# Patient Record
Sex: FEMALE | Race: WHITE | NOT HISPANIC OR LATINO | Employment: OTHER | URBAN - METROPOLITAN AREA
[De-identification: names, ages, dates, MRNs, and addresses within clinical notes are randomized per-mention and may not be internally consistent; named-entity substitution may affect disease eponyms.]

---

## 2017-04-24 ENCOUNTER — APPOINTMENT (EMERGENCY)
Dept: RADIOLOGY | Facility: HOSPITAL | Age: 82
End: 2017-04-24
Payer: MEDICARE

## 2017-04-24 ENCOUNTER — HOSPITAL ENCOUNTER (EMERGENCY)
Facility: HOSPITAL | Age: 82
Discharge: HOME | End: 2017-04-24
Attending: EMERGENCY MEDICINE | Admitting: EMERGENCY MEDICINE
Payer: MEDICARE

## 2017-04-24 VITALS
OXYGEN SATURATION: 92 % | RESPIRATION RATE: 18 BRPM | DIASTOLIC BLOOD PRESSURE: 70 MMHG | TEMPERATURE: 98.5 F | WEIGHT: 160 LBS | BODY MASS INDEX: 28.34 KG/M2 | HEART RATE: 72 BPM | SYSTOLIC BLOOD PRESSURE: 163 MMHG

## 2017-04-24 DIAGNOSIS — S00.93XA HEAD CONTUSION: ICD-10-CM
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
## 2017-04-24 DIAGNOSIS — S41.111A SKIN TEAR OF RIGHT UPPER ARM WITHOUT COMPLICATION, INITIAL ENCOUNTER: ICD-10-CM

## 2017-04-24 DIAGNOSIS — M25.551 ACUTE RIGHT HIP PAIN: ICD-10-CM

## 2017-04-24 PROCEDURE — 73564 X-RAY EXAM KNEE 4 OR MORE: CPT

## 2017-04-24 PROCEDURE — 73502 X-RAY EXAM HIP UNI 2-3 VIEWS: CPT

## 2017-04-24 PROCEDURE — 70450 CT HEAD/BRAIN W/O DYE: CPT

## 2017-04-24 PROCEDURE — 99284 EMERGENCY DEPT VISIT MOD MDM: CPT

## 2017-04-24 RX ORDER — ACETAMINOPHEN 325 MG/1
650 TABLET ORAL EVERY 6 HOURS PRN
COMMUNITY
End: 2018-06-11

## 2017-04-24 RX ORDER — MELATONIN
1000 2 TIMES DAILY
COMMUNITY

## 2017-04-26 ENCOUNTER — ALLSCRIPTS OFFICE VISIT (OUTPATIENT)
Dept: OTHER | Facility: OTHER | Age: 82
End: 2017-04-26

## 2017-05-10 ENCOUNTER — ALLSCRIPTS OFFICE VISIT (OUTPATIENT)
Dept: OTHER | Facility: OTHER | Age: 82
End: 2017-05-10

## 2017-05-21 ENCOUNTER — HOSPITAL ENCOUNTER (EMERGENCY)
Facility: HOSPITAL | Age: 82
Discharge: HOME | End: 2017-05-21
Attending: EMERGENCY MEDICINE
Payer: MEDICARE

## 2017-05-21 ENCOUNTER — APPOINTMENT (EMERGENCY)
Dept: RADIOLOGY | Facility: HOSPITAL | Age: 82
End: 2017-05-21
Payer: MEDICARE

## 2017-05-21 VITALS
HEIGHT: 64 IN | SYSTOLIC BLOOD PRESSURE: 180 MMHG | OXYGEN SATURATION: 95 % | DIASTOLIC BLOOD PRESSURE: 94 MMHG | TEMPERATURE: 98.1 F | RESPIRATION RATE: 18 BRPM | HEART RATE: 77 BPM | BODY MASS INDEX: 26.57 KG/M2 | WEIGHT: 155.65 LBS

## 2017-05-21 DIAGNOSIS — S61.411A SKIN TEAR OF RIGHT HAND WITHOUT COMPLICATION, INITIAL ENCOUNTER: Primary | ICD-10-CM

## 2017-05-21 DIAGNOSIS — S60.411A ABRASION OF LEFT INDEX FINGER, INITIAL ENCOUNTER: ICD-10-CM
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
## 2017-05-21 DIAGNOSIS — S60.221A CONTUSION OF HAND, RIGHT: ICD-10-CM

## 2017-05-21 DIAGNOSIS — S60.414A ABRASION OF RIGHT RING FINGER, INITIAL ENCOUNTER: ICD-10-CM

## 2017-05-21 PROCEDURE — 73130 X-RAY EXAM OF HAND: CPT

## 2017-05-21 PROCEDURE — 99284 EMERGENCY DEPT VISIT MOD MDM: CPT

## 2017-05-21 PROCEDURE — 73140 X-RAY EXAM OF FINGER(S): CPT

## 2017-05-21 RX ORDER — CEPHALEXIN 500 MG/1
500 CAPSULE ORAL 2 TIMES DAILY
Qty: 10 CAPSULE | Refills: 0 | Status: SHIPPED | OUTPATIENT
Start: 2017-05-21 | End: 2017-05-26

## 2017-05-21 RX ORDER — GINSENG 100 MG
1 CAPSULE ORAL ONCE
Status: COMPLETED | OUTPATIENT
Start: 2017-05-21 | End: 2017-05-21

## 2017-05-21 RX ORDER — CEPHALEXIN 250 MG/1
500 CAPSULE ORAL ONCE
Status: COMPLETED | OUTPATIENT
Start: 2017-05-21 | End: 2017-05-21

## 2017-05-21 RX ORDER — ACETAMINOPHEN 325 MG/1
650 TABLET ORAL ONCE
Status: COMPLETED | OUTPATIENT
Start: 2017-05-21 | End: 2017-05-21

## 2017-05-21 RX ADMIN — CEPHALEXIN 500 MG: 250 CAPSULE ORAL at 04:43

## 2017-05-21 RX ADMIN — BACITRACIN ZINC 1 SMALL APPLICATION: 500 OINTMENT TOPICAL at 04:44

## 2017-05-21 RX ADMIN — ACETAMINOPHEN 650 MG: 325 TABLET, FILM COATED ORAL at 04:43

## 2017-05-22 ENCOUNTER — ALLSCRIPTS OFFICE VISIT (OUTPATIENT)
Dept: OTHER | Facility: OTHER | Age: 82
End: 2017-05-22

## 2017-05-23 ENCOUNTER — ALLSCRIPTS OFFICE VISIT (OUTPATIENT)
Dept: WOUND CARE | Facility: HOSPITAL | Age: 82
End: 2017-05-23
Attending: PREVENTIVE MEDICINE
Payer: MEDICARE

## 2017-05-23 PROCEDURE — 97598 DBRDMT OPN WND ADDL 20CM/<: CPT | Performed by: PREVENTIVE MEDICINE

## 2017-05-23 PROCEDURE — 99213 OFFICE O/P EST LOW 20 MIN: CPT | Performed by: PREVENTIVE MEDICINE

## 2017-05-23 PROCEDURE — 97597 DBRDMT OPN WND 1ST 20 CM/<: CPT | Performed by: PREVENTIVE MEDICINE

## 2017-05-30 ENCOUNTER — ALLSCRIPTS OFFICE VISIT (OUTPATIENT)
Dept: WOUND CARE | Facility: HOSPITAL | Age: 82
End: 2017-05-30
Attending: PREVENTIVE MEDICINE
Payer: MEDICARE

## 2017-05-30 PROCEDURE — 97598 DBRDMT OPN WND ADDL 20CM/<: CPT | Performed by: PREVENTIVE MEDICINE

## 2017-05-30 PROCEDURE — 97597 DBRDMT OPN WND 1ST 20 CM/<: CPT | Performed by: PREVENTIVE MEDICINE

## 2017-06-01 ENCOUNTER — GENERIC CONVERSION - ENCOUNTER (OUTPATIENT)
Dept: OTHER | Facility: OTHER | Age: 82
End: 2017-06-01

## 2017-06-13 ENCOUNTER — APPOINTMENT (OUTPATIENT)
Dept: WOUND CARE | Facility: HOSPITAL | Age: 82
End: 2017-06-13
Attending: PODIATRIST
Payer: MEDICARE

## 2017-06-13 PROCEDURE — 97597 DBRDMT OPN WND 1ST 20 CM/<: CPT | Performed by: PREVENTIVE MEDICINE

## 2017-06-27 ENCOUNTER — APPOINTMENT (OUTPATIENT)
Dept: WOUND CARE | Facility: HOSPITAL | Age: 82
End: 2017-06-27
Payer: MEDICARE

## 2017-06-27 PROCEDURE — 17250 CHEM CAUT OF GRANLTJ TISSUE: CPT | Performed by: PREVENTIVE MEDICINE

## 2017-07-13 ENCOUNTER — APPOINTMENT (OUTPATIENT)
Dept: WOUND CARE | Facility: HOSPITAL | Age: 82
End: 2017-07-13
Payer: MEDICARE

## 2017-07-13 PROCEDURE — 99213 OFFICE O/P EST LOW 20 MIN: CPT | Performed by: PREVENTIVE MEDICINE

## 2017-09-18 ENCOUNTER — GENERIC CONVERSION - ENCOUNTER (OUTPATIENT)
Dept: INTERNAL MEDICINE CLINIC | Facility: CLINIC | Age: 82
End: 2017-09-18

## 2017-09-18 ENCOUNTER — GENERIC CONVERSION - ENCOUNTER (OUTPATIENT)
Dept: OTHER | Facility: OTHER | Age: 82
End: 2017-09-18

## 2017-12-13 ENCOUNTER — HOSPITAL ENCOUNTER (EMERGENCY)
Facility: HOSPITAL | Age: 82
Discharge: HOME | End: 2017-12-13
Attending: EMERGENCY MEDICINE
Payer: MEDICARE

## 2017-12-13 ENCOUNTER — APPOINTMENT (EMERGENCY)
Dept: RADIOLOGY | Facility: HOSPITAL | Age: 82
End: 2017-12-13
Payer: MEDICARE

## 2017-12-13 VITALS
OXYGEN SATURATION: 94 % | DIASTOLIC BLOOD PRESSURE: 77 MMHG | RESPIRATION RATE: 18 BRPM | SYSTOLIC BLOOD PRESSURE: 148 MMHG | HEART RATE: 80 BPM | TEMPERATURE: 97.7 F

## 2017-12-13 DIAGNOSIS — S52.613A FRACTURE OF ULNAR STYLOID: ICD-10-CM

## 2017-12-13 DIAGNOSIS — S52.509A DISTAL RADIUS FRACTURE: Primary | ICD-10-CM

## 2017-12-13 PROCEDURE — 73110 X-RAY EXAM OF WRIST: CPT

## 2017-12-13 PROCEDURE — 99283 EMERGENCY DEPT VISIT LOW MDM: CPT

## 2017-12-13 NOTE — ED PROVIDER NOTES
History  Chief Complaint   Patient presents with    Fall     Pt reports falling at 200 this morning, reporting left wrist pain and swelling  (-) THINNERS, (-) head strike     81 y/o female presents today after falling around 0030 this am   Lives with daughter but didn't tell her she fell  Was complaining of left wrist pain this morning  No other injuries  No thinners  Didn't hit head  NO LOC  History provided by:  Patient and relative  Fall   Mechanism of injury: fall    Injury location:  Shoulder/arm  Shoulder/arm injury location:  L wrist  Incident location:  Home  Time since incident:  12 hours  Fall:     Impact surface:  Hard floor  Associated symptoms: no abdominal pain, no back pain, no blindness, no chest pain, no difficulty breathing, no headaches, no hearing loss, no loss of consciousness, no nausea, no neck pain, no seizures and no vomiting    Risk factors: beta blocker therapy    Risk factors: no AICD, no anticoagulation therapy, no asthma, no CABG and no diabetes        Prior to Admission Medications   Prescriptions Last Dose Informant Patient Reported? Taking?   acetaminophen (TYLENOL) 325 mg tablet   Yes No   Sig: Take 650 mg by mouth every 6 (six) hours as needed for mild pain   aspirin 81 MG tablet   Yes No   Sig: Take 81 mg by mouth 2 (two) times a week  cholecalciferol (VITAMIN D3) 1,000 units tablet   Yes No   Sig: Take 1,000 Units by mouth 2 (two) times a day     ergocalciferol (VITAMIN D2) 50,000 units   Yes No   Sig: Take 50,000 Units by mouth every 28 days  lisinopril (ZESTRIL) 20 mg tablet   Yes No   Sig: Take 20 mg by mouth daily  metoprolol tartrate (LOPRESSOR) 50 mg tablet   Yes No   Sig: Take 50 mg by mouth 2 (two) times a day        Facility-Administered Medications: None       Past Medical History:   Diagnosis Date    History of shingles 2013    Hypertension     Osteoarthritis        Past Surgical History:   Procedure Laterality Date    APPENDECTOMY      CHOLECYSTECTOMY      HYSTERECTOMY      TONSILECTOMY AND ADNOIDECTOMY      VARICOSE VEIN SURGERY         History reviewed  No pertinent family history  I have reviewed and agree with the history as documented  Social History   Substance Use Topics    Smoking status: Never Smoker    Smokeless tobacco: Not on file    Alcohol use No        Review of Systems   HENT: Negative for hearing loss  Eyes: Negative for blindness and visual disturbance  Cardiovascular: Negative for chest pain  Gastrointestinal: Negative for abdominal pain, nausea and vomiting  Musculoskeletal: Negative for back pain and neck pain  Neurological: Negative for seizures, loss of consciousness, syncope and headaches  Psychiatric/Behavioral: Negative for confusion  Physical Exam  ED Triage Vitals [12/13/17 1159]   Temperature Pulse Respirations Blood Pressure SpO2   97 7 °F (36 5 °C) 60 18 148/77 96 %      Temp Source Heart Rate Source Patient Position - Orthostatic VS BP Location FiO2 (%)   Oral Monitor Sitting Left arm --      Pain Score       3           Orthostatic Vital Signs  Vitals:    12/13/17 1159 12/13/17 1443   BP: 148/77    Pulse: 60 80   Patient Position - Orthostatic VS: Sitting Sitting       Physical Exam   Constitutional: She is oriented to person, place, and time  She appears well-developed and well-nourished  HENT:   Head: Normocephalic and atraumatic  Eyes: EOM are normal  Pupils are equal, round, and reactive to light  Neck: Normal range of motion  Cardiovascular: Normal rate and regular rhythm  Pulmonary/Chest: Effort normal and breath sounds normal    Musculoskeletal:        Left wrist: She exhibits tenderness, bony tenderness and swelling  She exhibits normal range of motion and no effusion  Neurological: She is oriented to person, place, and time  Skin: Skin is warm and dry         ED Medications  Medications - No data to display    Diagnostic Studies  Results Reviewed     None XR wrist 3+ views LEFT   Final Result by Randolph Sesay MD (12/13 1672)      Impacted distal radial fracture with extension to the DRUJ as well as a nondisplaced ulnar styloid process fracture  Workstation performed: [id number]                    Procedures  Static Splint Application  Date/Time: 12/13/2017 3:52 PM  Performed by: Robinson Cooper  Authorized by: Robinson Cooper     Patient location:  ED  Procedure performed by emergency physician: Yes    Other Assisting Provider: No    Consent:     Consent obtained:  Verbal    Consent given by:  Patient  Universal protocol:     Procedure explained and questions answered to patient or proxy's satisfaction: yes      Patient identity confirmed:  Verbally with patient  Indication:     Indications: fracture    Pre-procedure details:     Sensation:  Normal  Procedure details:     Laterality:  Left    Location:  Wrist    Wrist:  L wrist    Splint type:  Volar short arm    Supplies:  Ortho-Glass  Post-procedure details:     Pain:  Improved    Sensation:  Normal    Neurovascular Exam: skin pink      Patient tolerance of procedure: Tolerated well, no immediate complications           Phone Contacts  ED Phone Contact    ED Course  ED Course                                MDM  Number of Diagnoses or Management Options  Distal radius fracture: new and requires workup  Fracture of ulnar styloid: new and requires workup  Diagnosis management comments: 3:50 PM  Late entry - xray shows fractured distal radius and ulnar styloid fracture  Splinted by me  Will d/c to f/u with ortho as outpatient          Amount and/or Complexity of Data Reviewed  Tests in the radiology section of CPT®: ordered and reviewed  Independent visualization of images, tracings, or specimens: yes    Risk of Complications, Morbidity, and/or Mortality  Presenting problems: moderate  Diagnostic procedures: moderate  Management options: moderate    Patient Progress  Patient progress: stable    CritCare Time    Disposition  Final diagnoses:   Distal radius fracture   Fracture of ulnar styloid     Time reflects when diagnosis was documented in both MDM as applicable and the Disposition within this note     Time User Action Codes Description Comment    12/13/2017  2:21 PM Mundo Skinner Add [J81 [id number]] Distal radius fracture     12/13/2017  2:22 PM Hugo Kwok Add [[id number]] Fracture of ulnar styloid       ED Disposition     ED Disposition Condition Comment    Discharge  Nasim Lea discharge to home/self care  Condition at discharge: Stable        Follow-up Information     Follow up With Specialties Details Why [address] [facility] Schedule an appointment as soon as possible for a visit  [address] [facility] [address] [phone]        Discharge Medication List as of 12/13/2017  2:36 PM      CONTINUE these medications which have NOT CHANGED    Details   acetaminophen (TYLENOL) 325 mg tablet Take 650 mg by mouth every 6 (six) hours as needed for mild pain, Until Discontinued, Historical Med      aspirin 81 MG tablet Take 81 mg by mouth 2 (two) times a week , Until Discontinued, Historical Med      cholecalciferol (VITAMIN D3) 1,000 units tablet Take 1,000 Units by mouth 2 (two) times a day  , Until Discontinued, Historical Med      ergocalciferol (VITAMIN D2) 50,000 units Take 50,000 Units by mouth every 28 days  , Until Discontinued, Historical Med      lisinopril (ZESTRIL) 20 mg tablet Take 20 mg by mouth daily  , Until Discontinued, Historical Med      metoprolol tartrate (LOPRESSOR) 50 mg tablet Take 50 mg by mouth 2 (two) times a day , Until Discontinued, Historical Med           No discharge procedures on file      ED Provider  Electronically Signed by           Thais Brady DO  12/13/17 1130

## 2017-12-13 NOTE — ED NOTES
Pt was [facility] independently of nursing staff by Provider   Splint also applied by Provider     Ramin William RN  12/13/17 9223

## 2017-12-13 NOTE — DISCHARGE INSTRUCTIONS
Wrist Fracture in Adults   WHAT YOU NEED TO KNOW:   A wrist fracture is a break in one or more of the bones in your wrist    DISCHARGE INSTRUCTIONS:   Return to the emergency department if:   · Your pain gets worse or does not get better after you take pain medicine  · Your cast or splint breaks, gets wet, or is damaged  · Your hand or fingers feel numb or cold  · Your hand or fingers turn white or blue  · Your splint or cast feels too tight  · You have more pain or swelling after the cast or splint is put on  Contact your healthcare provider if:   · You have a fever  · There is a foul smell or blood coming from under the cast     · You have questions or concerns about your condition or care  Medicines:   · Prescription pain medicine  may be given  Ask your healthcare provider how to take this medicine safely  Some prescription pain medicines contain acetaminophen  Do not take other medicines that contain acetaminophen without talking to your healthcare provider  Too much acetaminophen may cause liver damage  Prescription pain medicine may cause constipation  Ask your healthcare provider how to prevent or treat constipation  · NSAIDs , such as ibuprofen, help decrease swelling, pain, and fever  NSAIDs can cause stomach bleeding or kidney problems in certain people  If you take blood thinner medicine, always ask your healthcare provider if NSAIDs are safe for you  Always read the medicine label and follow directions  · Acetaminophen  decreases pain and fever  It is available without a doctor's order  Ask how much to take and how often to take it  Follow directions  Read the labels of all other medicines you are using to see if they also contain acetaminophen, or ask your doctor or pharmacist  Acetaminophen can cause liver damage if not taken correctly  Do not use more than 4 grams (4,000 milligrams) total of acetaminophen in one day  · Take your medicine as directed    Contact your healthcare provider if you think your medicine is not helping or if you have side effects  Tell him or her if you are allergic to any medicine  Keep a list of the medicines, vitamins, and herbs you take  Include the amounts, and when and why you take them  Bring the list or the pill bottles to follow-up visits  Carry your medicine list with you in case of an emergency  Self-care:   · Rest  as much as possible  Do not play contact sports until the healthcare provider says it is okay  · Apply ice  on your wrist for 15 to 20 minutes every hour or as directed  Use an ice pack, or put crushed ice in a plastic bag  Cover it with a towel before you place it on your skin  Ice helps prevent tissue damage and decreases swelling and pain  · Elevate  your wrist above the level of your heart as often as possible  This will help decrease swelling and pain  Prop your wrist on pillows or blankets to keep it elevated comfortably  Cast or splint care:   · You may take a bath or shower as directed  Do not let your cast or splint get wet  Before bathing, cover the cast or splint with 2 plastic trash bags  Tape the bags to your skin above the cast or splint to seal out the water  Keep your arm out of the water in case the bag breaks  If a plaster cast gets wet and soft, call your healthcare provider  · Check the skin around the cast or splint every day  You may put lotion on any red or sore areas  · Do not push down or lean on the cast or brace because it may break  · Do not  scratch the skin under the cast by putting a sharp or pointed object inside the cast   Go to physical therapy as directed: You may need physical therapy after your wrist heals and the cast is removed  A physical therapist can teach you exercises to help improve movement and strength and to decrease pain  Follow up with your healthcare provider or bone specialist as directed: You may need to return to have your cast removed   You may also need an x-ray to check how well the bone has healed  Write down your questions so you remember to ask them during your visits  © 2017 [employer] Information is for End User's use only and may not be sold, redistributed or otherwise used for commercial purposes  All illustrations and images included in CareNotes® are the copyrighted property of [employer]  or Mateo Sesay  The above information is an  only  It is not intended as medical advice for individual conditions or treatments  Talk to your doctor, nurse or pharmacist before following any medical regimen to see if it is safe and effective for you

## 2017-12-15 ENCOUNTER — GENERIC CONVERSION - ENCOUNTER (OUTPATIENT)
Dept: INTERNAL MEDICINE CLINIC | Facility: CLINIC | Age: 82
End: 2017-12-15

## 2017-12-28 ENCOUNTER — HOSPITAL ENCOUNTER (INPATIENT)
Facility: HOSPITAL | Age: 82
LOS: 7 days | Discharge: NURSING, REHAB OR LONG-TERM CARE | DRG: 392 | End: 2018-01-05
Attending: EMERGENCY MEDICINE | Admitting: INTERNAL MEDICINE
Payer: MEDICARE

## 2017-12-28 ENCOUNTER — APPOINTMENT (EMERGENCY)
Dept: RADIOLOGY | Facility: HOSPITAL | Age: 82
DRG: 392 | End: 2017-12-28
Payer: MEDICARE
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
## 2017-12-28 DIAGNOSIS — K52.9 GASTROENTERITIS: Primary | ICD-10-CM

## 2017-12-28 DIAGNOSIS — R53.1 GENERALIZED WEAKNESS: ICD-10-CM

## 2017-12-28 PROBLEM — R19.7 DIARRHEA: Status: ACTIVE | Noted: 2017-12-28

## 2017-12-28 PROBLEM — R11.10 VOMITING: Status: ACTIVE | Noted: 2017-12-28

## 2017-12-28 PROBLEM — F03.90 DEMENTIA (HCC): Status: ACTIVE | Noted: 2017-12-28

## 2017-12-28 PROBLEM — I10 ESSENTIAL HYPERTENSION: Status: ACTIVE | Noted: 2017-12-28

## 2017-12-28 PROBLEM — E55.9 VITAMIN D DEFICIENCY: Status: ACTIVE | Noted: 2017-12-28

## 2017-12-28 LAB
ALBUMIN SERPL BCP-MCNC: 3.9 G/DL (ref 3.5–5)
ALP SERPL-CCNC: 76 U/L (ref 46–116)
ALT SERPL W P-5'-P-CCNC: 13 U/L (ref 12–78)
ANION GAP SERPL CALCULATED.3IONS-SCNC: 8 MMOL/L (ref 4–13)
AST SERPL W P-5'-P-CCNC: 18 U/L (ref 5–45)
BACTERIA UR QL AUTO: NORMAL /HPF
BASOPHILS # BLD MANUAL: 0 THOUSAND/UL (ref 0–0.1)
BASOPHILS NFR MAR MANUAL: 0 % (ref 0–1)
BILIRUB SERPL-MCNC: 0.94 MG/DL (ref 0.2–1)
BILIRUB UR QL STRIP: NEGATIVE
BUN SERPL-MCNC: 13 MG/DL (ref 5–25)
CALCIUM SERPL-MCNC: 8.9 MG/DL (ref 8.3–10.1)
CHLORIDE SERPL-SCNC: 107 MMOL/L (ref 100–108)
CLARITY UR: CLEAR
CO2 SERPL-SCNC: 27 MMOL/L (ref 21–32)
COLOR UR: YELLOW
COLOR, POC: NORMAL
CREAT SERPL-MCNC: 0.68 MG/DL (ref 0.6–1.3)
EOSINOPHIL # BLD MANUAL: 0 THOUSAND/UL (ref 0–0.4)
EOSINOPHIL NFR BLD MANUAL: 0 % (ref 0–6)
ERYTHROCYTE [DISTWIDTH] IN BLOOD BY AUTOMATED COUNT: 13.3 % (ref 11.6–15.1)
GFR SERPL CREATININE-BSD FRML MDRD: 74 ML/MIN/1.73SQ M
GLUCOSE SERPL-MCNC: 131 MG/DL (ref 65–140)
GLUCOSE UR STRIP-MCNC: NEGATIVE MG/DL
HCT VFR BLD AUTO: 41.2 % (ref 34.8–46.1)
HGB BLD-MCNC: 13.7 G/DL (ref 11.5–15.4)
HGB UR QL STRIP.AUTO: ABNORMAL
HYALINE CASTS #/AREA URNS LPF: NORMAL /LPF
KETONES UR STRIP-MCNC: ABNORMAL MG/DL
LEUKOCYTE ESTERASE UR QL STRIP: NEGATIVE
LIPASE SERPL-CCNC: 65 U/L (ref 73–393)
LYMPHOCYTES # BLD AUTO: 0.38 THOUSAND/UL (ref 0.6–4.47)
LYMPHOCYTES # BLD AUTO: 4 % (ref 14–44)
MACROCYTES BLD QL AUTO: PRESENT
MCH RBC QN AUTO: 33.3 PG (ref 26.8–34.3)
MCHC RBC AUTO-ENTMCNC: 33.3 G/DL (ref 31.4–37.4)
MCV RBC AUTO: 100 FL (ref 82–98)
MONOCYTES # BLD AUTO: 0.38 THOUSAND/UL (ref 0–1.22)
MONOCYTES NFR BLD: 4 % (ref 4–12)
NEUTROPHILS # BLD MANUAL: 8.74 THOUSAND/UL (ref 1.85–7.62)
NEUTS HYPERSEG BLD QL SMEAR: PRESENT
NEUTS SEG NFR BLD AUTO: 91 % (ref 43–75)
NITRITE UR QL STRIP: NEGATIVE
NON-SQ EPI CELLS URNS QL MICRO: NORMAL /HPF
NRBC BLD AUTO-RTO: 0 /100 WBCS
PH UR STRIP.AUTO: 5 [PH] (ref 4.5–8)
PLATELET # BLD AUTO: 240 THOUSANDS/UL (ref 149–390)
PLATELET BLD QL SMEAR: ADEQUATE
PMV BLD AUTO: 9.7 FL (ref 8.9–12.7)
POIKILOCYTOSIS BLD QL SMEAR: PRESENT
POTASSIUM SERPL-SCNC: 3.4 MMOL/L (ref 3.5–5.3)
PROT SERPL-MCNC: 7.5 G/DL (ref 6.4–8.2)
PROT UR STRIP-MCNC: ABNORMAL MG/DL
RBC # BLD AUTO: 4.12 MILLION/UL (ref 3.81–5.12)
RBC #/AREA URNS AUTO: NORMAL /HPF
RBC MORPH BLD: PRESENT
SODIUM SERPL-SCNC: 142 MMOL/L (ref 136–145)
SP GR UR STRIP.AUTO: 1.01 (ref 1–1.03)
UROBILINOGEN UR QL STRIP.AUTO: 0.2 E.U./DL
VARIANT LYMPHS # BLD AUTO: 1 %
WBC # BLD AUTO: 9.6 THOUSAND/UL (ref 4.31–10.16)
WBC #/AREA URNS AUTO: NORMAL /HPF

## 2017-12-28 PROCEDURE — 81002 URINALYSIS NONAUTO W/O SCOPE: CPT | Performed by: EMERGENCY MEDICINE

## 2017-12-28 PROCEDURE — 83690 ASSAY OF LIPASE: CPT | Performed by: EMERGENCY MEDICINE

## 2017-12-28 PROCEDURE — 96361 HYDRATE IV INFUSION ADD-ON: CPT

## 2017-12-28 PROCEDURE — 74177 CT ABD & PELVIS W/CONTRAST: CPT

## 2017-12-28 PROCEDURE — 36415 COLL VENOUS BLD VENIPUNCTURE: CPT | Performed by: EMERGENCY MEDICINE

## 2017-12-28 PROCEDURE — 85007 BL SMEAR W/DIFF WBC COUNT: CPT | Performed by: EMERGENCY MEDICINE

## 2017-12-28 PROCEDURE — 81001 URINALYSIS AUTO W/SCOPE: CPT

## 2017-12-28 PROCEDURE — 99285 EMERGENCY DEPT VISIT HI MDM: CPT

## 2017-12-28 PROCEDURE — 80053 COMPREHEN METABOLIC PANEL: CPT | Performed by: EMERGENCY MEDICINE

## 2017-12-28 PROCEDURE — 96374 THER/PROPH/DIAG INJ IV PUSH: CPT

## 2017-12-28 PROCEDURE — 85027 COMPLETE CBC AUTOMATED: CPT | Performed by: EMERGENCY MEDICINE

## 2017-12-28 RX ORDER — POTASSIUM CHLORIDE 20 MEQ/1
40 TABLET, EXTENDED RELEASE ORAL ONCE
Status: COMPLETED | OUTPATIENT
Start: 2017-12-28 | End: 2017-12-28

## 2017-12-28 RX ORDER — ASPIRIN 81 MG/1
81 TABLET, CHEWABLE ORAL 2 TIMES WEEKLY
Status: DISCONTINUED | OUTPATIENT
Start: 2018-01-01 | End: 2018-01-05 | Stop reason: HOSPADM

## 2017-12-28 RX ORDER — ACETAMINOPHEN 325 MG/1
650 TABLET ORAL EVERY 6 HOURS PRN
Status: DISCONTINUED | OUTPATIENT
Start: 2017-12-28 | End: 2018-01-05 | Stop reason: HOSPADM

## 2017-12-28 RX ORDER — METOPROLOL TARTRATE 50 MG/1
50 TABLET, FILM COATED ORAL 2 TIMES DAILY
Status: DISCONTINUED | OUTPATIENT
Start: 2017-12-28 | End: 2018-01-05 | Stop reason: HOSPADM

## 2017-12-28 RX ORDER — ONDANSETRON 2 MG/ML
4 INJECTION INTRAMUSCULAR; INTRAVENOUS ONCE
Status: COMPLETED | OUTPATIENT
Start: 2017-12-28 | End: 2017-12-28

## 2017-12-28 RX ORDER — LISINOPRIL 20 MG/1
20 TABLET ORAL DAILY
Status: DISCONTINUED | OUTPATIENT
Start: 2017-12-29 | End: 2017-12-29

## 2017-12-28 RX ORDER — SODIUM CHLORIDE 9 MG/ML
75 INJECTION, SOLUTION INTRAVENOUS ONCE
Status: COMPLETED | OUTPATIENT
Start: 2017-12-28 | End: 2017-12-28

## 2017-12-28 RX ORDER — MELATONIN
1000 2 TIMES DAILY
Status: DISCONTINUED | OUTPATIENT
Start: 2017-12-28 | End: 2018-01-05 | Stop reason: HOSPADM

## 2017-12-28 RX ORDER — ONDANSETRON 2 MG/ML
4 INJECTION INTRAMUSCULAR; INTRAVENOUS EVERY 6 HOURS PRN
Status: DISCONTINUED | OUTPATIENT
Start: 2017-12-28 | End: 2018-01-05 | Stop reason: HOSPADM

## 2017-12-28 RX ADMIN — VITAMIN D, TAB 1000IU (100/BT) 1000 UNITS: 25 TAB at 20:38

## 2017-12-28 RX ADMIN — POTASSIUM CHLORIDE 40 MEQ: 1500 TABLET, EXTENDED RELEASE ORAL at 14:38

## 2017-12-28 RX ADMIN — SODIUM CHLORIDE 1000 ML: 0.9 INJECTION, SOLUTION INTRAVENOUS at 13:04

## 2017-12-28 RX ADMIN — ONDANSETRON 4 MG: 2 INJECTION INTRAMUSCULAR; INTRAVENOUS at 13:03

## 2017-12-28 RX ADMIN — SODIUM CHLORIDE 75 ML/HR: 0.9 INJECTION, SOLUTION INTRAVENOUS at 20:15

## 2017-12-28 RX ADMIN — METOPROLOL TARTRATE 50 MG: 50 TABLET ORAL at 20:39

## 2017-12-28 RX ADMIN — IOHEXOL 85 ML: 350 INJECTION, SOLUTION INTRAVENOUS at 14:13

## 2017-12-28 NOTE — ED ATTENDING ATTESTATION
Ranjeet Adamson MD, saw and evaluated the patient  I have discussed the patient with the resident/non-physician practitioner and agree with the resident's/non-physician practitioner's findings, Plan of Care, and MDM as documented in the resident's/non-physician practitioner's note, except where noted  All available labs and Radiology studies were reviewed  At this point I agree with the current assessment done in the Emergency Department  I have conducted an independent evaluation of this patient a history and physical is as follows:      Critical Care Time  CritCare Time   This is an evaluation of a 77-year-old female who presents to the emergency department complaining of 1 day nausea vomiting and diarrhea  Patient herself is a poor historian but lives at home with her daughter who is at bedside  Her daughter reports that the patient has had decreased oral intake as well as multiple episodes of nonbloody nonbilious vomiting and loose nonbloody stools  Cramping abdominal discomfort but denies abdominal pain otherwise  No known fevers  No new urinary symptoms reported  No chest pain or shortness of breath  On physical exam patient is an overly appearing female complaining of fatigue at this time  Vital signs are stable  HEENT is normocephalic and atraumatic with dry mucous membranes and mildly pale conjunctiva  Neck is supple with full range of motion  Heart is regular rate  Lungs are clear auscultation bilaterally  Abdomen is soft mildly tender to palpation epigastric region otherwise no rebound no guarding and no masses  No lower extremity edema or calf tenderness to palpations  Intact distal pulses and capillary refill less than 2 seconds  Oriented to self  Assessment and plan nausea vomiting diarrhea possible GI illness but with concern for dehydration will check blood work and give IV fluid hydration  As patient was tender on exam will also obtain CT abdomen pelvis    Will treat and disposition accordingly  Portions of the record may have been created with voice recognition software  Occasional wrong word or sound-a-like" substitutions may have occurred due to the inherent limitations of voice recognition software  Review chart carefully and recognize, using context, where substitutions have occurred    Procedures

## 2017-12-28 NOTE — H&P
History and Physical - [facility] Internal Medicine    Patient Information: Ping Blankenship 80 y o  female MRN: [id number]  Unit/Bed#: ED 25 Encounter: [id number]  Admitting Physician: DHAVAL Hillman  PCP: Nba Witt MD  Date of Admission:  12/28/17    Assessment/Plan:    Hospital Problem List:     Active Problems:    Vomiting    Diarrhea    Essential hypertension    Dementia, early onset    Vitamin D deficiency      Plan for the Primary Problem(s):  Vomiting/diarrhea,  Patient started acutely at 1:00 a m  with nonbloody nonbilious vomiting and nonbloody diarrhea  Per daughter no sick contacts but does report multiple family members at the house for the holiday  In the ED she received Zofran 4 mg IV and NSS 1000cc bolus  CT abd/pelvis, Fluid-filled small and large bowel loops which may be indicative of diarrheal illness  No evidence of bowel obstruction or focal inflammatory changes  Lipase, 65  Although daughter reports patient has not ate anything that they haven not ate, if continues with diarrhea or develops a fever please consider stool cultures, O&P  Ordered Zofran, prn  Ordered clear liquid diet    Plan for Additional Problems:   Hypertension,  She did not take any of her home medications today  Continue home medications of lisinopril and metoprolol    Early onset dementia,  Spoke with daughter who states patient is seeing her PCP and has decided not to initiate any medications at this point  Ordered fall risk precaution    Vitamin-D deficiency,  Continue vitamin-D3    Left radial/ulnar fracture s/p fall,  Supportive care with cast     Hypokalemia,  Potassium 3 4  Given KCl 40 mEq p  o  x1 dose in the ED  VTE Prophylaxis: Enoxaparin (Lovenox)  / sequential compression device   Code Status: DNI/DNR  POLST: POLST form is not discussed and not completed at this time      Anticipated Length of Stay:  Patient will be admitted on an Observation basis with an anticipated length of stay of  <2 midnights  Justification for Hospital Stay:  Vomiting/diarrhea    Total Time for Visit, including Counseling / Coordination of Care: 30 minutes  Greater than 50% of this total time spent on direct patient counseling and coordination of care  Chief Complaint:   Vomiting/diarrhea since 1:00 a m  History of Present Illness:    Dao Lujan is a 80 y o  female with a PMH early onset dementia, vitamin-D deficiency, osteoporosis, hypertension, and left radial/ulna fracture status post fall who presents with vomiting/diarrhea since 1 AM   Patient is a poor historian and information is gathered from her daughter whom she lives with  Per daughter patient symptoms started acutely at 1:00 a m  in which she had numerous episodes of nonbloody nonbilious emesis and numerous episodes of nonbloody loose stool  Per daughter no exposure to sick contacts however does report that multiple family members were at the house for the holidays  She also states patient has not ate any foods in which they have not ate  Denies fevers, chills, or cough  Review of Systems:    Review of Systems   Unable to perform ROS: Dementia       Past Medical and Surgical History:     Past Medical History:   Diagnosis Date    History of shingles 2013    Hypertension     Osteoarthritis        Past Surgical History:   Procedure Laterality Date    APPENDECTOMY      CHOLECYSTECTOMY      HYSTERECTOMY      TONSILECTOMY AND ADNOIDECTOMY      VARICOSE VEIN SURGERY         Meds/Allergies:    Prior to Admission medications    Medication Sig Start Date End Date Taking? Authorizing Provider   acetaminophen (TYLENOL) 325 mg tablet Take 650 mg by mouth every 6 (six) hours as needed for mild pain   Yes Historical Provider, MD   aspirin 81 MG tablet Take 81 mg by mouth 2 (two) times a week     Yes Historical Provider, MD   cholecalciferol (VITAMIN D3) 1,000 units tablet Take 1,000 Units by mouth 2 (two) times a day     Yes Historical Provider, MD ergocalciferol (VITAMIN D2) 50,000 units Take 50,000 Units by mouth every 28 days  Yes Historical Provider, MD   lisinopril (ZESTRIL) 20 mg tablet Take 20 mg by mouth daily  Yes Historical Provider, MD   metoprolol tartrate (LOPRESSOR) 50 mg tablet Take 50 mg by mouth 2 (two) times a day  Yes Historical Provider, MD     I have reviewed home medications with patient family member  Allergies: No Known Allergies    Social History:     Marital Status:    Occupation: retired  Patient Pre-hospital Living Situation: lives with daughter  Patient Pre-hospital Level of Mobility: walks with walker  Patient Pre-hospital Diet Restrictions: none  Substance Use History:   History   Alcohol Use No     History   Smoking Status    Never Smoker   Smokeless Tobacco    Never Used     History   Drug Use No       Family History:    History reviewed  No pertinent family history  Physical Exam:     Vitals:   Blood Pressure: (!) 211/89 (12/28/17 1654)  Pulse: 80 (12/28/17 1654)  Temperature: 98 6 °F (37 °C) (12/28/17 1234)  Temp Source: Oral (12/28/17 1234)  Respirations: 18 (12/28/17 1654)  Weight - Scale: 68 kg (150 lb) (12/28/17 1234)  SpO2: 97 % (12/28/17 1654)    Physical Exam   Constitutional: She appears well-developed and well-nourished  No distress  HENT:   Head: Normocephalic and atraumatic  Neck: Normal range of motion  Neck supple  Cardiovascular: Normal rate, regular rhythm and normal heart sounds  Exam reveals no gallop and no friction rub  No murmur heard  Pulmonary/Chest: Effort normal and breath sounds normal  No respiratory distress  She has no wheezes  She has no rales  She exhibits no tenderness  Abdominal: Soft  Bowel sounds are normal  She exhibits no distension and no mass  There is no tenderness  There is no rebound and no guarding  Ventral hernia noted   Musculoskeletal: Normal range of motion  She exhibits edema  She exhibits no tenderness or deformity     Neurological: She is alert    Oriented to person and place only   Skin: Skin is warm and dry  No rash noted  She is not diaphoretic  No erythema  No pallor  Psychiatric:   Anxious        Additional Data:     Lab Results: I have personally reviewed pertinent reports  Results from last 7 days  Lab Units 12/28/17  1300   WBC Thousand/uL 9 60   HEMOGLOBIN g/dL 13 7   HEMATOCRIT % 41 2   PLATELETS Thousands/uL 240   LYMPHO PCT % 4*   MONO PCT MAN % 4   EOSINO PCT MANUAL % 0       Results from last 7 days  Lab Units 12/28/17  1300   SODIUM mmol/L 142   POTASSIUM mmol/L 3 4*   CHLORIDE mmol/L 107   CO2 mmol/L 27   BUN mg/dL 13   CREATININE mg/dL 0 68   CALCIUM mg/dL 8 9   TOTAL PROTEIN g/dL 7 5   BILIRUBIN TOTAL mg/dL 0 94   ALK PHOS U/L 76   ALT U/L 13   AST U/L 18   GLUCOSE RANDOM mg/dL 131           Imaging: I have personally reviewed pertinent reports  Xr Wrist 3+ Views Left    Result Date: 12/13/2017  Narrative: LEFT WRIST INDICATION:  Fall COMPARISON: None VIEWS:  PA, lateral, and oblique IMAGES:  4 FINDINGS: Impacted distal radial fracture  Difficult to discern whether there is extension to the radiocarpal joint  There is extension to the DRUJ  Minimally displaced ulnar styloid process fracture  No substantial angulation  No dislocation  Bony degenerative changes at the radiocarpal joint  No evidence for scaphoid fracture  Soft tissue swelling     Impression: Impacted distal radial fracture with extension to the DRUJ as well as a nondisplaced ulnar styloid process fracture  Workstation performed: [id number]     Ct Abdomen Pelvis With Contrast    Result Date: 12/28/2017  Narrative: CT ABDOMEN AND PELVIS WITH IV CONTRAST INDICATION:  Vomiting/diarrhea  COMPARISON: CT abdomen and pelvis 7/7/2012 TECHNIQUE:  CT examination of the abdomen and pelvis was performed  Reformatted images were created in axial, sagittal, and coronal planes  Radiation dose length product (DLP) for this visit:  461 5 mGy-cm     This examination, like all CT scans performed in the [facility], was performed utilizing techniques to minimize radiation dose exposure, including the use of iterative reconstruction and automated exposure control  IV Contrast:  85 mL of iohexol (OMNIPAQUE)         Enteric Contrast:  Enteric contrast was not administered  FINDINGS: ABDOMEN LOWER CHEST:  No significant abnormalities identified in the lower chest  LIVER/BILIARY TREE:  Mild central biliary duct ectasia, similar and may reflect reservoir effect from prior cholecystectomy  No suspicious hepatic lesions  GALLBLADDER:  Gallbladder is surgically absent  SPLEEN:  Unremarkable  PANCREAS:  Moderately severe fatty involution without acute findings  ADRENAL GLANDS:  Unremarkable  KIDNEYS/URETERS:  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  STOMACH AND BOWEL:  Fluid-filled small and large bowel loops are seen without evidence for obstruction  Findings may reflect diarrheal illness  No focal inflammatory changes or fluid collections are identified  APPENDIX:  No findings to suggest appendicitis  ABDOMINOPELVIC CAVITY:  No free intraperitoneal air  Calcification in the right pelvic mesentery on image 37 is unchanged, presumably calcified lymph node  Miniscule free fluid in the pelvis  VESSELS:  Atherosclerotic changes are present  No evidence of aneurysm  PELVIS REPRODUCTIVE ORGANS:  Patient is status post hysterectomy  URINARY BLADDER:  Unremarkable  ABDOMINAL WALL/INGUINAL REGIONS:  Small fat-containing umbilical hernia  There is atrophy of the rectus abdominis musculature with laxity and protuberance of the ventral abdominal wall  OSSEOUS STRUCTURES:  No acute fracture or destructive osseous lesion  Chronic appearing superior endplate compression deformity of L3  Distal left radial fracture is incidentally noted corresponding to findings on recent plain film earlier this month       Impression: Fluid-filled small and large bowel loops which may be indicative of diarrheal illness  No evidence of bowel obstruction or focal inflammatory changes  Horseshoe kidney  Workstation performed: [id number]       EKG, Pathology, and Other Studies Reviewed on Admission:   · EKG:NA    [employer] Records Reviewed: Yes     ** Please Note: Dragon 360 Dictation voice to text software may have been used in the creation of this document   **

## 2017-12-28 NOTE — ED PROVIDER NOTES
History  Chief Complaint   Patient presents with    Vomiting     pt with 2 days vomiting and diarrhea  pt denies black stools   Diarrhea     80year-old female presenting from home for evaluation of vomiting and diarrhea  Symptoms started acutely at 1:00 a m  Patient reports numerous episodes of nonbloody nonbilious emesis and numerous episodes of nonbloody loose stool  No known sick contacts, however daughter reports that multiple family members were at the house for the holidays  Daughter also reports that patient has had urinary frequency with urinating 5 times from 11:00 p m  to 1:00 a m  Pelon Jin Denies any fevers, chills, headache, URI symptoms, cough, CP, SOB, abdominal pain  History of HTN  Abdominal surgeries:  Cholecystectomy, appendectomy  Lives at home with daughter who is at bedside  A/P:  49-year-old female with vomiting/diarrhea, will treat symptoms, CBC to assess for leukocytosis/anemia, CMP to assess liver/renal function/electrolytes, lipase to rule out pancreatitis, CTA/P to assess for intra-abdominal pathology, UA to rule out UTI            Prior to Admission Medications   Prescriptions Last Dose Informant Patient Reported? Taking?   acetaminophen (TYLENOL) 325 mg tablet   Yes Yes   Sig: Take 650 mg by mouth every 6 (six) hours as needed for mild pain   aspirin 81 MG tablet   Yes Yes   Sig: Take 81 mg by mouth 2 (two) times a week  cholecalciferol (VITAMIN D3) 1,000 units tablet   Yes Yes   Sig: Take 1,000 Units by mouth 2 (two) times a day     ergocalciferol (VITAMIN D2) 50,000 units   Yes Yes   Sig: Take 50,000 Units by mouth every 28 days  lisinopril (ZESTRIL) 20 mg tablet   Yes Yes   Sig: Take 20 mg by mouth daily  metoprolol tartrate (LOPRESSOR) 50 mg tablet   Yes Yes   Sig: Take 50 mg by mouth 2 (two) times a day        Facility-Administered Medications: None       Past Medical History:   Diagnosis Date    History of shingles 2013    Hypertension     Osteoarthritis        Past Surgical History:   Procedure Laterality Date    APPENDECTOMY      CHOLECYSTECTOMY      HYSTERECTOMY      TONSILECTOMY AND ADNOIDECTOMY      VARICOSE VEIN SURGERY         History reviewed  No pertinent family history  I have reviewed and agree with the history as documented  Social History   Substance Use Topics    Smoking status: Never Smoker    Smokeless tobacco: Never Used    Alcohol use No        Review of Systems   Constitutional: Negative for chills, fever and unexpected weight change  HENT: Negative for ear pain, rhinorrhea and sore throat  Eyes: Negative for pain and visual disturbance  Respiratory: Negative for cough and shortness of breath  Cardiovascular: Negative for chest pain and leg swelling  Gastrointestinal: Positive for diarrhea and vomiting  Negative for abdominal pain, constipation and nausea  Endocrine: Negative for polydipsia, polyphagia and polyuria  Genitourinary: Negative for dysuria, frequency, hematuria and urgency  Musculoskeletal: Negative for back pain, myalgias and neck pain  Skin: Negative for color change and rash  Allergic/Immunologic: Negative for environmental allergies and immunocompromised state  Neurological: Negative for dizziness, weakness, light-headedness, numbness and headaches  Hematological: Negative for adenopathy  Does not bruise/bleed easily  Psychiatric/Behavioral: Negative for agitation and confusion  All other systems reviewed and are negative        Physical Exam  ED Triage Vitals [12/28/17 1234]   Temperature Pulse Respirations Blood Pressure SpO2   98 6 °F (37 °C) 90 18 147/69 96 %      Temp Source Heart Rate Source Patient Position - Orthostatic VS BP Location FiO2 (%)   Oral Monitor Lying Right arm --      Pain Score       No Pain           Orthostatic Vital Signs  Vitals:    12/29/17 1005 12/29/17 1611 12/29/17 1700 12/29/17 1749   BP: 160/70 (!) 182/80 165/70    Pulse: 70 66  68   Patient Position - Orthostatic VS: Physical Exam   Constitutional: She appears well-developed and well-nourished  HENT:   Head: Normocephalic and atraumatic  Nose: Nose normal    Mouth/Throat: Oropharynx is clear and moist    Eyes: Conjunctivae and EOM are normal    Neck: Normal range of motion  Neck supple  Cardiovascular: Normal rate, regular rhythm and normal heart sounds  Pulmonary/Chest: Effort normal and breath sounds normal  No respiratory distress  She exhibits no tenderness  Abdominal: Soft  She exhibits no distension  There is no rebound and no guarding  No abdominal ttp  Negative Sanford's sign  No back/CVa ttp   Musculoskeletal: She exhibits no edema or deformity  Neurological: She is alert  No cranial nerve deficit  She exhibits normal muscle tone  Coordination normal    Skin: Skin is warm and dry  Psychiatric: She has a normal mood and affect  Thought content normal    Nursing note and vitals reviewed        ED Medications  Medications    EMS REPLENISHMENT MED (not administered)   acetaminophen (TYLENOL) tablet 650 mg (not administered)   aspirin chewable tablet 81 mg (not administered)   cholecalciferol (VITAMIN D3) tablet 1,000 Units (1,000 Units Oral Given 12/29/17 1749)   metoprolol tartrate (LOPRESSOR) tablet 50 mg (50 mg Oral Given 12/29/17 1749)   ondansetron (ZOFRAN) injection 4 mg (not administered)   enoxaparin (LOVENOX) subcutaneous injection 40 mg (40 mg Subcutaneous Given 12/29/17 0851)   sodium chloride 0 9 % infusion (50 mL/hr Intravenous New Bag 12/29/17 1552)   lisinopril (ZESTRIL) tablet 40 mg (not administered)   sodium chloride 0 9 % bolus 1,000 mL (1,000 mL Intravenous New Bag 12/28/17 1304)   ondansetron (ZOFRAN) injection 4 mg (4 mg Intravenous Given 12/28/17 1303)   potassium chloride (K-DUR,KLOR-CON) CR tablet 40 mEq (40 mEq Oral Given 12/28/17 1438)   iohexol (OMNIPAQUE) 350 MG/ML injection (MULTI-DOSE) 100 mL (85 mL Intravenous Given 12/28/17 1413)   sodium chloride 0 9 % infusion (75 mL/hr Intravenous New Bag 12/28/17 2015)       Diagnostic Studies  Results Reviewed     Procedure Component Value Units Date/Time    Urine Microscopic [[id number]]  (Normal) Collected:  12/28/17 1843    Lab Status:  Final result Specimen:  Urine from Urine, Clean Catch Updated:  12/28/17 1856     RBC, UA None Seen /hpf      WBC, UA None Seen /hpf      Epithelial Cells None Seen /hpf      Bacteria, UA Occasional /hpf      Hyaline Casts, UA None Seen /lpf     POCT urinalysis dipstick [[id number]]  (Normal) Resulted:  12/28/17 1843    Lab Status:  Final result Updated:  12/28/17 1843     Color, UA *    ED Urine Macroscopic [[id number]]  (Abnormal) Collected:  12/28/17 1843    Lab Status:  Final result Specimen:  Urine Updated:  12/28/17 1841     Color, UA Yellow     Clarity, UA Clear     pH, UA 5 0     Leukocytes, UA Negative     Nitrite, UA Negative     Protein, UA Trace (A) mg/dl      Glucose, UA Negative mg/dl      Ketones, UA Trace (A) mg/dl      Urobilinogen, UA 0 2 E U /dl      Bilirubin, UA Negative     Blood, UA Moderate (A)     Specific [place], UA 1 010    Narrative:       CLINITEK RESULT    CBC and differential [[id number]]  (Abnormal) Collected:  12/28/17 1300    Lab Status:  Final result Specimen:  Blood from Arm, Right Updated:  12/28/17 1419     WBC 9 60 Thousand/uL      RBC 4 12 Million/uL      Hemoglobin 13 7 g/dL      Hematocrit 41 2 %       (H) fL      MCH 33 3 pg      MCHC 33 3 g/dL      RDW 13 3 %      MPV 9 7 fL      Platelets 758 Thousands/uL      nRBC 0 /100 WBCs     Narrative: This is an appended report  These results have been appended to a previously verified report      Comprehensive metabolic panel [[id number]]  (Abnormal) Collected:  12/28/17 1300    Lab Status:  Final result Specimen:  Blood from Arm, Right Updated:  12/28/17 1341     Sodium 142 mmol/L      Potassium 3 4 (L) mmol/L      Chloride 107 mmol/L      CO2 27 mmol/L      Anion Gap 8 mmol/L      BUN 13 mg/dL      Creatinine 0 68 mg/dL      Glucose 131 mg/dL      Calcium 8 9 mg/dL      AST 18 U/L      ALT 13 U/L      Alkaline Phosphatase 76 U/L      Total Protein 7 5 g/dL      Albumin 3 9 g/dL      Total Bilirubin 0 94 mg/dL      eGFR 74 ml/min/1 73sq m     Narrative:         National Kidney Disease Education Program recommendations are as follows:  GFR calculation is accurate only with a steady state creatinine  Chronic Kidney disease less than 60 ml/min/1 73 sq  meters  Kidney failure less than 15 ml/min/1 73 sq  meters  Lipase [[id number]]  (Abnormal) Collected:  12/28/17 1300    Lab Status:  Final result Specimen:  Blood from Arm, Right Updated:  12/28/17 1341     Lipase 65 (L) u/L                  CT abdomen pelvis with contrast   ED Interpretation by Guerline Fernandez DO (12/28 3738)    Order Details    Study Result       CT ABDOMEN AND PELVIS WITH IV CONTRAST       INDICATION:  Vomiting/diarrhea        COMPARISON: CT abdomen and pelvis 7/7/2012       TECHNIQUE:  CT examination of the abdomen and pelvis was performed  Reformatted images were created in axial, sagittal, and coronal planes          Radiation dose length product (DLP) for this visit:  461 5 mGy-cm   This examination, like all CT scans performed in the [facility], was performed utilizing techniques to minimize radiation dose exposure, including the use of iterative    reconstruction and automated exposure control        IV Contrast:  85 mL of iohexol (OMNIPAQUE)            Enteric Contrast:  Enteric contrast was not administered        FINDINGS:       ABDOMEN       LOWER CHEST:  No significant abnormalities identified in the lower chest        LIVER/BILIARY TREE:  Mild central biliary duct ectasia, similar and may reflect reservoir effect from prior cholecystectomy    No suspicious hepatic lesions        GALLBLADDER:  Gallbladder is surgically absent        SPLEEN:  Unremarkable        PANCREAS:  Moderately severe fatty involution without acute findings        ADRENAL GLANDS:  Unremarkable        KIDNEYS/URETERS:  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        STOMACH AND BOWEL:  Fluid-filled small and large bowel loops are seen without evidence for obstruction  Findings may reflect diarrheal illness  No focal inflammatory changes or fluid collections are identified        APPENDIX:  No findings to suggest appendicitis        ABDOMINOPELVIC CAVITY:  No free intraperitoneal air  Calcification in the right pelvic mesentery on image 37 is unchanged, presumably calcified lymph node  Miniscule free fluid in the pelvis        VESSELS:  Atherosclerotic changes are present  No evidence of aneurysm        PELVIS       REPRODUCTIVE ORGANS:  Patient is status post hysterectomy        URINARY BLADDER:  Unremarkable        ABDOMINAL WALL/INGUINAL REGIONS:  Small fat-containing umbilical hernia  There is atrophy of the rectus abdominis musculature with laxity and protuberance of the ventral abdominal wall        OSSEOUS STRUCTURES:  No acute fracture or destructive osseous lesion  Chronic appearing superior endplate compression deformity of L3  Distal left radial fracture is incidentally noted corresponding to findings on recent plain film earlier this month        IMPRESSION:       Fluid-filled small and large bowel loops which may be indicative of diarrheal illness  No evidence of bowel obstruction or focal inflammatory changes        Horseshoe kidney  Final Result by Mary Carpenter DO (12/28 1446)      Fluid-filled small and large bowel loops which may be indicative of diarrheal illness  No evidence of bowel obstruction or focal inflammatory changes  Horseshoe kidney  Workstation performed: [id number]               Procedures  Procedures      Phone Consults  ED Phone Contact    ED Course  ED Course as of Dec 29 1814   Thu Dec 28, 2017   1344 Will replete Potassium: (!) 3 4   1502 Updated regarding labs/CT results   No episodes of vomiting/diarrhea in ED  No current complaints  Pt attempted to give urine sample, but missed hat, will try again for sample in a little bit    1534 Pt tolerating po (water)    1631 Pt taking water again  Immediately falls back asleep  Required assistance to walk to bathroom early  Daughter expresses concern about taking care of her at home and would like pt admitted  SLIM paged            Identification of Seniors at [address] [place] [address] Most Recent Value   (ISAR) Identification of Seniors at Risk   Before the illness or injury that brought you to the Emergency, did you need someone to help you on a regular basis? 0 Filed at: 12/28/2017 1237   In the last 24 hours, have you needed more help than usual?  0 Filed at: 12/28/2017 1237   Have you been hospitalized for one or more nights during the past 6 months? 1 Filed at: 12/28/2017 1237   In general, do you see well?  0 Filed at: 12/28/2017 1237   In general, do you have serious problems with your memory? 1 Filed at: 12/28/2017 1237   Do you take more than three different medications every day? 1 Filed at: 12/28/2017 1237   ISAR Score  3 Filed at: 12/28/2017 1237                          MDM  Number of Diagnoses or Management Options  Gastroenteritis:   Generalized weakness:   Diagnosis management comments: 81 yo F with gastroenteritis and generalized weakness   Live at home with daughter who does not feel comfortable taking her home with her generalized weakness, admitted for obs       Amount and/or Complexity of Data Reviewed  Clinical lab tests: ordered and reviewed  Tests in the radiology section of CPT®: ordered and reviewed  Tests in the medicine section of CPT®: ordered and reviewed      CritCare Time    Disposition  Final diagnoses:   Gastroenteritis   Generalized weakness     Time reflects when diagnosis was documented in both MDM as applicable and the Disposition within this note     Time User Action Codes Description Comment    12/28/2017 4:47 PM Dominique Cook Add [K52 9] Gastroenteritis     12/28/2017  4:47 PM Pattie Hernandez Add [R53 1] Generalized weakness       ED Disposition     ED Disposition Condition Comment    Admit  Case was discussed with KOKI and the patient's admission status was agreed to be Admission Status: observation status to the service of Dr Calos Bunch          Follow-up Information    None       Current Discharge Medication List      CONTINUE these medications which have NOT CHANGED    Details   acetaminophen (TYLENOL) 325 mg tablet Take 650 mg by mouth every 6 (six) hours as needed for mild pain      aspirin 81 MG tablet Take 81 mg by mouth 2 (two) times a week  cholecalciferol (VITAMIN D3) 1,000 units tablet Take 1,000 Units by mouth 2 (two) times a day        ergocalciferol (VITAMIN D2) 50,000 units Take 50,000 Units by mouth every 28 days  lisinopril (ZESTRIL) 20 mg tablet Take 20 mg by mouth daily  metoprolol tartrate (LOPRESSOR) 50 mg tablet Take 50 mg by mouth 2 (two) times a day  No discharge procedures on file  ED Provider  Attending physically available and evaluated Xavimary Edson AGUILERA managed the patient along with the ED Attending      Electronically Signed by         Juan Manuel Petersen DO  Resident  12/29/17 4676

## 2017-12-29 LAB
ANION GAP SERPL CALCULATED.3IONS-SCNC: 7 MMOL/L (ref 4–13)
BACTERIA UR QL AUTO: ABNORMAL /HPF
BILIRUB UR QL STRIP: NEGATIVE
BUN SERPL-MCNC: 10 MG/DL (ref 5–25)
CALCIUM SERPL-MCNC: 8 MG/DL (ref 8.3–10.1)
CHLORIDE SERPL-SCNC: 112 MMOL/L (ref 100–108)
CLARITY UR: ABNORMAL
CO2 SERPL-SCNC: 24 MMOL/L (ref 21–32)
COLOR UR: YELLOW
CREAT SERPL-MCNC: 0.51 MG/DL (ref 0.6–1.3)
ERYTHROCYTE [DISTWIDTH] IN BLOOD BY AUTOMATED COUNT: 13.4 % (ref 11.6–15.1)
GFR SERPL CREATININE-BSD FRML MDRD: 81 ML/MIN/1.73SQ M
GLUCOSE SERPL-MCNC: 92 MG/DL (ref 65–140)
GLUCOSE UR STRIP-MCNC: NEGATIVE MG/DL
HCT VFR BLD AUTO: 33.2 % (ref 34.8–46.1)
HCT VFR BLD AUTO: 36.2 % (ref 34.8–46.1)
HGB BLD-MCNC: 10.9 G/DL (ref 11.5–15.4)
HGB BLD-MCNC: 11.8 G/DL (ref 11.5–15.4)
HGB UR QL STRIP.AUTO: ABNORMAL
HYALINE CASTS #/AREA URNS LPF: ABNORMAL /LPF
KETONES UR STRIP-MCNC: NEGATIVE MG/DL
LEUKOCYTE ESTERASE UR QL STRIP: NEGATIVE
MAGNESIUM SERPL-MCNC: 1.9 MG/DL (ref 1.6–2.6)
MCH RBC QN AUTO: 32.8 PG (ref 26.8–34.3)
MCHC RBC AUTO-ENTMCNC: 32.8 G/DL (ref 31.4–37.4)
MCV RBC AUTO: 100 FL (ref 82–98)
NITRITE UR QL STRIP: NEGATIVE
NON-SQ EPI CELLS URNS QL MICRO: ABNORMAL /HPF
PH UR STRIP.AUTO: 5 [PH] (ref 4.5–8)
PLATELET # BLD AUTO: 203 THOUSANDS/UL (ref 149–390)
PLATELET # BLD AUTO: 204 THOUSANDS/UL (ref 149–390)
PMV BLD AUTO: 9.6 FL (ref 8.9–12.7)
PMV BLD AUTO: 9.7 FL (ref 8.9–12.7)
POTASSIUM SERPL-SCNC: 3.5 MMOL/L (ref 3.5–5.3)
PROT UR STRIP-MCNC: ABNORMAL MG/DL
RBC # BLD AUTO: 3.32 MILLION/UL (ref 3.81–5.12)
RBC #/AREA URNS AUTO: ABNORMAL /HPF
SODIUM SERPL-SCNC: 143 MMOL/L (ref 136–145)
SP GR UR STRIP.AUTO: 1.03 (ref 1–1.03)
UROBILINOGEN UR QL STRIP.AUTO: 0.2 E.U./DL
WBC # BLD AUTO: 4.53 THOUSAND/UL (ref 4.31–10.16)
WBC #/AREA URNS AUTO: ABNORMAL /HPF

## 2017-12-29 PROCEDURE — 85027 COMPLETE CBC AUTOMATED: CPT | Performed by: NURSE PRACTITIONER

## 2017-12-29 PROCEDURE — 83735 ASSAY OF MAGNESIUM: CPT | Performed by: NURSE PRACTITIONER

## 2017-12-29 PROCEDURE — G8988 SELF CARE GOAL STATUS: HCPCS

## 2017-12-29 PROCEDURE — 85018 HEMOGLOBIN: CPT | Performed by: PHYSICIAN ASSISTANT

## 2017-12-29 PROCEDURE — 80048 BASIC METABOLIC PNL TOTAL CA: CPT | Performed by: NURSE PRACTITIONER

## 2017-12-29 PROCEDURE — 85049 AUTOMATED PLATELET COUNT: CPT | Performed by: NURSE PRACTITIONER

## 2017-12-29 PROCEDURE — G8978 MOBILITY CURRENT STATUS: HCPCS

## 2017-12-29 PROCEDURE — 81001 URINALYSIS AUTO W/SCOPE: CPT | Performed by: NURSE PRACTITIONER

## 2017-12-29 PROCEDURE — G8979 MOBILITY GOAL STATUS: HCPCS

## 2017-12-29 PROCEDURE — 97166 OT EVAL MOD COMPLEX 45 MIN: CPT

## 2017-12-29 PROCEDURE — 97162 PT EVAL MOD COMPLEX 30 MIN: CPT

## 2017-12-29 PROCEDURE — G8987 SELF CARE CURRENT STATUS: HCPCS

## 2017-12-29 PROCEDURE — 85014 HEMATOCRIT: CPT | Performed by: PHYSICIAN ASSISTANT

## 2017-12-29 RX ORDER — LISINOPRIL 20 MG/1
40 TABLET ORAL DAILY
Status: DISCONTINUED | OUTPATIENT
Start: 2017-12-30 | End: 2018-01-05 | Stop reason: HOSPADM

## 2017-12-29 RX ORDER — SODIUM CHLORIDE 9 MG/ML
50 INJECTION, SOLUTION INTRAVENOUS CONTINUOUS
Status: DISCONTINUED | OUTPATIENT
Start: 2017-12-29 | End: 2017-12-31

## 2017-12-29 RX ADMIN — VITAMIN D, TAB 1000IU (100/BT) 1000 UNITS: 25 TAB at 17:49

## 2017-12-29 RX ADMIN — METOPROLOL TARTRATE 50 MG: 50 TABLET ORAL at 08:51

## 2017-12-29 RX ADMIN — SODIUM CHLORIDE 50 ML/HR: 0.9 INJECTION, SOLUTION INTRAVENOUS at 15:52

## 2017-12-29 RX ADMIN — METOPROLOL TARTRATE 50 MG: 50 TABLET ORAL at 17:49

## 2017-12-29 RX ADMIN — VITAMIN D, TAB 1000IU (100/BT) 1000 UNITS: 25 TAB at 08:52

## 2017-12-29 RX ADMIN — LISINOPRIL 20 MG: 20 TABLET ORAL at 08:51

## 2017-12-29 RX ADMIN — ENOXAPARIN SODIUM 40 MG: 40 INJECTION SUBCUTANEOUS at 08:51

## 2017-12-29 NOTE — OCCUPATIONAL THERAPY NOTE
[address]  Evaluation     Patient Name: Vikki ANDREWS Date: 12/29/2017  Problem List  Patient Active Problem List   Diagnosis    Vomiting    Diarrhea    Essential hypertension    Dementia, early onset    Vitamin D deficiency     Past Medical History  Past Medical History:   Diagnosis Date    History of shingles 2013    Hypertension     Osteoarthritis      Past Surgical History  Past Surgical History:   Procedure Laterality Date    APPENDECTOMY      CHOLECYSTECTOMY      HYSTERECTOMY      TONSILECTOMY AND ADNOIDECTOMY      VARICOSE VEIN SURGERY          12/29/17 1000   Restrictions/Precautions   Weight Bearing Precautions Per Order Yes   LUE Weight Bearing Per Order NWB  (Per Ortho resident )   Braces or Orthoses Splint  (L UE Hard cast )   Pain Assessment   Pain Assessment No/denies pain   Pain Score No Pain   Home Living   Type of Home House   Home Layout Two level   Prior Function   Level of [place] Needs assistance with ADLs and functional mobility   Lives With Daughter   ADL Assistance Needs assistance   IADLs Needs assistance   Falls in the last 6 months 1 to 4   Vocational Retired   ADL   Where Assessed Edge of bed   Eating Assistance 4  Minimal Assistance   Grooming Assistance 4  Minimal Assistance   [address] [address] Deficit Don/doff R sock; Don/doff L sock   Toileting Assistance  4  Minimal Assistance   Toileting Deficit Verbal cueing   Functional Assistance 4  Minimal Assistance   Additional Comments pt requires cues for initiation, cues for weight bearing on L UE  pt is limited by Decreaed standing balance, decreased activity tolerance, SOB/BECKMAN, decreased memory, poor recall, decreased safety      Bed Mobility   Rolling R 4  Minimal assistance   Rolling L 4  Minimal assistance   Transfers Sit to Stand 3  Moderate assistance   Stand to Sit 3  Moderate assistance   Stand pivot 3  Moderate assistance   Additional Comments Poor safety w/ controlled decent to seat  Balance   Static Sitting Fair   Dynamic Sitting Fair   Static Standing Fair   Dynamic Standing Poor   Ambulatory Poor   Activity Tolerance   Activity Tolerance Patient limited by fatigue   RUE Assessment   RUE Assessment WFL   LUE Assessment   LUE Assessment X  (Limited wrist ROM 2* hard Cast  fall w/ distal rad/ulnar fx)   Cognition   Overall Cognitive Status Impaired   Arousal/Participation Alert; Cooperative   Attention Attends with cues to redirect   Orientation Level Oriented to person   Memory Decreased short term memory;Decreased recall of recent events;Decreased recall of biographical information;Decreased long term memory   Following Commands Follows one step commands with increased time or repetition   Assessment   Limitation Decreased ADL status; Decreased UE strength;Decreased UE ROM; Decreased Safe judgement during ADL;Decreased cognition;Decreased endurance;Decreased high-level ADLs; Decreased self-care trans   Prognosis Fair   Assessment Pt is a 80 y o  female seen for OT evaluation s/p admit to [facility] on 12/28/2017 w/ Vomiting  OT evaluation and assessment of strength, ADL participation, and functional transfers completed  Pt unable to report PLOF at this time  Pt requires MIN A/MOD A  due to the following deficits impacting occupational performance,pt is limited by Decreaed standing balance, decreased activity tolerance, SOB/BECKMAN, decreased memory, poor recall, decreased safety  Educated Pt on safety precautions, importance of and how to use using call bell; currently using chair alarm  Educated Pt on role of Occupational Therapy in acute setting   Based on Pt extensive Occupational profile review, Pt benefits from from skilled OT services for I/ADL retraining to support the ability to safely participate in daily occupations safely and independently in the most least restrictive way  From OT standpoint  Recommend short term inpatient rehab when medically stable  Will continue to follow 3-5 x weekly to address the following below stated goals  Goals   Patient Goals unable to report goals at this time   Plan   Treatment Interventions ADL retraining;Functional transfer training; Endurance training; Compensatory technique education   Goal Expiration Date 01/08/18   Treatment Day (eval)   OT Frequency 3-5x/wk   Recommendation   OT Discharge Recommendation 24 hour supervision/assist  (Short Term rehab)   Barthel Index   Feeding 5   Bathing 0   Grooming Score 0   Dressing Score 0   Bladder Score 10   Bowels Score 10   Toilet Use Score 5   Transfers (Bed/Chair) Score 5   Mobility (Level Surface) Score 0   Stairs Score 0   Barthel Index Score 35      1) Pt will increase bed mobility to SBA and transfer EOB to participate in functional activity with G tolerance and balance  2) Pt will improve functional transfers to SBA on/off all surfaces using DME PRN w/ G balance/safety including toileting  3) Pt will complete Shower/bathe routine w/ Supervision sitting/standing as tolerated w/ appropriate use of AE and VC's for safety as needed  4) Pt will complete dressing routine w/ Supervision sitting/standing as tolerated w/ appropriate use of AE and VC's for safety as needed  5) Pt will complete toileting w/ SBA and VC's for safety as needed w/ G hygiene/thoroughness using DME PRN  6) Pt will improve activity tolerance to G for min 30 min txment sessions  7) Pt will participate in light grooming task with SBA using setup standing at sink ~3-5mins with G safety and balance

## 2017-12-29 NOTE — PLAN OF CARE
Problem: PHYSICAL THERAPY ADULT  Goal: Performs mobility at highest level of function for planned discharge setting  See evaluation for individualized goals  Treatment/Interventions: Spoke to nursing, Spoke to MD, Functional transfer training, Gait training, Bed mobility, OT  Equipment Recommended: Wheelchair       See flowsheet documentation for full assessment, interventions and recommendations  Outcome: Progressing  Prognosis: Fair  Problem List: Decreased mobility, Decreased endurance, Decreased range of motion, Decreased strength, Decreased cognition, Impaired judgement, Decreased safety awareness, Orthopedic restrictions, Impaired hearing  Assessment: pt is a 80year old female with recent hospital admission due to vomiting and diarrhea  pt has Hx of L radial/ulnar fx s/p fall at home and L wrist/forearm currently on cast  per ortho outpatient ortho appt on 12/22 was cancelled but to have pt NWB on L wrist but ok to WB through elbow at this time  Pt is a poor historian and demonstrates dec safety awareness affecting compliance with NWB precaution on L wrist requiring max cueing to complete functional mobilities safely  Pt tolerated PT session on 2L with SpO2 at rest ranging 97-99% to 97% after walking 20' Mod-max HHA on R UE  PT will trial Platform RW tomorrow  Pt will benefit from skilled PT services to improve functional mobility indep and safety using the LRAD  Barriers to Discharge: Inaccessible home environment, Decreased caregiver support  Barriers to Discharge Comments: will verify with family regarding home set up and availability to provide assistance to pt at d/c  Recommendation: Short-term skilled PT, 24 hour supervision/assist          See flowsheet documentation for full assessment

## 2017-12-29 NOTE — ASSESSMENT & PLAN NOTE
Patient is still having diarrhea as per the nurse  Although the frequency has decreased  She had 2-3 episodes since this morning  Continue to monitor  Will hold off on further stool studies for now as it is already getting better patient did not have any fever or night

## 2017-12-29 NOTE — PROGRESS NOTES
Progress Note - Yessi Moody 3/23/1920, 80 y o  female MRN: [id number]    Unit/Bed#: -Jorge Encounter: [id number]    Primary Care Provider: Vianey Lowe MD   Date and time admitted to hospital: 12/28/2017 12:24 PM        Diarrhea   Assessment & Plan    Patient is still having diarrhea as per the nurse  Although the frequency has decreased  She had 2-3 episodes since this morning  Continue to monitor  Will hold off on further stool studies for now as it is already getting better patient did not have any fever or night  Vomiting   Assessment & Plan    Most likely due to viral gastroenteritis  Resolved for now  Patient is still not eating enough  She said she did not feel like eating  Continue to monitor  Continue gentle IV fluids        Essential hypertension   Assessment & Plan    Still not well controlled  Will increase the dose of lisinopril to 40 mg  Continue metoprolol 50 b i d  for now  Dementia, early onset   Assessment & Plan    Patient currently at baseline  I called and spoke to the daughter who said that patient sometime dose is alert and sometimes is not  As per her this might be her baseline  She lives at home with her daughter  PT recommends rehab  VTE Pharmacologic Prophylaxis:   Pharmacologic: Enoxaparin (Lovenox)  Mechanical VTE Prophylaxis in Place: Yes    Patient Centered Rounds: I have performed bedside rounds with nursing staff today  Discussions with Specialists or Other Care Team Provider:     Education and Discussions with Family / Patient:  Called and informed patient's daughter  Time Spent for Care: 30 minutes  More than 50% of total time spent on counseling and coordination of care as described above      Current Length of Stay: 0 day(s)    Current Patient Status: Inpatient   Certification Statement: The patient will continue to require additional inpatient hospital stay due to above    Discharge Plan: to STR on next 2 days possibly    Code Status: Level 3 - DNAR and DNI      Subjective:   Pt seen and examined by me this morning  Pt complained of  feeling weak and sleepy  She does not remember why she is in the hospital   She denies any other complaints currently  She said she does not feel like eating  As per the nurse she did not have any vomiting since this morning  But she did have 2-3 episodes of diarrhea  Objective:     Vitals:   Temp (24hrs), Av 1 °F (37 3 °C), Min:98 4 °F (36 9 °C), Max:100 2 °F (37 9 °C)    HR:  [66-83] 70  Resp:  [17-18] 18  BP: (137-211)/(70-89) 160/70  SpO2:  [95 %-100 %] 97 %  Body mass index is 26 09 kg/m²  Input and Output Summary (last 24 hours): Intake/Output Summary (Last 24 hours) at 17 1512  Last data filed at 17 1241   Gross per 24 hour   Intake              340 ml   Output                0 ml   Net              340 ml       Physical Exam:     Physical Exam    Constitutional: Pt appears well-developed and well-nourished  Not in any acute distress  HENT:   Head: Normocephalic and atraumatic  Eyes: EOM are normal    Neck: Neck supple  Cardiovascular: Normal rate, regular rhythm, normal heart sounds and intact distal pulses  Exam reveals no gallop and no friction rub  No murmur heard  Pulmonary/Chest: Effort normal and breath sounds normal  No respiratory distress  Pt has no wheezes or rales  Abdominal: Soft  Nondistended, nontender  Bowel sounds are normal    Musculoskeletal: Normal range of motion  Neurological: alert and oriented to self only    Normal strength and sensations  Psychiatric: normal mood and affect  severe dementia      Additional Data:     Labs:      Results from last 7 days  Lab Units 17  0535 17  1300   WBC Thousand/uL 4 53 9 60   HEMOGLOBIN g/dL 10 9* 13 7   HEMATOCRIT % 33 2* 41 2   PLATELETS Thousands/uL 203  204 240   LYMPHO PCT %  --  4*   MONO PCT MAN %  --  4   EOSINO PCT MANUAL %  --  0       Results from last 7 days  Lab Units 12/29/17  0535 12/28/17  1300   SODIUM mmol/L 143 142   POTASSIUM mmol/L 3 5 3 4*   CHLORIDE mmol/L 112* 107   CO2 mmol/L 24 27   BUN mg/dL 10 13   CREATININE mg/dL 0 51* 0 68   CALCIUM mg/dL 8 0* 8 9   TOTAL PROTEIN g/dL  --  7 5   BILIRUBIN TOTAL mg/dL  --  0 94   ALK PHOS U/L  --  76   ALT U/L  --  13   AST U/L  --  18   GLUCOSE RANDOM mg/dL 92 131           * I Have Reviewed All Lab Data Listed Above  * Additional Pertinent Lab Tests Reviewed: Yuly 66 Admission Reviewed    Imaging:    Imaging Reports Reviewed Today Include:   Imaging Personally Reviewed by Myself Includes:      Recent Cultures (last 7 days):           Last 24 Hours Medication List:     EMS replenish medication  Does not apply Once   [START ON 1/1/2018] aspirin 81 mg Oral Once per day on Mon Thu   cholecalciferol 1,000 Units Oral BID   enoxaparin 40 mg Subcutaneous Daily   [START ON 12/30/2017] lisinopril 40 mg Oral Daily   metoprolol tartrate 50 mg Oral BID        Today, Patient Was Seen By: Maggie Cook MD    ** Please Note: Dictation voice to text software may have been used in the creation of this document   **

## 2017-12-29 NOTE — PROGRESS NOTES
Called Slim to informed that Pt has been diarrhea from 7am-7pm ( 5 time) , stool was watery, color from yellow to orange and then red, malodorous smell, pt has hemorrhoid  Hgb and  r/o C dif ordered  Will monitor pt

## 2017-12-29 NOTE — ASSESSMENT & PLAN NOTE
Patient currently at baseline  I called and spoke to the daughter who said that patient sometime dose is alert and sometimes is not  As per her this might be her baseline  She lives at home with her daughter  PT recommends rehab

## 2017-12-29 NOTE — CASE MANAGEMENT
Initial Clinical Review    Admission: Date/Time/Statement: Observation     Inpatient Admission Once     Transfer Service: General Medicine       Question Answer Comment   Admitting Physician Flavia LOJA    Level of Care Med Surg    Estimated length of stay More than 2 Midnights    Certification I certify that inpatient services are medically necessary for this patient for a duration of greater than two midnights  See H&P and MD Progress Notes for additional information about the patient's course of treatment  ED: Date/Time/Mode of Arrival:   ED Arrival Information     Expected Arrival Acuity Means of Arrival Escorted By Service Admission Type    - 12/28/2017 12:24 Urgent Ambulance [facility]ulance General Medicine Urgent    Arrival Complaint    vomiting          Chief Complaint:   Chief Complaint   Patient presents with    Vomiting     pt with 2 days vomiting and diarrhea  pt denies black stools   Diarrhea       History of Illness: 80 y o  female with a PMH early onset dementia, vitamin-D deficiency, osteoporosis, hypertension, and left radial/ulna fracture status post fall who presents with vomiting/diarrhea since 1 AM   Patient is a poor historian and information is gathered from her daughter whom she lives with  Per daughter patient symptoms started acutely at 1:00 a m  in which she had numerous episodes of nonbloody nonbilious emesis and numerous episodes of nonbloody loose stool  Per daughter no exposure to sick contacts however does report that multiple family members were at the house for the holidays  She also states patient has not ate any foods in which they have not ate  Denies fevers, chills, or cough      ED Vital Signs:   ED Triage Vitals [12/28/17 1234]   Temperature Pulse Respirations Blood Pressure SpO2   98 6 °F (37 °C) 90 18 147/69 96 %      Temp Source Heart Rate Source Patient Position - Orthostatic VS BP Location FiO2 (%)   Oral Monitor Lying Right arm --      Pain Score       No Pain        Wt Readings from Last 1 Encounters:   12/28/17 68 9 kg (152 lb)       Vital Signs (abnormal):   Date/Time  Temp  Pulse  Resp  BP  SpO2  O2 Device  Patient Position - Orthostatic VS   12/28/17 2338  98 7 °F (37 1 °C)  69  18   172/73  95 %  --  Lying   12/28/17 1917  100 2 °F (37 9 °C)  82  17   178/77  100 %  Nasal cannula  Lying   12/28/17 1654  --  80  18   211/89  97 %  None (Room air)  Lying   12/28/17 1534  --  83  18   189/89           Abnormal Labs:   12/28/17 1300     WBC 4 31 - 10 16 Thousand/uL 9 60    RBC 3 81 - 5 12 Million/uL 4 12    Hemoglobin 11 5 - 15 4 g/dL 13 7    Hematocrit 34 8 - 46 1 % 41 2      12/29/17 0535     WBC 4 31 - 10 16 Thousand/uL 4 53    RBC 3 81 - 5 12 Million/uL 3 32     Hemoglobin 11 5 - 15 4 g/dL 10 9     Hematocrit 34 8 - 46 1 % 33 2         Diagnostic Test Results: CT Abd/ Pelvis - Fluid-filled small and large bowel loops which may be indicative of diarrheal illness  No evidence of bowel obstruction or focal inflammatory changes      Horseshoe kidney  ED Treatment:   Medication Administration from 12/28/2017 1224 to 12/28/2017 1906       Date/Time Order Dose Route Action     12/28/2017 1304 sodium chloride 0 9 % bolus 1,000 mL 1,000 mL Intravenous New Bag     12/28/2017 1303 ondansetron (ZOFRAN) injection 4 mg 4 mg Intravenous Given     12/28/2017 1438 potassium chloride (K-DUR,KLOR-CON) CR tablet 40 mEq 40 mEq Oral Given     12/28/2017 1413 iohexol (OMNIPAQUE) 350 MG/ML injection (MULTI-DOSE) 100 mL 85 mL Intravenous Given          Past Medical/Surgical History:    Active Ambulatory Problems     Diagnosis Date Noted    No Active Ambulatory Problems     Resolved Ambulatory Problems     Diagnosis Date Noted    No Resolved Ambulatory Problems     Past Medical History:   Diagnosis Date    History of shingles 2013    Hypertension     Osteoarthritis        Admitting Diagnosis: Gastroenteritis [K52 9]  Vomiting [R11 10]  Generalized weakness [R53 1]    Age/Sex: 80 y o  female    Assessment/Plan:   Hospital Problem List:      Active Problems:    Vomiting    Diarrhea    Essential hypertension    Dementia, early onset    Vitamin D deficiency     Plan for the Primary Problem(s):  Vomiting/diarrhea,  Patient started acutely at 1:00 a m  with nonbloody nonbilious vomiting and nonbloody diarrhea  Per daughter no sick contacts but does report multiple family members at the house for the holiday  In the ED she received Zofran 4 mg IV and NSS 1000cc bolus  CT abd/pelvis, Fluid-filled small and large bowel loops which may be indicative of diarrheal illness   No evidence of bowel obstruction or focal inflammatory changes  Lipase, 65  Although daughter reports patient has not ate anything that they haven not ate, if continues with diarrhea or develops a fever please consider stool cultures, O&P  Ordered Zofran, prn  Ordered clear liquid diet     Plan for Additional Problems:   Hypertension,  She did not take any of her home medications today  Continue home medications of lisinopril and metoprolol     Early onset dementia,  Spoke with daughter who states patient is seeing her PCP and has decided not to initiate any medications at this point    Ordered fall risk precaution     Vitamin-D deficiency,  Continue vitamin-D3     Left radial/ulnar fracture s/p fall,  Supportive care with cast      Hypokalemia,  Potassium 3 4  Given KCl 40 mEq p  o  x1 dose in the ED      VTE Prophylaxis: Enoxaparin (Lovenox)  / sequential compression device   Code Status: DNI/DNR  POLST: POLST form is not discussed and not completed at this time        Admission Orders:  Clear liquid  Stool Enteric Bacterial Panel  PT/OT eval and treat    Scheduled Meds:   EMS replenish medication  Does not apply Once   [START ON 1/1/2018] aspirin 81 mg Oral Once per day on Mon Thu   cholecalciferol 1,000 Units Oral BID   enoxaparin 40 mg Subcutaneous Daily   lisinopril 20 mg Oral Daily metoprolol tartrate 50 mg Oral BID     Continuous Infusions:    PRN Meds: acetaminophen    ondansetron

## 2017-12-29 NOTE — SOCIAL WORK
CM spoke with Pt's daughter Faby Aleman via telephone to explain the CM role and discuss any potential D/C needs  Pt lives with daughter Faby Aleman in a 2 story home with 3STE  Pt requires 24hr assistance with ADL's that daughter provides, pt uses R/W for ambulation  Pt has current services with [facility]  Pt's home pharmacy is [facility]  Faby Aleman is reported POA  Per PT/OT Pt will require STR,  Faby Aleman agreeable and 1st choice is [facility] 2nd choice is [facility] and 3rd choice is [facility]  Referrals sent

## 2017-12-29 NOTE — ASSESSMENT & PLAN NOTE
Most likely due to viral gastroenteritis  Resolved for now  Patient is still not eating enough  She said she did not feel like eating    Continue to monitor  Continue gentle IV fluids

## 2017-12-29 NOTE — PHYSICAL THERAPY NOTE
Physical Therapy Evaluation    Patient Name: Ernesto Sanchez    TMYYA'P Date: 12/29/2017     Problem List  Patient Active Problem List   Diagnosis    Vomiting    Diarrhea    Essential hypertension    Dementia, early onset    Vitamin D deficiency        Past Medical History  Past Medical History:   Diagnosis Date    History of shingles 2013    Hypertension     Osteoarthritis         Past Surgical History  Past Surgical History:   Procedure Laterality Date    APPENDECTOMY      CHOLECYSTECTOMY      HYSTERECTOMY      TONSILECTOMY AND ADNOIDECTOMY      VARICOSE VEIN SURGERY           12/29/17 1005   Note Type   Note type Eval only   Pain Assessment   Pain Assessment 0-10   Pain Score 1   Pain Location Hand   Pain Orientation Right   Pain Descriptors Aching   Home Living   Type of Home House   Home Layout Two level   Home Equipment Walker   Prior Function   Level of [place] Needs assistance with IADLs; Needs assistance with ADLs and functional mobility   Lives With Daughter  (only able to recall living with dtr)   Receives Help From Family   ADL Assistance Needs assistance   IADLs Needs assistance   Falls in the last 6 months 1 to 4   Vocational Retired   Comments pt is a poor historian   Restrictions/Precautions   Wells Natali Bearing Precautions Per Order Yes   LUE Weight Bearing Per Order NWB  (on wrist but ok to WB through elbow per ortho 12/29/17)   Braces or Orthoses Splint  (L wrist/forearm - cast)   Other Precautions Fall Risk;WBS;Hard of hearing;Cognitive; Chair Alarm; Bed Alarm;O2  (2L )   General   Additional Pertinent History L radial/ulnar fx s/p fall 12/13/17, Hx of dementia, HTN   Family/Caregiver Present No   Cognition   Overall Cognitive Status Impaired   Arousal/Participation Cooperative   Orientation Level Oriented to person   Memory Decreased long term memory;Decreased recall of biographical information;Decreased short term memory;Decreased recall of recent events;Decreased recall of precautions   Following Commands Follows one step commands with increased time or repetition   RLE Assessment   RLE Assessment WFL   Strength RLE   RLE Overall Strength 4-/5   LLE Assessment   LLE Assessment WFL   Strength LLE   LLE Overall Strength 4-/5   Bed Mobility   Rolling R 4  Minimal assistance   Additional items Verbal cues  (to maintain NWB precaution)   Rolling L 4  Minimal assistance   Supine to Sit 4  Minimal assistance   Transfers   Sit to Stand 3  Moderate assistance   Additional items Verbal cues  (HHA on R UE with cues to maintain NWB on L wrist)   Stand to Sit 3  Moderate assistance   Stand pivot 3  Moderate assistance   Ambulation/Elevation   Gait pattern Improper Weight shift;Decreased foot clearance; Inconsistent vivian; Forward Flexion   Gait Assistance 2  Maximal assist   Additional items Other (Comment)  (mod-max of 1 HHA on R UE with cues not to use L UE )   Assistive Device (HHA)   Distance 20   Balance   Static Sitting Fair   Dynamic Sitting Fair -   Static Standing Poor +   Dynamic Standing Poor   Ambulatory Poor   Activity Tolerance   Activity Tolerance Patient limited by fatigue   Assessment   Prognosis Fair   Problem List Decreased mobility; Decreased endurance;Decreased range of motion;Decreased strength;Decreased cognition; Impaired judgement;Decreased safety awareness;Orthopedic restrictions; Impaired hearing   Assessment pt is a 80year old female with recent hospital admission due to vomiting and diarrhea  pt has Hx of L radial/ulnar fx s/p fall at home and L wrist/forearm currently on cast  per ortho outpatient ortho appt on 12/22 was cancelled but to have pt NWB on L wrist but ok to WB through elbow at this time  Pt is a poor historian and demonstrates dec safety awareness affecting compliance with NWB precaution on L wrist requiring max cueing to complete functional mobilities safely   Pt tolerated PT session on 2L with SpO2 at rest ranging 97-99% to 97% after walking 20' Mod-max HHA on R UE  PT will trial Platform RW tomorrow  Pt will benefit from skilled PT services to improve functional mobility indep and safety using the LRAD  Barriers to Discharge Inaccessible home environment;Decreased caregiver support   Barriers to Discharge Comments will verify with family regarding home set up and availability to provide assistance to pt at d/c   Goals   Patient Goals to walk better   STG Expiration Date 01/05/18   Short Term Goal #1 pt to complete bed/chair transfers at min A level  Short Term Goal #2 Pt to amb x 50' using LRAD requiring min A      Plan   Treatment/Interventions Spoke to nursing;Spoke to MD;Functional transfer training;Gait training;Bed mobility;OT   PT Frequency 5x/wk   Recommendation   Recommendation Short-term skilled PT;24 hour supervision/assist   Equipment Recommended Wheelchair   Barthel Index   Feeding 5   Bathing 0   Grooming Score 0   Dressing Score 5   Bladder Score 10   Bowels Score 10   Toilet Use Score 5   Transfers (Bed/Chair) Score 5   Mobility (Level Surface) Score 0   Stairs Score 0   Barthel Index Score 40

## 2017-12-29 NOTE — ASSESSMENT & PLAN NOTE
Still not well controlled  Will increase the dose of lisinopril to 40 mg  Continue metoprolol 50 b i d  for now

## 2017-12-30 LAB
ANION GAP SERPL CALCULATED.3IONS-SCNC: 10 MMOL/L (ref 4–13)
BUN SERPL-MCNC: 11 MG/DL (ref 5–25)
C DIFF TOX GENS STL QL NAA+PROBE: NORMAL
CALCIUM SERPL-MCNC: 8.2 MG/DL (ref 8.3–10.1)
CAMPYLOBACTER DNA SPEC NAA+PROBE: NORMAL
CHLORIDE SERPL-SCNC: 111 MMOL/L (ref 100–108)
CO2 SERPL-SCNC: 21 MMOL/L (ref 21–32)
CREAT SERPL-MCNC: 0.49 MG/DL (ref 0.6–1.3)
ERYTHROCYTE [DISTWIDTH] IN BLOOD BY AUTOMATED COUNT: 13.4 % (ref 11.6–15.1)
GFR SERPL CREATININE-BSD FRML MDRD: 82 ML/MIN/1.73SQ M
GLUCOSE SERPL-MCNC: 82 MG/DL (ref 65–140)
HCT VFR BLD AUTO: 32.3 % (ref 34.8–46.1)
HGB BLD-MCNC: 10.7 G/DL (ref 11.5–15.4)
MCH RBC QN AUTO: 33.1 PG (ref 26.8–34.3)
MCHC RBC AUTO-ENTMCNC: 33.1 G/DL (ref 31.4–37.4)
MCV RBC AUTO: 100 FL (ref 82–98)
PLATELET # BLD AUTO: 189 THOUSANDS/UL (ref 149–390)
PMV BLD AUTO: 9.5 FL (ref 8.9–12.7)
POTASSIUM SERPL-SCNC: 3.1 MMOL/L (ref 3.5–5.3)
RBC # BLD AUTO: 3.23 MILLION/UL (ref 3.81–5.12)
SALMONELLA DNA SPEC QL NAA+PROBE: NORMAL
SHIGA TOXIN STX GENE SPEC NAA+PROBE: NORMAL
SHIGELLA DNA SPEC QL NAA+PROBE: NORMAL
SODIUM SERPL-SCNC: 142 MMOL/L (ref 136–145)
WBC # BLD AUTO: 4.11 THOUSAND/UL (ref 4.31–10.16)

## 2017-12-30 PROCEDURE — 97530 THERAPEUTIC ACTIVITIES: CPT | Performed by: PHYSICAL THERAPIST

## 2017-12-30 PROCEDURE — 85027 COMPLETE CBC AUTOMATED: CPT | Performed by: INTERNAL MEDICINE

## 2017-12-30 PROCEDURE — 80048 BASIC METABOLIC PNL TOTAL CA: CPT | Performed by: INTERNAL MEDICINE

## 2017-12-30 PROCEDURE — 87505 NFCT AGENT DETECTION GI: CPT | Performed by: PHYSICIAN ASSISTANT

## 2017-12-30 PROCEDURE — 87505 NFCT AGENT DETECTION GI: CPT | Performed by: INTERNAL MEDICINE

## 2017-12-30 PROCEDURE — 87493 C DIFF AMPLIFIED PROBE: CPT | Performed by: PHYSICIAN ASSISTANT

## 2017-12-30 RX ORDER — AMLODIPINE BESYLATE 10 MG/1
10 TABLET ORAL DAILY
Status: DISCONTINUED | OUTPATIENT
Start: 2017-12-30 | End: 2018-01-05 | Stop reason: HOSPADM

## 2017-12-30 RX ORDER — POTASSIUM CHLORIDE 20 MEQ/1
40 TABLET, EXTENDED RELEASE ORAL 2 TIMES DAILY
Status: COMPLETED | OUTPATIENT
Start: 2017-12-30 | End: 2017-12-30

## 2017-12-30 RX ADMIN — AMLODIPINE BESYLATE 10 MG: 10 TABLET ORAL at 18:28

## 2017-12-30 RX ADMIN — ENOXAPARIN SODIUM 40 MG: 40 INJECTION SUBCUTANEOUS at 09:02

## 2017-12-30 RX ADMIN — METOPROLOL TARTRATE 50 MG: 50 TABLET ORAL at 09:02

## 2017-12-30 RX ADMIN — LISINOPRIL 40 MG: 20 TABLET ORAL at 09:02

## 2017-12-30 RX ADMIN — POTASSIUM CHLORIDE 40 MEQ: 1500 TABLET, EXTENDED RELEASE ORAL at 09:54

## 2017-12-30 RX ADMIN — VITAMIN D, TAB 1000IU (100/BT) 1000 UNITS: 25 TAB at 09:02

## 2017-12-30 RX ADMIN — METOPROLOL TARTRATE 50 MG: 50 TABLET ORAL at 17:24

## 2017-12-30 RX ADMIN — POTASSIUM CHLORIDE 40 MEQ: 1500 TABLET, EXTENDED RELEASE ORAL at 17:24

## 2017-12-30 RX ADMIN — VITAMIN D, TAB 1000IU (100/BT) 1000 UNITS: 25 TAB at 17:24

## 2017-12-30 RX ADMIN — SODIUM CHLORIDE 50 ML/HR: 0.9 INJECTION, SOLUTION INTRAVENOUS at 12:53

## 2017-12-30 NOTE — PHYSICIAN ADVISOR
Current patient class: Inpatient  The patient is currently on Hospital Day: 2      The patient was admitted to the hospital at 1502 on 12/29/17 for the following diagnosis:  Gastroenteritis [K52 9]  Vomiting [R11 10]  Generalized weakness [R53 1]       There is documentation in the medical record of an expected length of stay of at least 2 midnights  The patient is therefore expected to satisfy the 2 midnight benchmark and given the 2 midnight presumption is appropriate for INPATIENT ADMISSION  Given this expectation of a satisfying stay, CMS instructs us that the patient is most often appropriate for inpatient admission under part A provided medical necessity is documented in the chart  After review of the relevant documentation, labs, vital signs and test results, the patient is appropriate for INPATIENT ADMISSION  Admission to the hospital as an inpatient is a complex decision making process which requires the practitioner to consider the patients presenting complaint, history and physical examination and all relevant testing  With this in mind, in this case, the patient was deemed appropriate for INPATIENT ADMISSION  After review of the documentation and testing available at the time of the admission I concur with this clinical determination of medical necessity  Rationale is as follows: The patient is a 80 yrs old Female who presented to the ED at 12/28/2017 12:24 PM with a chief complaint of Vomiting (pt with 2 days vomiting and diarrhea  pt denies black stools  ) and Diarrhea     Patient continues to remain hospitalized with continued diarrhea  The patient will otherwise for at least a 2nd midnight for continued management  Patient is 80years old, does have comorbid conditions, and will satisfy the 2 midnight benchmark tonight  Given this, he is appropriate for inpatient admission      The patients vitals on arrival were ED Triage Vitals [12/28/17 1234]   Temperature Pulse Respirations Blood Pressure SpO2   98 6 °F (37 °C) 90 18 147/69 96 %      Temp Source Heart Rate Source Patient Position - Orthostatic VS BP Location FiO2 (%)   Oral Monitor Lying Right arm --      Pain Score       No Pain           Past Medical History:   Diagnosis Date    History of shingles 2013    Hypertension     Osteoarthritis      Past Surgical History:   Procedure Laterality Date    APPENDECTOMY      CHOLECYSTECTOMY      HYSTERECTOMY      TONSILECTOMY AND ADNOIDECTOMY      VARICOSE VEIN SURGERY             Consults have been placed to:   IP CONSULT TO CASE MANAGEMENT    Vitals:    12/29/17 1005 12/29/17 1611 12/29/17 1700 12/29/17 1749   BP: 160/70 (!) 182/80 165/70    Pulse: 70 66  68   Resp:  18     Temp:  98 8 °F (37 1 °C)     TempSrc:  Oral     SpO2:  98%     Weight:           Most recent labs:    Recent Labs      12/28/17   1300  12/29/17   0535  12/29/17   2213   WBC  9 60  4 53   --    HGB  13 7  10 9*  11 8   HCT  41 2  33 2*  36 2   PLT  240  203  204   --    K  3 4*  3 5   --    NA  142  143   --    CALCIUM  8 9  8 0*   --    BUN  13  10   --    CREATININE  0 68  0 51*   --    LIPASE  65*   --    --    AST  18   --    --    ALT  13   --    --    ALKPHOS  76   --    --    BILITOT  0 94   --    --        Scheduled Meds:  EMS replenish medication  Does not apply Once   [START ON 1/1/2018] aspirin 81 mg Oral Once per day on Mon Thu   cholecalciferol 1,000 Units Oral BID   enoxaparin 40 mg Subcutaneous Daily   [START ON 12/30/2017] lisinopril 40 mg Oral Daily   metoprolol tartrate 50 mg Oral BID     Continuous Infusions:  sodium chloride 50 mL/hr Last Rate: 50 mL/hr (12/29/17 1552)     PRN Meds: acetaminophen    ondansetron    Surgical procedures (if appropriate):

## 2017-12-30 NOTE — ASSESSMENT & PLAN NOTE
Patient is still having diarrhea as per the nurse  She had at least 5-6 episodes yesterday and 2-3 episodes this morning  Stool studies including C diff have been negative  Most likely viral   Continue to monitor  It does not resolve by tomorrow can try loperamide

## 2017-12-30 NOTE — PROGRESS NOTES
Progress Note - Valarie Wilkins 3/23/1920, 80 y o  female MRN: [id number]    Unit/Bed#: -01 Encounter: [id number]    Primary Care Provider: Yelitza Payne MD   Date and time admitted to hospital: 12/28/2017 12:24 PM        Vomiting   Assessment & Plan    Most likely due to viral gastroenteritis  Resolved for now  Patient is still not eating enough  She said she did not feel like eating  Continue to monitor  Continue gentle IV fluids        * Diarrhea   Assessment & Plan    Patient is still having diarrhea as per the nurse  She had at least 5-6 episodes yesterday and 2-3 episodes this morning  Stool studies including C diff have been negative  Most likely viral   Continue to monitor  It does not resolve by tomorrow can try loperamide  Essential hypertension   Assessment & Plan    Still not well controlled after increasing the dose of lisinopril to 40 mg  Continue metoprolol 50 b i d  for now  Start amlodipine 10 mg daily  Dementia, early onset   Assessment & Plan    Patient currently at baseline  I called and spoke to the daughter who said that patient sometime dose is alert and sometimes is not  As per her this might be her baseline  She lives at home with her daughter  PT recommends rehab  VTE Pharmacologic Prophylaxis:   Pharmacologic: Enoxaparin (Lovenox)  Mechanical VTE Prophylaxis in Place: Yes    Patient Centered Rounds: I have performed bedside rounds with nursing staff today  Discussions with Specialists or Other Care Team Provider:     Education and Discussions with Family / Patient:  Called and spoke to the daughter over the phone  Time Spent for Care: 30 minutes  More than 50% of total time spent on counseling and coordination of care as described above      Current Length of Stay: 1 day(s)    Current Patient Status: Inpatient   Certification Statement: The patient will continue to require additional inpatient hospital stay due to above    Discharge Plan: to STR once diarrhea resolves    Code Status: Level 3 - DNAR and DNI      Subjective:   Pt seen and examined by me this morning  Pt denies any complaints  She does not know that she has been having diarrhea but she told me that over night she was not able to sleep well because the nurses had to get some sample  Objective:     Vitals:   Temp (24hrs), Av 3 °F (36 8 °C), Min:98 2 °F (36 8 °C), Max:98 4 °F (36 9 °C)    HR:  [61-70] 61  Resp:  [18] 18  BP: (176-197)/(71-81) 178/72  SpO2:  [97 %-98 %] 97 %  Body mass index is 26 09 kg/m²  Input and Output Summary (last 24 hours): Intake/Output Summary (Last 24 hours) at 17 1741  Last data filed at 17 1323   Gross per 24 hour   Intake              690 ml   Output                0 ml   Net              690 ml       Physical Exam:     Physical Exam    Constitutional: Pt appears well-developed and well-nourished  Not in any acute distress  HENT:   Head: Normocephalic and atraumatic  Eyes: EOM are normal    Neck: Neck supple  Cardiovascular: Normal rate, regular rhythm, normal heart sounds and intact distal pulses  Exam reveals no gallop and no friction rub  No murmur heard  Pulmonary/Chest: Effort normal and breath sounds normal  No respiratory distress  Pt has no wheezes or rales  Abdominal: Soft  Nondistended, nontender  Bowel sounds are normal    Musculoskeletal: Normal range of motion  Neurological: alert and oriented to self only  Normal strength and sensations  Psychiatric: normal mood and affect  severe dementia  Additional Data:     Labs:      Results from last 7 days  Lab Units 17  0558  17  1300   WBC Thousand/uL 4 11*  < > 9 60   HEMOGLOBIN g/dL 10 7*  < > 13 7   HEMATOCRIT % 32 3*  < > 41 2   PLATELETS Thousands/uL 189  < > 240   LYMPHO PCT %  --   --  4*   MONO PCT MAN %  --   --  4   EOSINO PCT MANUAL %  --   --  0   < > = values in this interval not displayed      Results from last 7 days  Lab Units 12/30/17  0558  12/28/17  1300   SODIUM mmol/L 142  < > 142   POTASSIUM mmol/L 3 1*  < > 3 4*   CHLORIDE mmol/L 111*  < > 107   CO2 mmol/L 21  < > 27   BUN mg/dL 11  < > 13   CREATININE mg/dL 0 49*  < > 0 68   CALCIUM mg/dL 8 2*  < > 8 9   TOTAL PROTEIN g/dL  --   --  7 5   BILIRUBIN TOTAL mg/dL  --   --  0 94   ALK PHOS U/L  --   --  76   ALT U/L  --   --  13   AST U/L  --   --  18   GLUCOSE RANDOM mg/dL 82  < > 131   < > = values in this interval not displayed  * I Have Reviewed All Lab Data Listed Above  * Additional Pertinent Lab Tests Reviewed: Y[facility] 66 Admission Reviewed    Imaging:    Imaging Reports Reviewed Today Include:   Imaging Personally Reviewed by Myself Includes:      Recent Cultures (last 7 days):       Results from last 7 days  Lab Units 12/30/17  1539   C DIFF TOXIN B  NEGATIVE for C difficle toxin by PCR  Last 24 Hours Medication List:     EMS replenish medication  Does not apply Once   amLODIPine 10 mg Oral Daily   [START ON 1/1/2018] aspirin 81 mg Oral Once per day on Mon Thu   cholecalciferol 1,000 Units Oral BID   enoxaparin 40 mg Subcutaneous Daily   lisinopril 40 mg Oral Daily   metoprolol tartrate 50 mg Oral BID        Today, Patient Was Seen By: Jeffrey Bennett MD    ** Please Note: Dictation voice to text software may have been used in the creation of this document   **

## 2017-12-30 NOTE — ASSESSMENT & PLAN NOTE
Still not well controlled after increasing the dose of lisinopril to 40 mg  Continue metoprolol 50 b i d  for now  Start amlodipine 10 mg daily

## 2017-12-30 NOTE — PHYSICAL THERAPY NOTE
PHYSICAL THERAPY NOTE          Patient Name: Deb Puri  TTZMS'A Date: 12/30/2017 12/30/17 1612   Pain Assessment   Pain Assessment No/denies pain   Pain Score No Pain   Restrictions/Precautions   Weight Bearing Precautions Per Order Yes   LUE Weight Bearing Per Order NWB   Braces or Orthoses Splint   Other Precautions Chair Alarm; Bed Alarm; Fall Risk;Multiple lines;O2;Cognitive;Hard of hearing   General   Chart Reviewed Yes   Response to Previous Treatment Patient with no complaints from previous session  Cognition   Overall Cognitive Status Impaired   Arousal/Participation Alert; Cooperative   Orientation Level Oriented to person   Subjective   Subjective Pt reports she feels worse today than she did yesterday   Transfers   Sit to Stand 4  Minimal assistance   Additional items Verbal cues   Stand to Sit 4  Minimal assistance   Additional items Verbal cues   Stand pivot 3  Moderate assistance   Additional items Increased time required;Verbal cues   Toilet transfer 3  Moderate assistance   Additional items Verbal cues   Balance   Static Sitting Fair   Dynamic Sitting Fair -   Static Standing Fair -   Dynamic Standing Poor +   Assessment   Prognosis Fair   Problem List Decreased strength;Decreased endurance; Impaired balance;Decreased range of motion;Decreased mobility; Decreased cognition; Impaired judgement;Decreased safety awareness;Orthopedic restrictions; Impaired hearing   Assessment Pt participated in skilled PT session focusing on safety during transfers  Pt is non-compliant with LUE NWB and constantly tries to use it during transfers  Performed SPT from bed to w/c, SPT from w/c to toilet with RUE using grab bar  Pt confused throughout session and unable to state month or year  Performed SPT back to w/c , and then to recliner chair  Pt on 2L O2 and SpO2 was 98% after transfers   Practiced repeated sit>stands from recliner chair with therapist cradling LUE to prevent pt from using it; pt was able to perform @ CG level by end of repititions  Pt left seated in recliner chair with chair alarm activated; RN aware  Pt will cont to benefit from skilled PT to achieve goals and maximize safety with all functional mobility      Barriers to Discharge Inaccessible home environment;Decreased caregiver support   Plan   Treatment/Interventions Functional transfer training;Bed mobility;Spoke to nursing   Progress Progressing toward goals   PT Frequency 5x/wk   Recommendation   Recommendation Short-term skilled PT   Equipment Recommended Wheelchair

## 2017-12-30 NOTE — PLAN OF CARE
Problem: PHYSICAL THERAPY ADULT  Goal: Performs mobility at highest level of function for planned discharge setting  See evaluation for individualized goals  Treatment/Interventions: Functional transfer training, Bed mobility, Spoke to nursing  Equipment Recommended: Wheelchair       See flowsheet documentation for full assessment, interventions and recommendations  Outcome: Progressing  Prognosis: Fair  Problem List: Decreased strength, Decreased endurance, Impaired balance, Decreased range of motion, Decreased mobility, Decreased cognition, Impaired judgement, Decreased safety awareness, Orthopedic restrictions, Impaired hearing  Assessment: Pt participated in skilled PT session focusing on safety during transfers  Pt is non-compliant with LUE NWB and constantly tries to use it during transfers  Performed SPT from bed to w/c, SPT from w/c to toilet with RUE using grab bar  Pt confused throughout session and unable to state month or year  Performed SPT back to w/c , and then to recliner chair  Pt on 2L O2 and SpO2 was 98% after transfers  Practiced repeated sit>stands from recliner chair with therapist cradling LUE to prevent pt from using it; pt was able to perform @ CG level by end of repititions  Pt left seated in recliner chair with chair alarm activated; RN aware  Pt will cont to benefit from skilled PT to achieve goals and maximize safety with all functional mobility  Barriers to Discharge: Inaccessible home environment, Decreased caregiver support  Barriers to Discharge Comments: will verify with family regarding home set up and availability to provide assistance to pt at d/c  Recommendation: Short-term skilled PT          See flowsheet documentation for full assessment

## 2017-12-31 ENCOUNTER — APPOINTMENT (INPATIENT)
Dept: RADIOLOGY | Facility: HOSPITAL | Age: 82
DRG: 392 | End: 2017-12-31
Payer: MEDICARE

## 2017-12-31 PROBLEM — S52.502A DISTAL RADIUS FRACTURE, LEFT: Status: ACTIVE | Noted: 2017-12-31

## 2017-12-31 LAB
ANION GAP SERPL CALCULATED.3IONS-SCNC: 9 MMOL/L (ref 4–13)
BUN SERPL-MCNC: 6 MG/DL (ref 5–25)
CALCIUM SERPL-MCNC: 8.1 MG/DL (ref 8.3–10.1)
CAMPYLOBACTER DNA SPEC NAA+PROBE: NORMAL
CHLORIDE SERPL-SCNC: 114 MMOL/L (ref 100–108)
CO2 SERPL-SCNC: 21 MMOL/L (ref 21–32)
CREAT SERPL-MCNC: 0.44 MG/DL (ref 0.6–1.3)
ERYTHROCYTE [DISTWIDTH] IN BLOOD BY AUTOMATED COUNT: 13.3 % (ref 11.6–15.1)
GFR SERPL CREATININE-BSD FRML MDRD: 85 ML/MIN/1.73SQ M
GLUCOSE SERPL-MCNC: 81 MG/DL (ref 65–140)
HCT VFR BLD AUTO: 32.1 % (ref 34.8–46.1)
HGB BLD-MCNC: 10.7 G/DL (ref 11.5–15.4)
MCH RBC QN AUTO: 32.8 PG (ref 26.8–34.3)
MCHC RBC AUTO-ENTMCNC: 33.3 G/DL (ref 31.4–37.4)
MCV RBC AUTO: 99 FL (ref 82–98)
PLATELET # BLD AUTO: 194 THOUSANDS/UL (ref 149–390)
PMV BLD AUTO: 9.8 FL (ref 8.9–12.7)
POTASSIUM SERPL-SCNC: 3.3 MMOL/L (ref 3.5–5.3)
RBC # BLD AUTO: 3.26 MILLION/UL (ref 3.81–5.12)
SALMONELLA DNA SPEC QL NAA+PROBE: NORMAL
SHIGA TOXIN STX GENE SPEC NAA+PROBE: NORMAL
SHIGELLA DNA SPEC QL NAA+PROBE: NORMAL
SODIUM SERPL-SCNC: 144 MMOL/L (ref 136–145)
WBC # BLD AUTO: 4.51 THOUSAND/UL (ref 4.31–10.16)

## 2017-12-31 PROCEDURE — 97530 THERAPEUTIC ACTIVITIES: CPT | Performed by: PHYSICAL THERAPIST

## 2017-12-31 PROCEDURE — 85027 COMPLETE CBC AUTOMATED: CPT | Performed by: INTERNAL MEDICINE

## 2017-12-31 PROCEDURE — 73110 X-RAY EXAM OF WRIST: CPT

## 2017-12-31 PROCEDURE — 80048 BASIC METABOLIC PNL TOTAL CA: CPT | Performed by: INTERNAL MEDICINE

## 2017-12-31 RX ORDER — POTASSIUM CHLORIDE 20 MEQ/1
40 TABLET, EXTENDED RELEASE ORAL ONCE
Status: COMPLETED | OUTPATIENT
Start: 2017-12-31 | End: 2017-12-31

## 2017-12-31 RX ADMIN — METOPROLOL TARTRATE 50 MG: 50 TABLET ORAL at 08:59

## 2017-12-31 RX ADMIN — ONDANSETRON 4 MG: 2 INJECTION INTRAMUSCULAR; INTRAVENOUS at 09:00

## 2017-12-31 RX ADMIN — SODIUM CHLORIDE 50 ML/HR: 0.9 INJECTION, SOLUTION INTRAVENOUS at 12:27

## 2017-12-31 RX ADMIN — AMLODIPINE BESYLATE 10 MG: 10 TABLET ORAL at 08:59

## 2017-12-31 RX ADMIN — POTASSIUM CHLORIDE 40 MEQ: 1500 TABLET, EXTENDED RELEASE ORAL at 08:59

## 2017-12-31 RX ADMIN — VITAMIN D, TAB 1000IU (100/BT) 1000 UNITS: 25 TAB at 08:59

## 2017-12-31 RX ADMIN — LISINOPRIL 40 MG: 20 TABLET ORAL at 08:59

## 2017-12-31 RX ADMIN — ENOXAPARIN SODIUM 40 MG: 40 INJECTION SUBCUTANEOUS at 08:59

## 2017-12-31 RX ADMIN — METOPROLOL TARTRATE 50 MG: 50 TABLET ORAL at 17:27

## 2017-12-31 NOTE — ASSESSMENT & PLAN NOTE
As per the nurse patient only had little bit of mucousy stool  She denies any significant loose bowel movements  Stool studies including C diff have been negative    Most likely viral

## 2017-12-31 NOTE — PROGRESS NOTES
Progress Note - Ten Pat 3/23/1920, 80 y o  female MRN: [id number]    Unit/Bed#: -01 Encounter: [id number]    Primary Care Provider: Alexandria Farias MD   Date and time admitted to hospital: 12/28/2017 12:24 PM        Vomiting   Assessment & Plan    Most likely due to viral gastroenteritis  Resolved for now  Continue to monitor  DC IV fluids        * Diarrhea   Assessment & Plan    As per the nurse patient only had little bit of mucousy stool  She denies any significant loose bowel movements  Stool studies including C diff have been negative  Most likely viral           Essential hypertension   Assessment & Plan    Blood pressure well controlled today  Cont lisinopril to 40 mg which was increased on home dose of 20  Continue metoprolol 50 b i d  for now  Continue amlodipine 10 mg daily which is new medication  Distal radius fracture, left   Assessment & Plan    Patient had fracture 2 weeks ago following a fall  No surgical interventions were done and she has a cast   She was supposed to get a repeat x-ray of the wrist on 12/29/2017 and then follow up with orthopedic surgeon at [place] [address]  as outpatient  Will get an x-ray for now  Patient to follow up with outpatient orthopedic surgeon at [place] [address]  Dementia, early onset   Assessment & Plan    Patient currently at baseline as per daughter  She lives at home with her daughter  PT recommends rehab  VTE Pharmacologic Prophylaxis:   Pharmacologic: Enoxaparin (Lovenox)  Mechanical VTE Prophylaxis in Place: Yes     Patient Centered Rounds: I have performed bedside rounds with nursing staff today      Discussions with Specialists or Other Care Team Provider:      Education and Discussions with Family / Patient:  Called and spoke to the daughter over the phone      Time Spent for Care: 30 minutes    More than 50% of total time spent on counseling and coordination of care as described above      Current Length of Stay: 2 day(s)     Current Patient Status: Inpatient   Certification Statement: The patient will continue to require additional inpatient hospital stay due to above     Discharge Plan: to STR once diarrhea resolves     Code Status: Level 3 - DNAR and DNI        Subjective:   Pt seen and examined by me this morning  Pt denies any complaints  No abdominal pain nausea, vomiting, chest pain, palpitations shortness of breath  Objective:     Vitals:   Temp (24hrs), Av 5 °F (36 9 °C), Min:98 1 °F (36 7 °C), Max:98 8 °F (37 1 °C)    HR:  [73-86] 86  Resp:  [14-20] 20  BP: (131-171)/() 171/102  SpO2:  [95 %] 95 %  Body mass index is 26 09 kg/m²  Input and Output Summary (last 24 hours): Intake/Output Summary (Last 24 hours) at 17 1543  Last data filed at 17 1355   Gross per 24 hour   Intake          1250 83 ml   Output                0 ml   Net          1250 83 ml       Physical Exam:     Physical Exam    Constitutional: Pt appears well-developed and well-nourished  Not in any acute distress  Cardiovascular: Normal rate, regular rhythm, normal heart sounds and intact distal pulses   Exam reveals no gallop and no friction rub   No murmur heard  Pulmonary/Chest: Effort normal and breath sounds normal  No respiratory distress  Pt has no wheezes or rales  Abdominal: Soft   Nondistended, nontender  Bowel sounds are normal    Musculoskeletal: Normal range of motion  Neurological: alert and oriented to self only  Normal strength and sensations  Psychiatric: normal mood and affect   severe dementia  Additional Data:     Labs:      Results from last 7 days  Lab Units 17  0542  17  1300   WBC Thousand/uL 4 51  < > 9 60   HEMOGLOBIN g/dL 10 7*  < > 13 7   HEMATOCRIT % 32 1*  < > 41 2   PLATELETS Thousands/uL 194  < > 240   LYMPHO PCT %  --   --  4*   MONO PCT MAN %  --   --  4   EOSINO PCT MANUAL %  --   --  0   < > = values in this interval not displayed      Results from last 7 days  Lab Units 12/31/17  0542  12/28/17  1300   SODIUM mmol/L 144  < > 142   POTASSIUM mmol/L 3 3*  < > 3 4*   CHLORIDE mmol/L 114*  < > 107   CO2 mmol/L 21  < > 27   BUN mg/dL 6  < > 13   CREATININE mg/dL 0 44*  < > 0 68   CALCIUM mg/dL 8 1*  < > 8 9   TOTAL PROTEIN g/dL  --   --  7 5   BILIRUBIN TOTAL mg/dL  --   --  0 94   ALK PHOS U/L  --   --  76   ALT U/L  --   --  13   AST U/L  --   --  18   GLUCOSE RANDOM mg/dL 81  < > 131   < > = values in this interval not displayed  * I Have Reviewed All Lab Data Listed Above  * Additional Pertinent Lab Tests Reviewed: [facility] 66 Admission Reviewed    Imaging:    Imaging Reports Reviewed Today Include:   Imaging Personally Reviewed by Myself Includes:      Recent Cultures (last 7 days):       Results from last 7 days  Lab Units 12/30/17  1539   C DIFF TOXIN B  NEGATIVE for C difficle toxin by PCR  Last 24 Hours Medication List:     EMS replenish medication  Does not apply Once   amLODIPine 10 mg Oral Daily   [START ON 1/1/2018] aspirin 81 mg Oral Once per day on Mon Thu   cholecalciferol 1,000 Units Oral BID   enoxaparin 40 mg Subcutaneous Daily   lisinopril 40 mg Oral Daily   metoprolol tartrate 50 mg Oral BID        Today, Patient Was Seen By: Jackie Prieto MD    ** Please Note: Dictation voice to text software may have been used in the creation of this document   **

## 2017-12-31 NOTE — PHYSICAL THERAPY NOTE
PHYSICAL THERAPY NOTE          Patient Name: Steven Hernandez  UBVAP'S Date: 12/31/2017 12/31/17 0850   Pain Assessment   Pain Assessment No/denies pain   Pain Score No Pain   Restrictions/Precautions   Weight Bearing Precautions Per Order Yes   LUE Weight Bearing Per Order NWB   Braces or Orthoses Splint   Other Precautions Chair Alarm; Bed Alarm; Fall Risk;Multiple lines;Hard of hearing;Cognitive   General   Chart Reviewed Yes   Cognition   Overall Cognitive Status Impaired   Arousal/Participation Alert; Cooperative   Subjective   Subjective Pt reports she thinks she might throw up    Bed Mobility   Supine to Sit 5  Supervision   Sit to Supine 4  Minimal assistance   Additional Comments assist to maintain NWB LUE   Transfers   Sit to Stand 4  Minimal assistance   Additional items Verbal cues   Stand to Sit 4  Minimal assistance   Additional items Verbal cues   Balance   Static Sitting Fair   Dynamic Sitting Fair -   Static Standing Fair -   Dynamic Standing Poor +   Assessment   Prognosis Fair   Problem List Decreased strength;Decreased endurance; Impaired balance;Decreased range of motion;Decreased mobility; Decreased cognition; Impaired judgement;Decreased safety awareness; Impaired hearing;Orthopedic restrictions   Assessment Pt reports feeling tired but agreeable to get OOB  Pt was able to sit EOB @ S level although she was still trying to use LUE to assist  Pt performed sit> order for therapist to pull briefs up; assist from rehab aide to cradle LUE so pt could not use it  After sitting back down on bed pt reported feeling like she might throw up; provided pt with basin  Pt then requested to lay back down in bed 2* not feeling good  Pt was able to lay back down @ S level (with therapist holding LUE to prevent use); pt was also able to scoot up higher in the bed on her own   Session terminated 2* pt feeling nauseous; informed RN Kenneth  Pt will cont to benefit from skilled PT to maximize I and safety with all functional mobility while maintaining NWB LUE  Barriers to Discharge Decreased caregiver support; Inaccessible home environment   Plan   Treatment/Interventions Functional transfer training   Progress Progressing toward goals   PT Frequency 5x/wk   Recommendation   Recommendation Short-term skilled PT   Equipment Recommended Wheelchair

## 2017-12-31 NOTE — ASSESSMENT & PLAN NOTE
Patient currently at baseline as per daughter  She lives at home with her daughter  PT recommends rehab

## 2017-12-31 NOTE — PROGRESS NOTES
Called and informed Nanetta Severe (daughter) that PCA saw her mother assist herself to the floor  Told her that patient did not complain of any pain  Also, made her aware we needed some navigator questions answered and paperwork signed when she comes back  Daughter said she should be in tomorrow

## 2017-12-31 NOTE — PLAN OF CARE
Problem: PHYSICAL THERAPY ADULT  Goal: Performs mobility at highest level of function for planned discharge setting  See evaluation for individualized goals  Treatment/Interventions: Functional transfer training  Equipment Recommended: Wheelchair       See flowsheet documentation for full assessment, interventions and recommendations  Outcome: Progressing  Prognosis: Fair  Problem List: Decreased strength, Decreased endurance, Impaired balance, Decreased range of motion, Decreased mobility, Decreased cognition, Impaired judgement, Decreased safety awareness, Impaired hearing, Orthopedic restrictions  Assessment: Pt reports feeling tired but agreeable to get OOB  Pt was able to sit EOB @ S level although she was still trying to use LUE to assist  Pt performed sit> order for therapist to pull briefs up; assist from rehab aide to cradle LUE so pt could not use it  After sitting back down on bed pt reported feeling like she might throw up; provided pt with basin  Pt then requested to lay back down in bed 2* not feeling good  Pt was able to lay back down @ S level (with therapist holding LUE to prevent use); pt was also able to scoot up higher in the bed on her own  Session terminated 2* pt feeling nauseous; informed RN Kenneth  Pt will cont to benefit from skilled PT to maximize I and safety with all functional mobility while maintaining NWB LUE  Barriers to Discharge: Decreased caregiver support, Inaccessible home environment  Barriers to Discharge Comments: will verify with family regarding home set up and availability to provide assistance to pt at d/c  Recommendation: Short-term skilled PT          See flowsheet documentation for full assessment

## 2017-12-31 NOTE — PROGRESS NOTES
Patient assisted herself to the floor  Bed alarm was going off  Dane Roberthe PCA was walking to the room and she saw patient assist herself to the floor  PCA said she did not hit her head  She was helped back into bed  Informed Dr Christiano Mcnulty  He did not write any new orders at this time

## 2017-12-31 NOTE — ASSESSMENT & PLAN NOTE
Patient had fracture 2 weeks ago following a fall  No surgical interventions were done and she has a cast   She was supposed to get a repeat x-ray of the wrist on 12/29/2017 and then follow up with orthopedic surgeon at [place] [address]  as outpatient  Will get an x-ray for now  Patient to follow up with outpatient orthopedic surgeon at [place] [address]

## 2017-12-31 NOTE — ASSESSMENT & PLAN NOTE
Blood pressure well controlled today  Cont lisinopril to 40 mg which was increased on home dose of 20  Continue metoprolol 50 b i d  for now  Continue amlodipine 10 mg daily which is new medication

## 2018-01-01 DIAGNOSIS — I10 ESSENTIAL (PRIMARY) HYPERTENSION: ICD-10-CM

## 2018-01-01 DIAGNOSIS — R53.83 OTHER FATIGUE: ICD-10-CM

## 2018-01-01 RX ADMIN — LISINOPRIL 40 MG: 20 TABLET ORAL at 09:16

## 2018-01-01 RX ADMIN — AMLODIPINE BESYLATE 10 MG: 10 TABLET ORAL at 09:16

## 2018-01-01 RX ADMIN — ENOXAPARIN SODIUM 40 MG: 40 INJECTION SUBCUTANEOUS at 09:16

## 2018-01-01 RX ADMIN — ASPIRIN 81 MG 81 MG: 81 TABLET ORAL at 09:16

## 2018-01-01 RX ADMIN — VITAMIN D, TAB 1000IU (100/BT) 1000 UNITS: 25 TAB at 09:16

## 2018-01-01 RX ADMIN — METOPROLOL TARTRATE 50 MG: 50 TABLET ORAL at 09:16

## 2018-01-01 RX ADMIN — VITAMIN D, TAB 1000IU (100/BT) 1000 UNITS: 25 TAB at 17:18

## 2018-01-01 RX ADMIN — METOPROLOL TARTRATE 50 MG: 50 TABLET ORAL at 17:18

## 2018-01-01 NOTE — ASSESSMENT & PLAN NOTE
Patient had fracture 2 weeks ago following a fall  No surgical interventions were done and she has a cast   She was supposed to get a repeat x-ray of the wrist on 12/29/2017 and then follow up with orthopedic surgeon at [facility]) as outpatient    Repeat x-ray done here shows that the fracture is stable

## 2018-01-01 NOTE — ASSESSMENT & PLAN NOTE
Blood pressure well controlled  Cont lisinopril to 40 mg which was increased on home dose of 20  Continue metoprolol 50 b i d  for now  Continue amlodipine 10 mg daily which is new medication

## 2018-01-01 NOTE — PROGRESS NOTES
Progress Note - Lauren Lan 3/23/1920, 80 y o  female MRN: [id number]    Unit/Bed#: -01 Encounter: [id number]    Primary Care Provider: Leena Cordova MD   Date and time admitted to hospital: 12/28/2017 12:24 PM        Vomiting   Assessment & Plan    Most likely due to viral gastroenteritis  Resolved for now  Continue to monitor        * Diarrhea   Assessment & Plan    Resolved for now  Most likely viral gastroenteritis  Stool studies including C diff have been negative  Essential hypertension   Assessment & Plan    Blood pressure well controlled  Cont lisinopril to 40 mg which was increased on home dose of 20  Continue metoprolol 50 b i d  for now  Continue amlodipine 10 mg daily which is new medication  Distal radius fracture, left   Assessment & Plan    Patient had fracture 2 weeks ago following a fall  No surgical interventions were done and she has a cast   She was supposed to get a repeat x-ray of the wrist on 12/29/2017 and then follow up with orthopedic surgeon at [place] [id number]  as outpatient  Repeat x-ray done here shows that the fracture is stable        Dementia, early onset   Assessment & Plan    Patient currently at baseline as per daughter  She lives at home with her daughter  PT recommends rehab  VTE Pharmacologic Prophylaxis:   Pharmacologic: Enoxaparin (Lovenox)  Mechanical VTE Prophylaxis in Place: Yes    Patient Centered Rounds: I have performed bedside rounds with nursing staff today  Discussions with Specialists or Other Care Team Provider:     Education and Discussions with Family / Patient: daughter at bedside    Time Spent for Care: 30 minutes  More than 50% of total time spent on counseling and coordination of care as described above      Current Length of Stay: 3 day(s)    Current Patient Status: Inpatient   Certification Statement: The patient will continue to require additional inpatient hospital stay due to pending placement    Discharge Plan: pending placement to [facility]    Code Status: Level 3 - DNAR and DNI      Subjective:   Pt seen and examined by me this morning  Pt denied any complains  Objective:     Vitals:   Temp (24hrs), Av 7 °F (37 1 °C), Min:98 3 °F (36 8 °C), Max:99 4 °F (37 4 °C)    HR:  [65-88] 88  Resp:  [18-20] 18  BP: (124-184)/() 127/55  SpO2:  [93 %-95 %] 93 %  Body mass index is 26 09 kg/m²  Input and Output Summary (last 24 hours): Intake/Output Summary (Last 24 hours) at 18 1405  Last data filed at 18 1255   Gross per 24 hour   Intake              630 ml   Output                0 ml   Net              630 ml       Physical Exam:     Physical Exam    Constitutional: Pt appears well-developed and well-nourished  Not in any acute distress  Cardiovascular: Normal rate, regular rhythm, normal heart sounds and intact distal pulses   Exam reveals no gallop and no friction rub   No murmur heard  Pulmonary/Chest: Effort normal and breath sounds normal  No respiratory distress  Pt has no wheezes or rales  Abdominal: Soft   Nondistended, nontender  Bowel sounds are normal    Musculoskeletal: Normal range of motion  Neurological: alert and oriented to self only  Normal strength and sensations  Psychiatric: normal mood and affect   severe dementia  Additional Data:     Labs:      Results from last 7 days  Lab Units 17  0542  17  1300   WBC Thousand/uL 4 51  < > 9 60   HEMOGLOBIN g/dL 10 7*  < > 13 7   HEMATOCRIT % 32 1*  < > 41 2   PLATELETS Thousands/uL 194  < > 240   LYMPHO PCT %  --   --  4*   MONO PCT MAN %  --   --  4   EOSINO PCT MANUAL %  --   --  0   < > = values in this interval not displayed      Results from last 7 days  Lab Units 17  0542  17  1300   SODIUM mmol/L 144  < > 142   POTASSIUM mmol/L 3 3*  < > 3 4*   CHLORIDE mmol/L 114*  < > 107   CO2 mmol/L 21  < > 27   BUN mg/dL 6  < > 13   CREATININE mg/dL 0 44*  < > 0 68   CALCIUM mg/dL 8 1* < > 8 9   TOTAL PROTEIN g/dL  --   --  7 5   BILIRUBIN TOTAL mg/dL  --   --  0 94   ALK PHOS U/L  --   --  76   ALT U/L  --   --  13   AST U/L  --   --  18   GLUCOSE RANDOM mg/dL 81  < > 131   < > = values in this interval not displayed  * I Have Reviewed All Lab Data Listed Above  * Additional Pertinent Lab Tests Reviewed: [facility] Admission Reviewed    Imaging:    Imaging Reports Reviewed Today Include:   Imaging Personally Reviewed by Myself Includes:      Recent Cultures (last 7 days):       Results from last 7 days  Lab Units 12/30/17  1539   C DIFF TOXIN B  NEGATIVE for C difficle toxin by PCR  Last 24 Hours Medication List:     EMS replenish medication  Does not apply Once   amLODIPine 10 mg Oral Daily   aspirin 81 mg Oral Once per day on Mon Thu   cholecalciferol 1,000 Units Oral BID   enoxaparin 40 mg Subcutaneous Daily   lisinopril 40 mg Oral Daily   metoprolol tartrate 50 mg Oral BID        Today, Patient Was Seen By: Julieta Anthony MD    ** Please Note: Dictation voice to text software may have been used in the creation of this document   **

## 2018-01-01 NOTE — ASSESSMENT & PLAN NOTE
Resolved for now  Most likely viral gastroenteritis  Stool studies including C diff have been negative

## 2018-01-02 ENCOUNTER — APPOINTMENT (INPATIENT)
Dept: RADIOLOGY | Facility: HOSPITAL | Age: 83
DRG: 392 | End: 2018-01-02
Payer: MEDICARE

## 2018-01-02 PROBLEM — R19.7 DIARRHEA: Status: RESOLVED | Noted: 2017-12-28 | Resolved: 2018-01-02

## 2018-01-02 PROBLEM — R29.6 UNWITNESSED FALL: Status: ACTIVE | Noted: 2018-01-02

## 2018-01-02 PROBLEM — S51.012A SKIN TEAR OF LEFT ELBOW WITHOUT COMPLICATION: Status: ACTIVE | Noted: 2018-01-02

## 2018-01-02 PROBLEM — R11.10 VOMITING: Status: RESOLVED | Noted: 2017-12-28 | Resolved: 2018-01-02

## 2018-01-02 PROCEDURE — 97116 GAIT TRAINING THERAPY: CPT

## 2018-01-02 PROCEDURE — 73080 X-RAY EXAM OF ELBOW: CPT

## 2018-01-02 PROCEDURE — 73110 X-RAY EXAM OF WRIST: CPT

## 2018-01-02 PROCEDURE — 70450 CT HEAD/BRAIN W/O DYE: CPT

## 2018-01-02 PROCEDURE — 97530 THERAPEUTIC ACTIVITIES: CPT

## 2018-01-02 PROCEDURE — 97535 SELF CARE MNGMENT TRAINING: CPT

## 2018-01-02 RX ADMIN — ENOXAPARIN SODIUM 40 MG: 40 INJECTION SUBCUTANEOUS at 08:50

## 2018-01-02 RX ADMIN — VITAMIN D, TAB 1000IU (100/BT) 1000 UNITS: 25 TAB at 17:17

## 2018-01-02 RX ADMIN — VITAMIN D, TAB 1000IU (100/BT) 1000 UNITS: 25 TAB at 08:49

## 2018-01-02 RX ADMIN — LISINOPRIL 40 MG: 20 TABLET ORAL at 08:47

## 2018-01-02 RX ADMIN — METOPROLOL TARTRATE 50 MG: 50 TABLET ORAL at 17:17

## 2018-01-02 RX ADMIN — METOPROLOL TARTRATE 50 MG: 50 TABLET ORAL at 08:49

## 2018-01-02 RX ADMIN — AMLODIPINE BESYLATE 10 MG: 10 TABLET ORAL at 08:49

## 2018-01-02 NOTE — ASSESSMENT & PLAN NOTE
-up to date on tetanus as far as I can tell  -wrapped with non adeherent gauze, does not require primary closure  Continue daily dressing

## 2018-01-02 NOTE — PROGRESS NOTES
[facility] Progress Note  Patient: Valarie Wilkins 80 y o  female   MRN: [id number]  PCP: Yelitza Payne MD  Unit/Bed#: -68 Encounter: [id number]  Date Of Visit: 01/02/18    Assessment:    Active Problems:    Essential hypertension    Dementia, early onset    Vitamin D deficiency    Distal radius fracture, left      Plan:    · Diarrhea  · Has resolved  · Suspect viral gastroenteritis  Stool enteric bacterial panel by PCR and C  Diff by PCR all negative  · Vomiting  · Resolved  · Suspect secondary to viral gastroenteritis  · Diet advanced from clears to regular   · Hypertension  · BP stable; most recently 132/61  · Continue current regimen which includes:  · Lisinopril 40 (of note, this is increased from her home dose of 20mg) daily  · Metoprolol tartrate 50mg BID  · Amlodipine 10mg daily (this is new as of 12/30/17)  · Left distal radial fracture and ulnar styloid fracture  · Patient has a cast on  She is to follow up with her orthopaedist at [place] [id number]  upon discharge  · Dementia   · Discussed with the patient's daughter today over the phone and again at bedside who informed me that patient's mental status is at baseline  · PT recommending rehab - daughter is agreeable       VTE Pharmacologic Prophylaxis:   Pharmacologic: Enoxaparin (Lovenox)  Mechanical VTE Prophylaxis in Place: Yes    Patient Centered Rounds: I have performed bedside rounds with nursing staff today  [address] with Specialists or Other Care Team Provider: Nursing, case management     Education and Discussions with Family / Patient: Patient and her daughter, Sophy Vitale    Time Spent for Care: 20 minutes  More than 50% of total time spent on counseling and coordination of care as described above      Current Length of Stay: 4 day(s)    Current Patient Status: Inpatient   Certification Statement: The patient will continue to require additional inpatient hospital stay due to safe discharge planning    Discharge Plan: Anticipate to rehab tomorrow     Code Status: Level 3 - DNAR and DNI      Subjective:   Ms Arevalo Rater reports that she is feeling well today  She denies any diarrhea or vomiting, and this is concerned by her nurse  When asked where she is, the patient correctly identifies best plan  When further questioned as to what kind of building she is in, the patient says "brick!"  Patient denies fevers, chills, shortness of breath, chest pain, abdominal pain    Objective:     Vitals:   Temp (24hrs), Av 5 °F (36 9 °C), Min:98 1 °F (36 7 °C), Max:99 °F (37 2 °C)    HR:  [63-74] 74  Resp:  [18-20] 20  BP: (132-150)/(61-69) 132/61  SpO2:  [93 %-97 %] 93 %  Body mass index is 26 09 kg/m²  Input and Output Summary (last 24 hours): Intake/Output Summary (Last 24 hours) at 18 1317  Last data filed at 18 0900   Gross per 24 hour   Intake              100 ml   Output                0 ml   Net              100 ml       Physical Exam:     Physical Exam   Constitutional: No distress  Patient seen sitting upright in wheelchair  She is very pleasant and cooperative   Cardiovascular: Normal rate and regular rhythm  Pulmonary/Chest: Effort normal and breath sounds normal  No respiratory distress  She has no wheezes  She exhibits no tenderness  Abdominal: Soft  Bowel sounds are normal  There is no tenderness  Musculoskeletal: She exhibits no edema  Neurological: She is alert  Patient is oriented to [place] and states that she is in a [place] building  Skin: Skin is warm and dry  She is not diaphoretic  Psychiatric:   Non-anxious or agitated appearing   Vitals reviewed        Additional Data:     Labs:      Results from last 7 days  Lab Units 17  0542  17  1300   WBC Thousand/uL 4 51  < > 9 60   HEMOGLOBIN g/dL 10 7*  < > 13 7   HEMATOCRIT % 32 1*  < > 41 2   PLATELETS Thousands/uL 194  < > 240   LYMPHO PCT %  --   --  4*   MONO PCT MAN %  --   --  4   EOSINO PCT MANUAL %  --   --  0 < > = values in this interval not displayed  Results from last 7 days  Lab Units 12/31/17  0542  12/28/17  1300   SODIUM mmol/L 144  < > 142   POTASSIUM mmol/L 3 3*  < > 3 4*   CHLORIDE mmol/L 114*  < > 107   CO2 mmol/L 21  < > 27   BUN mg/dL 6  < > 13   CREATININE mg/dL 0 44*  < > 0 68   CALCIUM mg/dL 8 1*  < > 8 9   TOTAL PROTEIN g/dL  --   --  7 5   BILIRUBIN TOTAL mg/dL  --   --  0 94   ALK PHOS U/L  --   --  76   ALT U/L  --   --  13   AST U/L  --   --  18   GLUCOSE RANDOM mg/dL 81  < > 131   < > = values in this interval not displayed  * I Have Reviewed All Lab Data Listed Above  * Additional Pertinent Lab Tests Reviewed: All Labs Within Last 24 Hours Reviewed    Imaging:    Imaging Reports Reviewed Today Include: CT abdomen/pelvis with contrast, XR wrist  Imaging Personally Reviewed by Myself Includes:  none    Recent Cultures (last 7 days):       Results from last 7 days  Lab Units 12/30/17  1539   C DIFF TOXIN B  NEGATIVE for C difficle toxin by PCR  Last 24 Hours Medication List:     EMS replenish medication  Does not apply Once   amLODIPine 10 mg Oral Daily   aspirin 81 mg Oral Once per day on Mon Thu   cholecalciferol 1,000 Units Oral BID   enoxaparin 40 mg Subcutaneous Daily   lisinopril 40 mg Oral Daily   metoprolol tartrate 50 mg Oral BID        Today, Patient Was Seen By: Jamil Shine PA-C    ** Please Note: Dragon 360 Dictation voice to text software may have been used in the creation of this document   **

## 2018-01-02 NOTE — SIGNIFICANT EVENT
Was contacted by nursing to notify me that patient had an unwitnessed fall and sustained a "skin tear" on her left elbow  I personally saw and evaluated the patient with her nurse  The patient is seen sitting upright in her wheelchair comfortably doing a word puzzle book  She states that she got out of bed to go to the bathroom  The patient reports that she called for help but did not wait for assistance and did not want to wet the bed, so she got up to go to the bathroom on her own  She reports that she got to the bathroom and grabbed the handrail to turn around but her hand slipped and she fell  She reports that she hit her left elbow and her right wrist  She reports "burning" pain in her left elbow but otherwise denies complaint  She denies LOC  She denies hitting her head  She denies feeling dizzy/lightheaded, CP, SOB, or abdominal pain  Per nursing, the patient was found in the bathroom, but not right by the handrail  Nursing informs me that the patient's bed alarm went off and by the time they got there she was already on the floor  On exam, Ms Kendra Buckley is comfortable appearing in her wheelchair, seen with nursing staff present  She is alert and oriented to person and "[place]" only once again  I asked Ms Kendra Buckley what kind of building we are in as I did earlier, however this time instead of saying "brick" she stated "the kind of place where people come for help when they fall and get bruises of broken bones"  Her head is atraumatic appearing  Heart is regular rate and rhythm  Lungs are CTA b/l  Abdomen is soft and non-tender  Her left forearm is in a cast and just proximal to that her left elbow is wrapped in a bandage  Her shoulder shrug,  strength, hip flexion, foot flexion and extension are equal bilaterally  Discussed the importance of the call bell with the patient   We will check STAT XR of left elbow and wrist as well as right wrist  Given that this was an unwitnessed fall and her underlying dementia, will check CT head; patient has been on [place] Lovenox daily for DVT prophylaxis  Fall precautions  We will order 1:1 observation as patient is unable to follow directions to avoid injury  In light of her known fracture now with an in-hospital fall, would appreciate the consultation of the trauma team  I called the patient's daughter, Saul Fajardo, with an update and discussed the plan; Hejonathanarmen Fajardo was in agreement  Discussed with [facility] attending, Dr Deo Calderon

## 2018-01-02 NOTE — PLAN OF CARE
Problem: OCCUPATIONAL THERAPY ADULT  Goal: Performs self-care activities at highest level of function for planned discharge setting  See evaluation for individualized goals  Treatment Interventions: ADL retraining, Functional transfer training          See flowsheet documentation for full assessment, interventions and recommendations  Limitation: Decreased ADL status, Decreased UE strength, Decreased UE ROM, Decreased Safe judgement during ADL, Decreased cognition, Decreased endurance, Decreased high-level ADLs, Decreased self-care trans  Prognosis: Fair  Assessment: Pt is a 80 y o  female seen for OT evaluation s/p admit to [facility] on 12/28/2017 w/ Vomiting  OT evaluation and assessment of strength, ADL participation, and functional transfers completed  Pt unable to report PLOF at this time  Pt requires MIN A/MOD A  due to the following deficits impacting occupational performance,pt is limited by Decreaed standing balance, decreased activity tolerance, SOB/BECKMAN, decreased memory, poor recall, decreased safety  Educated Pt on safety precautions, importance of and how to use using call bell; currently using chair alarm  Educated Pt on role of Occupational Therapy in acute setting  Based on Pt extensive Occupational profile review, Pt benefits from from skilled OT services for I/ADL retraining to support the ability to safely participate in daily occupations safely and independently in the most least restrictive way  From OT standpoint  Recommend short term inpatient rehab when medically stable  Will continue to follow 3-5 x weekly to address the following below stated goals       OT Discharge Recommendation: 24 hour supervision/assist (Home with 24 hour supervision and asssist)

## 2018-01-02 NOTE — PLAN OF CARE
Problem: PHYSICAL THERAPY ADULT  Goal: Performs mobility at highest level of function for planned discharge setting  See evaluation for individualized goals  Treatment/Interventions: Functional transfer training, Therapeutic exercise, Endurance training, Bed mobility, Gait training, Spoke to nursing  Equipment Recommended: Wheelchair       See flowsheet documentation for full assessment, interventions and recommendations  Outcome: Progressing  Prognosis: Fair  Problem List: Decreased strength, Decreased range of motion, Impaired balance, Decreased mobility, Decreased cognition, Impaired judgement, Decreased safety awareness, Impaired hearing, Orthopedic restrictions  Assessment: pt denied any pain and was agreeable to have PT  although continues to demonstrate dec safety awareness and compliance with NWB precaution on L wrist/hand during functional mobilities requiring mod-max HHA on R UE to complete chair/bed transfers while mod-min during amb with constant cues to avoid grabbing furnitures with L UE along the way  although she increased amb distance to 61' but continues to be limited by reported fatigue  Pt will benefit from continued skilled PT services to work on strengthening and improving safety and indep with transfers and amb activities to dec burden of care at d/c    Barriers to Discharge: Inaccessible home environment, Decreased caregiver support  Barriers to Discharge Comments: will verify with family regarding home set up and availability to provide assistance to pt at d/c  Recommendation: Short-term skilled PT          See flowsheet documentation for full assessment

## 2018-01-02 NOTE — ASSESSMENT & PLAN NOTE
-nurses found patient on bathroom floor   Baseline mental status  -no evidence of head trauma but CT head pending given age/unwitnessed

## 2018-01-02 NOTE — OCCUPATIONAL THERAPY NOTE
Occupational Therapy Progress Note      Patient Name: Dee Dee HANNA Date: 1/2/2018    Problem List:   Patient Active Problem List   Diagnosis    Essential hypertension    Dementia, early onset    Vitamin D deficiency    Distal radius fracture, left        01/02/18 1431   Restrictions/Precautions   Weight Bearing Precautions Per Order Yes   LUE Weight Bearing Per Order NWB   ADL   Toileting Assistance  3  Moderate Assistance   Toileting Deficit Clothing management up;Clothing management down;Setup;Steadying;Verbal cueing;Supervison/safety   Toileting Comments Assit for balance during clothing management  VC's for weight bearing on L UE   Bed Mobility   Sit to Supine 4  Minimal assistance   Additional items Verbal cues; Assist x 1   Transfers   Sit to Stand 4  Minimal assistance   Additional items Verbal cues   Stand to Sit 3  Moderate assistance   Additional items Verbal cues   Stand pivot 3  Moderate assistance   Additional items Increased time required;Verbal cues; Impulsive   Additional Comments HHA, pt requires frequrnt cues for safety, sequencing  Poor safety awareness for controlled decent to surfaces  Toilet Transfers   Toilet Transfer From [employer] Transfer Type To and from   Toilet Transfer to Standard bedside commode   Toilet Transfer Technique Stand pivot   Toilet Transfers Moderate assistance   Toilet Transfers Comments assit fot controlled decent to chair  Cognition   Overall Cognitive Status Impaired   Arousal/Participation Cooperative   Attention Attends with cues to redirect   Orientation Level Oriented to person   Memory Decreased short term memory;Decreased recall of biographical information;Decreased long term memory   Following Commands Follows one step commands with increased time or repetition   Comments Pt confusing therapist with daughter      Plan   Treatment Interventions ADL retraining;Functional transfer training   Goal Expiration Date 01/08/18   Treatment Day 1 OT Frequency 3-5x/wk   Recommendation   OT Discharge Recommendation 24 hour supervision/assist  (Home with 24 hour supervision and asssist)   Barthel Index   Feeding 5   Bathing 0   Grooming Score 0   Dressing Score 5   Bladder Score 10   Bowels Score 10   Toilet Use Score 5   Transfers (Bed/Chair) Score 5   Mobility (Level Surface) Score 0   Stairs Score 0   Barthel Index Score 40

## 2018-01-02 NOTE — PROGRESS NOTES
Patient bed alarm went off, patient found on floor , climbed out of bed  Skin tear noted on L elbow, dressed  Vitals stable, no LOC  [place] LOPES  Notified and examined patient  Will send for x-ray L arm  Patient  States no pain  Patient very forgetful

## 2018-01-02 NOTE — CONSULTS
Evaluation - Trauma   Dylan Holguin 80 y o  female MRN: [id number]  Unit/Bed#: -01 Encounter: [id number]    Assessment/Plan   Trauma Alert: Evaluation  Model of Arrival: in hospital  Trauma Team: Attending Chau Sands and Residents Phillip Garcia  Consultants: None    Trauma Active Problems: Active problems reviewed and documented on the problem list       Trauma Plan:     1  Unwitnessed fall   -no evidence of head trauma but given age/dementia/unwitnessed fall agree with CT head   -f/u imaging   -1:1 recommended due to propensity for falls    2  Left distal radius fracture (POA)   -seen as outpatient and casted by [facility]  -no new evidence of trauma   -XR pending    3  Left elbow skin tear   -tetanus to date as far as I can tell   -continue daily dressings, no indication for primary closure   -XR pending    Chief Complaint:   " I am half good and half bad, but you know what? I don't remember what either are"  History of Present Illness   Physician Requesting Evaluation: Judge Elisabeth MD  Reason for Evaluation / Principal Problem: patient has unwitnessed fall in hospital  HPI:  Dylan Holguin is a 80 y o  female who presented originally on 12/28 [facility] n/v/d and now today  with a fall in the hospital  Patient got out of bed and quickly went to walk to the bathroom and by the time the nurses arrrived after hearing the bed alarm the patient was found down in the middle of the bathroom  She has no complaints although the patient appears to be AOx1 at baseline  She is very pleasant  When I examine her she does complain of left elbow and arm pain where she has a cast from a previous injury  left elbow skin tear  Otherwise she is sitting in her wheelchair doing word puzzles     Mechanism:Fall    Consults    Review of Systems   Unable to perform ROS: Dementia       Historical Information   History is unobtainable from the patient due to dementia    Efforts to obtain history included the following sources: other medical personnel    Past Medical History:   Diagnosis Date    History of shingles 2013    Hypertension     Osteoarthritis      Past Surgical History:   Procedure Laterality Date    APPENDECTOMY      CHOLECYSTECTOMY      HYSTERECTOMY      TONSILECTOMY AND ADNOIDECTOMY      VARICOSE VEIN SURGERY       Social History   History   Alcohol Use No     History   Drug Use No     History   Smoking Status    Never Smoker   Smokeless Tobacco    Never Used     Immunization History   Administered Date(s) Administered    Influenza Quadrivalent Preservative Free 3 years and older IM 10/14/2014    Influenza Split High Dose Preservative Free IM 11/10/2015, 11/14/2016, 09/18/2017    Influenza TIV (IM) 10/23/2013    Pneumococcal Conjugate 13-Valent 10/14/2014    Pneumococcal Polysaccharide PPV23 05/28/2013    Tdap 11/12/2014, 09/04/2016     Last Tetanus: within 10 years  Family History: Non-contributory  Unable to obtain/limited by dementia      Meds/Allergies   all current active meds have been reviewed    No Known Allergies      Objective   Vitals:   First set: Temperature: 98 6 °F (37 °C) (12/28/17 1234)  Pulse: 90 (12/28/17 1234)  Respirations: 18 (12/28/17 1234)  Blood Pressure: 147/69 (12/28/17 1234)    Invasive Devices          No matching active lines, drains, or airways          Neurologic Exam     Mental Status   Oriented to person  Disoriented to place  Disoriented to year, month and day  Oriented to season  Level of consciousness: alert  Unable to perform simple calculations  Able to read  Able to repeat  Cranial Nerves     CN III, IV, VI   Pupils are equal, round, and reactive to light  Physical Exam   Constitutional: She appears well-developed and well-nourished  No distress  HENT:   Head: Normocephalic  Eyes: Pupils are equal, round, and reactive to light  Right eye exhibits no discharge  No scleral icterus  Neck: No JVD present  No tracheal deviation present  No thyromegaly present  Cardiovascular: Normal rate, regular rhythm and normal heart sounds  No murmur heard  Pulmonary/Chest: Effort normal and breath sounds normal  No stridor  No respiratory distress  She has no wheezes  Abdominal: Soft  Bowel sounds are normal  She exhibits no distension  There is no tenderness  There is no rebound  Musculoskeletal: She exhibits tenderness  She exhibits no edema or deformity  Left forearm cast, RUM intact, pulses +2, cap refill less than 2  Left elbow with small skin tear not actively bleeding  Neurological: She is alert  No cranial nerve deficit  Coordination normal    Skin: Skin is warm and dry  She is not diaphoretic  No erythema  Psychiatric: She has a normal mood and affect  Her behavior is normal      PE limited by: none    Lab Results: Results: I have personally reviewed pertinent reports  Imaging/EKG Studies: Other: pending ct head, plain films  Other Studies: none    Code Status: Level 3 - DNAR and DNI  Advance Directive and Living Will: Yes    Power of :    POLST:      Counseling / Coordination of Care  Total Critical Care time spent 25 minutes excluding procedures, teaching and family updates

## 2018-01-02 NOTE — ASSESSMENT & PLAN NOTE
-no new evidence of trauma on existing left distal radius fracutre RUM nerves intact, cap refill less than 2  -Xr pending

## 2018-01-02 NOTE — PHYSICAL THERAPY NOTE
Physical Therapy Screen    Patient Name: Kimberlyn Rogel    GAKTV'Y Date: 1/2/2018     Problem List  Patient Active Problem List   Diagnosis    Essential hypertension    Dementia, early onset    Vitamin D deficiency    Distal radius fracture, left        Past Medical History  Past Medical History:   Diagnosis Date    History of shingles 2013    Hypertension     Osteoarthritis         Past Surgical History  Past Surgical History:   Procedure Laterality Date    APPENDECTOMY      CHOLECYSTECTOMY      HYSTERECTOMY      TONSILECTOMY AND ADNOIDECTOMY      VARICOSE VEIN SURGERY             01/02/18 0853   Pain Assessment   Pain Assessment No/denies pain   Restrictions/Precautions   Weight Bearing Precautions Per Order Yes   LUE Weight Bearing Per Order NWB   Braces or Orthoses Splint   Other Precautions Cognitive; Chair Alarm; Bed Alarm; Fall Risk   General   Chart Reviewed Yes   Response to Previous Treatment Patient with no complaints from previous session  Family/Caregiver Present No   Cognition   Overall Cognitive Status Impaired   Arousal/Participation Alert; Cooperative   Attention Attends with cues to redirect   Orientation Level Oriented to person   Memory Decreased long term memory;Decreased recall of biographical information;Decreased short term memory;Decreased recall of recent events;Decreased recall of precautions   Following Commands Follows one step commands with increased time or repetition   Subjective   Subjective pt had no complaints and was agreeable to have PT this morning  Bed Mobility   Rolling R 4  Minimal assistance   Additional items (CG to maintain NWB on L UE)   Supine to Sit 4  Minimal assistance  (CG on L UE to maintain NWB precaution - bed flat )   Additional items Verbal cues; Bedrails  (bed flat)   Sit to Supine 4  Minimal assistance  (CG to maintain NWB on L UE)   Additional items Verbal cues   Transfers   Sit to Stand 2 Maximal assistance  (HHA mod-max of 1 with max verbal/tactile cues )   Stand to Sit 3  Moderate assistance  (mod-min , flopped down on last transfer despite cues)   Stand pivot 3  Moderate assistance  (min-mod HHA on R UE )   Ambulation/Elevation   Gait pattern Improper Weight shift; Foward flexed; Inconsistent vivian; Forward Flexion   Gait Assistance 3  Moderate assist  (mod-min HHA of 1 on R UE)   Additional items (cues to avoid using L UE)   Assistive Device (HHA)   Distance 60  (x2)   Balance   Static Sitting Fair   Dynamic Sitting Fair -   Static Standing Fair -   Dynamic Standing Poor   Ambulatory Poor   Activity Tolerance   Activity Tolerance Patient limited by fatigue   Assessment   Prognosis Fair   Problem List Decreased strength;Decreased range of motion; Impaired balance;Decreased mobility; Decreased cognition; Impaired judgement;Decreased safety awareness; Impaired hearing;Orthopedic restrictions   Assessment pt denied any pain and was agreeable to have PT  although continues to demonstrate dec safety awareness and compliance with NWB precaution on L wrist/hand during functional mobilities requiring mod-max HHA on R UE to complete chair/bed transfers while mod-min during amb with constant cues to avoid grabbing furnitures with L UE along the way  although she increased amb distance to 61' but continues to be limited by reported fatigue  Pt will benefit from continued skilled PT services to work on strengthening and improving safety and indep with transfers and amb activities to dec burden of care at d/c     Barriers to Discharge Inaccessible home environment;Decreased caregiver support   Plan   Treatment/Interventions Functional transfer training; Therapeutic exercise; Endurance training;Bed mobility;Gait training;Spoke to nursing   Progress Slow progress, cognitive deficits   Recommendation   Recommendation Short-term skilled PT   Equipment Recommended Wheelchair

## 2018-01-03 PROBLEM — E87.6 HYPOKALEMIA: Status: ACTIVE | Noted: 2018-01-03

## 2018-01-03 LAB
ANION GAP SERPL CALCULATED.3IONS-SCNC: 9 MMOL/L (ref 4–13)
BUN SERPL-MCNC: 8 MG/DL (ref 5–25)
CALCIUM SERPL-MCNC: 8.2 MG/DL (ref 8.3–10.1)
CHLORIDE SERPL-SCNC: 107 MMOL/L (ref 100–108)
CO2 SERPL-SCNC: 25 MMOL/L (ref 21–32)
CREAT SERPL-MCNC: 0.64 MG/DL (ref 0.6–1.3)
ERYTHROCYTE [DISTWIDTH] IN BLOOD BY AUTOMATED COUNT: 13.3 % (ref 11.6–15.1)
GFR SERPL CREATININE-BSD FRML MDRD: 75 ML/MIN/1.73SQ M
GLUCOSE SERPL-MCNC: 90 MG/DL (ref 65–140)
HCT VFR BLD AUTO: 32.6 % (ref 34.8–46.1)
HGB BLD-MCNC: 10.9 G/DL (ref 11.5–15.4)
MCH RBC QN AUTO: 32.5 PG (ref 26.8–34.3)
MCHC RBC AUTO-ENTMCNC: 33.4 G/DL (ref 31.4–37.4)
MCV RBC AUTO: 97 FL (ref 82–98)
PLATELET # BLD AUTO: 221 THOUSANDS/UL (ref 149–390)
PMV BLD AUTO: 10.1 FL (ref 8.9–12.7)
POTASSIUM SERPL-SCNC: 2.8 MMOL/L (ref 3.5–5.3)
RBC # BLD AUTO: 3.35 MILLION/UL (ref 3.81–5.12)
SODIUM SERPL-SCNC: 141 MMOL/L (ref 136–145)
WBC # BLD AUTO: 4.71 THOUSAND/UL (ref 4.31–10.16)

## 2018-01-03 PROCEDURE — 97530 THERAPEUTIC ACTIVITIES: CPT

## 2018-01-03 PROCEDURE — 85027 COMPLETE CBC AUTOMATED: CPT | Performed by: PHYSICIAN ASSISTANT

## 2018-01-03 PROCEDURE — G0515 COGNITIVE SKILLS DEVELOPMENT: HCPCS

## 2018-01-03 PROCEDURE — 80048 BASIC METABOLIC PNL TOTAL CA: CPT | Performed by: PHYSICIAN ASSISTANT

## 2018-01-03 PROCEDURE — 97116 GAIT TRAINING THERAPY: CPT

## 2018-01-03 RX ORDER — POTASSIUM CHLORIDE 20 MEQ/1
40 TABLET, EXTENDED RELEASE ORAL 2 TIMES DAILY
Status: COMPLETED | OUTPATIENT
Start: 2018-01-03 | End: 2018-01-03

## 2018-01-03 RX ADMIN — LISINOPRIL 40 MG: 20 TABLET ORAL at 08:10

## 2018-01-03 RX ADMIN — VITAMIN D, TAB 1000IU (100/BT) 1000 UNITS: 25 TAB at 17:18

## 2018-01-03 RX ADMIN — METOPROLOL TARTRATE 50 MG: 50 TABLET ORAL at 08:10

## 2018-01-03 RX ADMIN — VITAMIN D, TAB 1000IU (100/BT) 1000 UNITS: 25 TAB at 08:10

## 2018-01-03 RX ADMIN — POTASSIUM CHLORIDE 40 MEQ: 1500 TABLET, EXTENDED RELEASE ORAL at 08:10

## 2018-01-03 RX ADMIN — POTASSIUM CHLORIDE 40 MEQ: 1500 TABLET, EXTENDED RELEASE ORAL at 17:18

## 2018-01-03 RX ADMIN — METOPROLOL TARTRATE 50 MG: 50 TABLET ORAL at 17:18

## 2018-01-03 RX ADMIN — AMLODIPINE BESYLATE 10 MG: 10 TABLET ORAL at 08:10

## 2018-01-03 RX ADMIN — ACETAMINOPHEN 650 MG: 325 TABLET, FILM COATED ORAL at 18:38

## 2018-01-03 RX ADMIN — ENOXAPARIN SODIUM 40 MG: 40 INJECTION SUBCUTANEOUS at 08:11

## 2018-01-03 NOTE — OCCUPATIONAL THERAPY NOTE
Occupational Therapy Progress Note      Patient Name: Kimberlyn Rogel    ZZPSX'O Date: 1/3/2018    Problem List:   Patient Active Problem List   Diagnosis    Vomiting    Diarrhea    Essential hypertension    Dementia, early onset    Vitamin D deficiency    Distal radius fracture, left    Unwitnessed fall    Skin tear of left elbow without complication    Hypokalemia        01/03/18 1330   Restrictions/Precautions   Weight Bearing Precautions Per Order Yes   LUE Weight Bearing Per Order NWB   Braces or Orthoses Splint   Other Precautions Fall Risk;Cognitive; Bed Alarm; Chair Alarm;Hard of hearing;WBS  (chair alarm activated at end of tx session)   Pain Assessment   Pain Assessment No/denies pain   Pain Score No Pain   Functional Standing Tolerance   Time <1 min x 3   Activity static stance with b/l UE  support on OT   Comments Pt engaged in static standing tolerance task to inc pt's stability in stance and improve LE strength/balance  pt tolerated <1 minx3 with inc fear noted  pt stating "put me down Im going to fall, im going to fall!" Pt provided with verbal support and reinforcement to continue  Pt req A to lower to seat   Transfers   Sit to Stand 3  Moderate assistance   Stand to Sit 3  Moderate assistance   Stand pivot 2  Maximal assistance   Additional Comments Max A SPT; pt with poor adherence to WBS of L UE   Cognition   Overall Cognitive Status Impaired   Arousal/Participation Cooperative   Attention Attends with cues to redirect   Orientation Level Oriented to person   Memory Decreased recall of precautions;Decreased recall of recent events;Decreased short term memory;Decreased long term memory;Decreased recall of biographical information   Following Commands Follows one step commands with increased time or repetition   Comments Pt scoring an overall 6/30 on moca indicating severe cognitive deficits   Pt presenting with deficits in areas of executive fxn, naming, attention, language, delayed recall, and orinetation  OT to continue to recommend 24/7 S and A for all IADL fxn  Cognition Assessment Tools MOCA  (7 1)   Score 6   Plan   Treatment Interventions ADL retraining;Functional transfer training; Endurance training;Patient/family training;Equipment evaluation/education; Compensatory technique education; Energy conservation   Treatment Day 2   OT Frequency 3-5x/wk   Recommendation   OT Discharge Recommendation 24 hour supervision/assist  (vs short term rehab)   Barthel Index   Feeding 5   Bathing 0   Grooming Score 0   Dressing Score 5   Bladder Score 10   Bowels Score 10   Toilet Use Score 5   Transfers (Bed/Chair) Score 5   Mobility (Level Surface) Score 0   Stairs Score 0   Barthel Index Score 40   Modified Mat Scale   Modified [place] Scale 4

## 2018-01-03 NOTE — PLAN OF CARE
Problem: PHYSICAL THERAPY ADULT  Goal: Performs mobility at highest level of function for planned discharge setting  See evaluation for individualized goals  Treatment/Interventions: Functional transfer training, Therapeutic exercise, Gait training, Endurance training  Equipment Recommended: Wheelchair       See flowsheet documentation for full assessment, interventions and recommendations  Outcome: Progressing  Prognosis: Fair  Problem List: Decreased strength, Decreased endurance, Impaired balance, Decreased mobility, Decreased cognition, Impaired judgement, Decreased safety awareness, Impaired hearing, Orthopedic restrictions  Assessment: Pt participated with therapy although tolerance limited by reported fatigue  trialed gait training with HW requiring mod A with max cues for sequencing but limited by fatigue  Pt continues to demonstrate dec memory and safety awareness requiring constant cueing to maintain NWB precaution on L UE during functional mobilities  also no carry over noted on proper techniques and safety precautions despite repeated education  Hence, PT will recommend for pt to have 24/7 S/assistance for safety and reduce risk for falls at d/c    Barriers to Discharge: [address] home environment, Decreased caregiver support  Barriers to Discharge Comments: will verify with family regarding home set up and availability to provide assistance to pt at d/c  Recommendation: Short-term skilled PT, 24 hour supervision/assist          See flowsheet documentation for full assessment

## 2018-01-03 NOTE — ASSESSMENT & PLAN NOTE
· Potassium today 2 8  She is to receive 40mg twice daily today  Repeat BMP in AM  Patient reports improving appetite and nursing confirms increased PO intake today

## 2018-01-03 NOTE — ASSESSMENT & PLAN NOTE
· And ulna styloid fracture  Patient is known to orthopaedist, Dr Inna Campuzano, of [facility]  · XR last evening s/p fall with results report reading stable alignment, no new fracture  · I spoke with Shady Power PA-C of Dr [facility] office today   Per Allie Dey, the cast is to stay in place for now and the patient is to follow up with Dr Inna Campuzano at discharge

## 2018-01-03 NOTE — PHYSICAL THERAPY NOTE
Physical Therapy Treat    Patient Name: Giovanny Putnam    DNQTG'X Date: 1/3/2018     Problem List  Patient Active Problem List   Diagnosis    Essential hypertension    Dementia, early onset    Vitamin D deficiency    Distal radius fracture, left    Unwitnessed fall    Skin tear of left elbow without complication        Past Medical History  Past Medical History:   Diagnosis Date    History of shingles 2013    Hypertension     Osteoarthritis         Past Surgical History  Past Surgical History:   Procedure Laterality Date    APPENDECTOMY      CHOLECYSTECTOMY      HYSTERECTOMY      TONSILECTOMY AND ADNOIDECTOMY      VARICOSE VEIN SURGERY             01/03/18 0845   Pain Assessment   Pain Assessment No/denies pain   Restrictions/Precautions   Weight Bearing Precautions Per Order Yes   LUE Weight Bearing Per Order NWB   Braces or Orthoses Splint  (cast L wrist/forearm)   Other Precautions Cognitive; Chair Alarm; Bed Alarm; Fall Risk;Hard of hearing;WBS   General   Chart Reviewed Yes   Response to Previous Treatment Patient with no complaints from previous session  Family/Caregiver Present No   Cognition   Overall Cognitive Status Impaired   Arousal/Participation Alert; Cooperative   Attention Attends with cues to redirect   Orientation Level Oriented to person   Memory Decreased long term memory;Decreased recall of biographical information;Decreased short term memory;Decreased recall of recent events;Decreased recall of precautions   Following Commands Follows one step commands with increased time or repetition   Subjective   Subjective pt denied any pain or discomfort and was agreeable to have PT   Transfers   Sit to Stand 3  Moderate assistance   Additional items Increased time required;Verbal cues   Stand to Sit 3  Moderate assistance   Additional items Increased time required;Verbal cues   Stand pivot 3  Moderate assistance  (min-mod HHA on R UE) Additional items Increased time required;Verbal cues   Toilet transfer 4  Minimal assistance   Additional items Raised toilet seat; Increased time required;Verbal cues  (grab bar)   Ambulation/Elevation   Gait pattern Improper Weight shift;Decreased foot clearance; Inconsistent vivian; Short stride; Forward Flexion; Foward flexed   Gait Assistance 3  Moderate assist   Additional items Verbal cues   Assistive Device (HHA initially then trialed HW)   Distance 60  (HHA on R UE, 25' with HHA limited by fatigue)   Balance   Static Sitting Fair   Dynamic Sitting Fair -   Static Standing Fair -   Dynamic Standing Poor   Ambulatory Poor   Activity Tolerance   Activity Tolerance Patient limited by fatigue   Assessment   Prognosis Fair   Problem List Decreased strength;Decreased endurance; Impaired balance;Decreased mobility; Decreased cognition; Impaired judgement;Decreased safety awareness; Impaired hearing;Orthopedic restrictions   Assessment Pt participated with therapy although tolerance limited by reported fatigue  trialed gait training with HW requiring mod A with max cues for sequencing but limited by fatigue  Pt continues to demonstrate dec memory and safety awareness requiring constant cueing to maintain NWB precaution on L UE during functional mobilities  also no carry over noted on proper techniques and safety precautions despite repeated education  Hence, PT will recommend for pt to have 24/7 S/assistance for safety and reduce risk for falls at d/c     Barriers to Discharge Inaccessible home environment;Decreased caregiver support   Plan   Treatment/Interventions Functional transfer training; Therapeutic exercise;Gait training; Endurance training   Progress Slow progress, cognitive deficits   Recommendation   Recommendation Short-term skilled PT;24 hour supervision/assist   Equipment Recommended Wheelchair

## 2018-01-03 NOTE — ASSESSMENT & PLAN NOTE
· Resolved   Confirmed with nursing and patient's 1:1 she has had BMs that were not diarrheal   · Suspect viral gastroenteritis  · C diff and stool enteric bacterial panel by PCR all negative

## 2018-01-03 NOTE — SOCIAL WORK
CM reviewed Pt in care coordination rounds, Pt still requiring 1:1 continual observation  Pt will D/C to [facility] when medically stable  CM provided update to facility

## 2018-01-03 NOTE — PLAN OF CARE
Problem: OCCUPATIONAL THERAPY ADULT  Goal: Performs self-care activities at highest level of function for planned discharge setting  See evaluation for individualized goals  Treatment Interventions: ADL retraining, Functional transfer training          See flowsheet documentation for full assessment, interventions and recommendations  Outcome: Progressing  Limitation: Decreased ADL status, Decreased UE strength, Decreased UE ROM, Decreased Safe judgement during ADL, Decreased cognition, Decreased endurance, Decreased high-level ADLs, Decreased self-care trans  Prognosis: Fair  Assessment: Pt is a 80 y o  female seen for OT evaluation s/p admit to [facility] on 12/28/2017 w/ Vomiting  OT evaluation and assessment of strength, ADL participation, and functional transfers completed  Pt unable to report PLOF at this time  Pt requires MIN A/MOD A  due to the following deficits impacting occupational performance,pt is limited by Decreaed standing balance, decreased activity tolerance, SOB/BECKMAN, decreased memory, poor recall, decreased safety  Educated Pt on safety precautions, importance of and how to use using call bell; currently using chair alarm  Educated Pt on role of Occupational Therapy in acute setting  Based on Pt extensive Occupational profile review, Pt benefits from from skilled OT services for I/ADL retraining to support the ability to safely participate in daily occupations safely and independently in the most least restrictive way  From OT standpoint  Recommend short term inpatient rehab when medically stable  Will continue to follow 3-5 x weekly to address the following below stated goals       OT Discharge Recommendation: 24 hour supervision/assist (vs short term rehab)

## 2018-01-03 NOTE — ASSESSMENT & PLAN NOTE
· BP stable, most recently 143/71  · Continue current antihypertensive regimen which is as follows:  · Lisinopril 40 mg daily, please note that this is an increase from her home dose of 20 mg daily prior to admission  · Amlodipine 10 mg daily, please note this is a new medication since admission  · Metoprolol tartrate 50 mg b i d

## 2018-01-03 NOTE — PROGRESS NOTES
Progress Note - Lana Orellana 3/23/1920, 80 y o  female MRN: [id number]    Unit/Bed#: -01 Encounter: [id number]    Primary Care Provider: Jonh Livingston MD   Date and time admitted to hospital: 12/28/2017 12:24 PM        * Diarrhea   Assessment & Plan    · Resolved  Confirmed with nursing and patient's 1:1 she has had BMs that were not diarrheal   · Suspect viral gastroenteritis  · C diff and stool enteric bacterial panel by PCR all negative        Vomiting   Assessment & Plan    · Resolved  Confirmed with nursing and her 1:1 that she has not vomited  · Suspect viral gastroenteritis        Essential hypertension   Assessment & Plan    · BP stable, most recently 143/71  · Continue current antihypertensive regimen which is as follows:  · Lisinopril 40 mg daily, please note that this is an increase from her home dose of 20 mg daily prior to admission  · Amlodipine 10 mg daily, please note this is a new medication since admission  · Metoprolol tartrate 50 mg b i d  Unwitnessed fall   Assessment & Plan    · Patient sustained an unwitnessed fall in the bathroom yesterday afternoon, please see my significant event report at the time of the event as well as subsequent trauma consultation note  Their recommendations are appreciated  · CT head was negative for acute intracranial abnormality and x-rays of the upper extremities were negative        Dementia, early onset   Assessment & Plan    · Mental status stable today from yesterday, which per my discussion with the patient's daughter appears to be baseline  · CT head negative for acute intracranial abnormality  · PT recommending rehab - patient's daughter is agreeable  · Supportive care  Monitor for signs of delirium  Consider geriatrics consult         Skin tear of left elbow without complication   Assessment & Plan    · Secondary to unwitnessed fall, trauma following    No need for primary closure per trauma        Distal radius fracture, left Assessment & Plan    · And ulna styloid fracture  Patient is known to orthopaedist, Dr Rosemary De León, of [facility]  · XR last evening s/p fall with results report reading stable alignment, no new fracture  · I spoke with Lizeth Stevens PA-C of Dr [facility] [place] office today  Per Price White, the cast is to stay in place for now and the patient is to follow up with Dr Rosemary De León at discharge        Hypokalemia   Assessment & Plan    · Potassium today 2 8  She is to receive 40mg twice daily today  Repeat BMP in AM  Patient reports improving appetite and nursing confirms increased PO intake today  VTE Pharmacologic Prophylaxis:   Pharmacologic: Enoxaparin (Lovenox)  Mechanical VTE Prophylaxis in Place: Yes    Patient Centered Rounds: I have performed bedside rounds with nursing staff today  Discussions with Specialists or Other Care Team Provider: Nursing, case management, and Lizeth Stevens PA-C of [facility] orthopaedics     Education and Discussions with Family / Patient: Patient as well as her daughter, Raza tracy, over the phone    Time Spent for Care: 30 minutes  More than 50% of total time spent on counseling and coordination of care as described above  Current Length of Stay: 5 day(s)    Current Patient Status: Inpatient   Certification Statement: The patient will continue to require additional inpatient hospital stay due to continues to require 1:1 observation for safety  Potassium repleion and BMP monitoring    Discharge Plan: Anticipate in the next 48 hours if able to maintain safety off 1:1 to rehab    Code Status: Level 3 - DNAR and DNI      Subjective:   Ms Roni Swenson reports that she is feeling well today  She denies vomiting or diarrhea which both her nurse and 1-1 confirm  She reports increased appetite and from yesterday  She denies chest pain, shortness of breath, abdominal pain   She denies any pain about her left elbow, or wrist     Objective:     Vitals:   Temp (24hrs), Av 4 °F (36 9 °C), Min:97 9 °F (36 6 °C), Max:99 5 °F (37 5 °C)    HR:  [71-90] 90  Resp:  [18-20] 20  BP: (133-151)/(61-71) 143/71  SpO2:  [92 %-98 %] 95 %  Body mass index is 26 09 kg/m²  Input and Output Summary (last 24 hours): Intake/Output Summary (Last 24 hours) at 01/03/18 1110  Last data filed at 01/03/18 0900   Gross per 24 hour   Intake              420 ml   Output                4 ml   Net              416 ml       Physical Exam:     Physical Exam   Constitutional: No distress  Patient seen sitting upright comfortably in bedside chair with 1-1 present  She is watching TV  Very pleasant and cooperative   Cardiovascular: Normal rate and regular rhythm  Pulmonary/Chest: Effort normal and breath sounds normal  No respiratory distress  She has no wheezes  She exhibits no tenderness  Abdominal: Soft  Bowel sounds are normal  There is no tenderness  Musculoskeletal: She exhibits no edema  Left wrist with cast in place   Neurological: She is alert  Oriented to self only  When asked what kind of building is, again today the patient states brick  She is unaware of the current year  Mental status appears stable from yesterday's exam    Skin: She is not diaphoretic  Left elbow with clean dry intact dressing about the area where her skin tear is  Psychiatric:   Not anxious or agitated appearing   Vitals reviewed  Additional Data:     Labs:      Results from last 7 days  Lab Units 01/03/18  0534  12/28/17  1300   WBC Thousand/uL 4 71  < > 9 60   HEMOGLOBIN g/dL 10 9*  < > 13 7   HEMATOCRIT % 32 6*  < > 41 2   PLATELETS Thousands/uL 221  < > 240   LYMPHO PCT %  --   --  4*   MONO PCT MAN %  --   --  4   EOSINO PCT MANUAL %  --   --  0   < > = values in this interval not displayed      Results from last 7 days  Lab Units 01/03/18  0534  12/28/17  1300   SODIUM mmol/L 141  < > 142   POTASSIUM mmol/L 2 8*  < > 3 4*   CHLORIDE mmol/L 107  < > 107   CO2 mmol/L 25  < > 27   BUN mg/dL 8  < > 13 CREATININE mg/dL 0 64  < > 0 68   CALCIUM mg/dL 8 2*  < > 8 9   TOTAL PROTEIN g/dL  --   --  7 5   BILIRUBIN TOTAL mg/dL  --   --  0 94   ALK PHOS U/L  --   --  76   ALT U/L  --   --  13   AST U/L  --   --  18   GLUCOSE RANDOM mg/dL 90  < > 131   < > = values in this interval not displayed  * I Have Reviewed All Lab Data Listed Above  * Additional Pertinent Lab Tests Reviewed: All Labs Within Last 24 Hours Reviewed    Imaging:    Imaging Reports Reviewed Today Include:  Right wrist x-ray, left elbow x-ray, left wrist x-ray, CT head  Imaging Personally Reviewed by Myself Includes:  None    Recent Cultures (last 7 days):       Results from last 7 days  Lab Units 12/30/17  1539   C DIFF TOXIN B  NEGATIVE for C difficle toxin by PCR  Last 24 Hours Medication List:     EMS replenish medication  Does not apply Once   amLODIPine 10 mg Oral Daily   aspirin 81 mg Oral Once per day on Mon Thu   cholecalciferol 1,000 Units Oral BID   enoxaparin 40 mg Subcutaneous Daily   lisinopril 40 mg Oral Daily   metoprolol tartrate 50 mg Oral BID   potassium chloride 40 mEq Oral BID        Today, Patient Was Seen By: Ellyn Fox PA-C    ** Please Note: Dictation voice to text software may have been used in the creation of this document   **

## 2018-01-03 NOTE — ASSESSMENT & PLAN NOTE
· Mental status stable today from yesterday, which per my discussion with the patient's daughter appears to be baseline  · CT head negative for acute intracranial abnormality  · PT recommending rehab - patient's daughter is agreeable  · Supportive care  Monitor for signs of delirium   Consider geriatrics consult

## 2018-01-03 NOTE — ASSESSMENT & PLAN NOTE
· Patient sustained an unwitnessed fall in the bathroom yesterday afternoon, please see my significant event report at the time of the event as well as subsequent trauma consultation note    Their recommendations are appreciated  · CT head was negative for acute intracranial abnormality and x-rays of the upper extremities were negative

## 2018-01-03 NOTE — CASE MANAGEMENT
Continued Stay Review    Date: 1/2    Vital Signs: /70   Pulse 77   Temp 97 9 °F (36 6 °C) (Oral)   Resp 20   Ht 5' 1" (1 549 m)   Wt 68 9 kg (152 lb)   SpO2 96%   BMI 26 09 kg/m²     Medications:   Scheduled Meds:   EMS replenish medication  Does not apply Once   amLODIPine 10 mg Oral Daily   aspirin 81 mg Oral Once per day on Mon Thu   cholecalciferol 1,000 Units Oral BID   enoxaparin 40 mg Subcutaneous Daily   lisinopril 40 mg Oral Daily   metoprolol tartrate 50 mg Oral BID     Continuous Infusions:    PRN Meds: acetaminophen    ondansetron    Abnormal Labs/Diagnostic Results:    Potassium 3 3 (L) 3 5 - 5 3 mmol/L       Age/Sex: 80 y o  female     Assessment/Plan:   Assessment:     Active Problems:    Essential hypertension    Dementia, early onset    Vitamin D deficiency    Distal radius fracture, left        Plan:     · Diarrhea  ? Has resolved  ? Suspect viral gastroenteritis  Stool enteric bacterial panel by PCR and C  Diff by PCR all negative  · Vomiting  ? Resolved  ? Suspect secondary to viral gastroenteritis  ? Diet advanced from clears to regular   · Hypertension  ? BP stable; most recently 132/61  ? Continue current regimen which includes:  ? Lisinopril 40 (of note, this is increased from her home dose of 20mg) daily  ? Metoprolol tartrate 50mg BID  ? Amlodipine 10mg daily (this is new as of 12/30/17)  · Left distal radial fracture and ulnar styloid fracture  ? Patient has a cast on  She is to follow up with her orthopaedist at [place] [id number]  upon discharge  · Dementia   ? Discussed with the patient's daughter today over the phone and again at bedside who informed me that patient's mental status is at baseline  ?  PT recommending rehab - daughter is agreeable         VTE Pharmacologic Prophylaxis:   Pharmacologic: Enoxaparin (Lovenox)  Mechanical VTE Prophylaxis in Place: Yes    Current Patient Status: Inpatient   Certification Statement: The patient will continue to require additional inpatient hospital stay due to safe discharge planning     Discharge Plan: Anticipate to rehab tomorrow

## 2018-01-03 NOTE — ASSESSMENT & PLAN NOTE
· Resolved   Confirmed with nursing and her 1:1 that she has not vomited  · Suspect viral gastroenteritis

## 2018-01-04 LAB
ANION GAP SERPL CALCULATED.3IONS-SCNC: 8 MMOL/L (ref 4–13)
BUN SERPL-MCNC: 7 MG/DL (ref 5–25)
CALCIUM SERPL-MCNC: 8.5 MG/DL (ref 8.3–10.1)
CHLORIDE SERPL-SCNC: 108 MMOL/L (ref 100–108)
CO2 SERPL-SCNC: 26 MMOL/L (ref 21–32)
CREAT SERPL-MCNC: 0.54 MG/DL (ref 0.6–1.3)
GFR SERPL CREATININE-BSD FRML MDRD: 79 ML/MIN/1.73SQ M
GLUCOSE SERPL-MCNC: 89 MG/DL (ref 65–140)
POTASSIUM SERPL-SCNC: 3.4 MMOL/L (ref 3.5–5.3)
SODIUM SERPL-SCNC: 142 MMOL/L (ref 136–145)

## 2018-01-04 PROCEDURE — 80048 BASIC METABOLIC PNL TOTAL CA: CPT | Performed by: PHYSICIAN ASSISTANT

## 2018-01-04 PROCEDURE — 97116 GAIT TRAINING THERAPY: CPT | Performed by: PHYSICAL THERAPIST

## 2018-01-04 PROCEDURE — 97530 THERAPEUTIC ACTIVITIES: CPT | Performed by: PHYSICAL THERAPIST

## 2018-01-04 PROCEDURE — 97530 THERAPEUTIC ACTIVITIES: CPT

## 2018-01-04 PROCEDURE — 97535 SELF CARE MNGMENT TRAINING: CPT

## 2018-01-04 RX ORDER — POTASSIUM CHLORIDE 20 MEQ/1
40 TABLET, EXTENDED RELEASE ORAL ONCE
Status: COMPLETED | OUTPATIENT
Start: 2018-01-04 | End: 2018-01-04

## 2018-01-04 RX ORDER — POLYETHYLENE GLYCOL 3350 17 G/17G
17 POWDER, FOR SOLUTION ORAL ONCE
Status: COMPLETED | OUTPATIENT
Start: 2018-01-04 | End: 2018-01-04

## 2018-01-04 RX ORDER — POLYETHYLENE GLYCOL 3350 17 G/17G
17 POWDER, FOR SOLUTION ORAL DAILY
Status: DISCONTINUED | OUTPATIENT
Start: 2018-01-04 | End: 2018-01-04

## 2018-01-04 RX ADMIN — ENOXAPARIN SODIUM 40 MG: 40 INJECTION SUBCUTANEOUS at 10:07

## 2018-01-04 RX ADMIN — METOPROLOL TARTRATE 50 MG: 50 TABLET ORAL at 10:07

## 2018-01-04 RX ADMIN — METOPROLOL TARTRATE 50 MG: 50 TABLET ORAL at 17:58

## 2018-01-04 RX ADMIN — POTASSIUM CHLORIDE 40 MEQ: 1500 TABLET, EXTENDED RELEASE ORAL at 15:11

## 2018-01-04 RX ADMIN — VITAMIN D, TAB 1000IU (100/BT) 1000 UNITS: 25 TAB at 17:58

## 2018-01-04 RX ADMIN — ASPIRIN 81 MG 81 MG: 81 TABLET ORAL at 10:07

## 2018-01-04 RX ADMIN — POLYETHYLENE GLYCOL 3350 17 G: 17 POWDER, FOR SOLUTION ORAL at 15:23

## 2018-01-04 RX ADMIN — AMLODIPINE BESYLATE 10 MG: 10 TABLET ORAL at 10:08

## 2018-01-04 RX ADMIN — LISINOPRIL 40 MG: 20 TABLET ORAL at 10:08

## 2018-01-04 RX ADMIN — VITAMIN D, TAB 1000IU (100/BT) 1000 UNITS: 25 TAB at 10:07

## 2018-01-04 NOTE — ASSESSMENT & PLAN NOTE
· Patient sustained an unwitnessed fall in the bathroom  Day prior to admission please see JEANNE Hernandez significant event report at the time of the event as well as subsequent trauma consultation note      · Their recommendations are appreciated  · CT head was negative for acute intracranial abnormality and x-rays of the upper extremities were negative

## 2018-01-04 NOTE — ASSESSMENT & PLAN NOTE
· Resolved     · Reported per nursing   · Suspect viral gastroenteritis  · Off ivf  Pt has not complaints

## 2018-01-04 NOTE — ASSESSMENT & PLAN NOTE
· BP stable, most recently 130's and stable   · Continue current antihypertensive regimen which is as follows:  · Lisinopril 40 mg daily, please note that this is an increase from her home dose of 20 mg daily prior to admission    · Amlodipine 10 mg daily, please note this is a new medication since admission  · Metoprolol tartrate 50 mg b i d   · No change at this time

## 2018-01-04 NOTE — OCCUPATIONAL THERAPY NOTE
OccupationalTherapy Progress Note     Patient Name: Giovanny Putnam  UITCU'C Date: 1/4/2018  Problem List  Patient Active Problem List   Diagnosis    Vomiting    Diarrhea    Essential hypertension    Dementia, early onset    Vitamin D deficiency    Distal radius fracture, left    Unwitnessed fall    Skin tear of left elbow without complication    Hypokalemia        01/04/18 1438   Restrictions/Precautions   Weight Bearing Precautions Per Order Yes   LUE Weight Bearing Per Order NWB   Braces or Orthoses Splint  (l ue)   Other Precautions Cognitive; Chair Alarm; Bed Alarm; Fall Risk;WBS  (BED ALARM ACTIVATED AT END OF TX)   Lifestyle   Autonomy " I need to go get my glasses changed"   Pain Assessment   Pain Assessment No/denies pain   ADL   LB Dressing Assistance 1  Total Assistance   LB Dressing Deficit Thread RLE into underwear; Thread LLE into underwear;Pull up over hips   LB Dressing Comments Pt attempting to thread LEs into underwear, inc time with limited success noted, pt req A to thread b/l LEs and A x 2 to don over hips and Max/mod A to maintain position in stance   Toileting Assistance  1  Total Assistance   Toileting Deficit Clothing management up;Clothing management down;Perineal hygiene; Bedside commode   Toileting Comments Pt req A x 2 for clothing management with poor standing tolerance/balance noted  Health Management   Health Management LH reacher item retrieval: Pt edu on use of LH reacher to retrieve items from w/c level  Pt req inc time to comprehend fxnl use of reacher, however with inc time pt demo greater understanding and ability to carryover  Pt utilziing reacher to retrieve bean bags from floor level and toss to target to inc pt's safety and fxnl endurance from seated level      Bed Mobility   Additional Comments Continued cues to maintain WBS with poor adherence noted   Cognition   Overall Cognitive Status Impaired   Arousal/Participation Cooperative   Attention Attends with cues to redirect   Orientation Level Oriented to person   Memory Decreased recall of precautions;Decreased recall of recent events;Decreased short term memory;Decreased recall of biographical information;Decreased long term memory   Following Commands Follows one step commands with increased time or repetition   Assessment   Assessment Pt may benefit from w/c level d/c plan 2* poor standing tolerance/balance, strength deficits, and endurance limiations  PT notified    Plan   Treatment Interventions ADL retraining;Functional transfer training;UE strengthening/ROM; Endurance training;Cognitive reorientation;Patient/family training;Equipment evaluation/education; Compensatory technique education; Energy conservation; Activityengagement   Treatment Day 3   OT Frequency 3-5x/wk   Recommendation   OT Discharge Recommendation Short Term Rehab   Barthel Index   Feeding 5   Bathing 0   Grooming Score 0   Dressing Score 5   Bladder Score 10   Bowels Score 10   Toilet Use Score 0   Transfers (Bed/Chair) Score 5   Mobility (Level Surface) Score 0   Stairs Score 0   Barthel Index Score 35   Modified [place] Scale   Modified Mat Scale 4       [facility], OT

## 2018-01-04 NOTE — ASSESSMENT & PLAN NOTE
· And ulna styloid fracture      · Patient is known to ortho, Dr Jorge Luis Molina, of [facility] (pt does report cast should be removed has been on a long tme)   · XR last evening s/p fall with results report reading stable alignment, no new fracture  ·  Danita Delgado PA-C of Dr Blanca Concepcion office yesterday discussed with JEANNE Elkins, Per Madina Buenrostro, the cast is to stay in place for now and the patient is to follow up with Dr Jorge Luis Molina at discharge

## 2018-01-04 NOTE — PROGRESS NOTES
Progress Note - Sofi Smith 3/23/1920, 80 y o  female MRN: [id number]    Unit/Bed#: -01 Encounter: [id number]    Primary Care Provider: Nadja Bains MD   Date and time admitted to hospital: 12/28/2017 12:24 PM        Vomiting   Assessment & Plan    · Resolved  · Reported per nursing   · Suspect viral gastroenteritis  · Off ivf  Pt has not complaints         * Diarrhea   Assessment & Plan    · Resolved  · Pt with no diarrhea and now with no bm will add one time dose of miralax now and monitor   · Possibly viral gastroenteritis   · C diff and stool enteric bacterial panel by PCR all negative          Essential hypertension   Assessment & Plan    · BP stable, most recently 130's and stable   · Continue current antihypertensive regimen which is as follows:  · Lisinopril 40 mg daily, please note that this is an increase from her home dose of 20 mg daily prior to admission  · Amlodipine 10 mg daily, please note this is a new medication since admission  · Metoprolol tartrate 50 mg b i d   · No change at this time         Hypokalemia   Assessment & Plan      · Pt is sp repletion yesterday today improved, but still mildly low, will add another 40meq po   Skin tear of left elbow without complication   Assessment & Plan    · Secondary to unwitnessed fall, trauma appreciated   ·  No need for primary closure per trauma        Unwitnessed fall   Assessment & Plan    · Patient sustained an unwitnessed fall in the bathroom  Day prior to admission please see JEANNE Hernandez significant event report at the time of the event as well as subsequent trauma consultation note  · Their recommendations are appreciated  · CT head was negative for acute intracranial abnormality and x-rays of the upper extremities were negative        Distal radius fracture, left   Assessment & Plan    · And ulna styloid fracture      · Patient is known to ortho, Dr Goran Villalobos, of [facility] (pt does report cast should be removed has been on a long tme)   · XR last evening s/p fall with results report reading stable alignment, no new fracture  ·  Niurka Jacobson PA-C of Dr Tenisha Jade office yesterday discussed with JEANNE Elkins, Per El Louis, the cast is to stay in place for now and the patient is to follow up with Dr Bev Benjamin at discharge            VTE Pharmacologic Prophylaxis:   Pharmacologic: Enoxaparin (Lovenox)  Mechanical VTE Prophylaxis in Place: Yes    Patient Centered Rounds: I have performed bedside rounds with nursing staff today  Discussions with Specialists or Other Care Team Provider: nursing     Education and Discussions with Family / Patient: patient     Time Spent for Care: 30 minutes  More than 50% of total time spent on counseling and coordination of care as described above  Current Length of Stay: 6 day(s)    Current Patient Status: Inpatient   Certification Statement: The patient will continue to require additional inpatient hospital stay due to pending placement likely tomorrow     Discharge Plan: discharge to rehab when insurance authed     Code Status: Level 3 - DNAR and DNI      Subjective:   Pt has no complaints at this time  States she has no nausea vomiting or diarrhea  She is concerned about her cast coming off her left arm states that it was supposed to come off     Objective:     Vitals:   Temp (24hrs), Av 1 °F (36 7 °C), Min:97 6 °F (36 4 °C), Max:98 7 °F (37 1 °C)    HR:  [69-80] 77  Resp:  [18-20] 20  BP: (131-178)/(60-75) 131/60  SpO2:  [94 %-96 %] 96 %  Body mass index is 26 09 kg/m²  Input and Output Summary (last 24 hours): Intake/Output Summary (Last 24 hours) at 18 1510  Last data filed at 18 1306   Gross per 24 hour   Intake              480 ml   Output                0 ml   Net              480 ml       Physical Exam:     Physical Exam   Constitutional: She appears well-developed  No distress  HENT:   Head: Normocephalic and atraumatic     Mouth/Throat: No oropharyngeal exudate  Eyes: Conjunctivae and EOM are normal  Pupils are equal, round, and reactive to light  Right eye exhibits no discharge  Left eye exhibits no discharge  No scleral icterus  Neck: No JVD present  No tracheal deviation present  No thyromegaly present  Cardiovascular: Normal rate  Exam reveals no gallop and no friction rub  No murmur heard  Pulmonary/Chest: No stridor  No respiratory distress  She has no wheezes  She has no rales  She exhibits no tenderness  Poor effort    Abdominal: Soft  She exhibits no distension and no mass  There is no tenderness  There is no rebound and no guarding  Musculoskeletal: She exhibits deformity (left arm in a cast at this time , fingers warm to touch good brachial )  Lymphadenopathy:     She has no cervical adenopathy  Neurological: She is alert  2 -3   Skin: Skin is warm  No rash noted  She is not diaphoretic  No erythema  No pallor  Psychiatric: She has a normal mood and affect  Additional Data:     Labs:      Results from last 7 days  Lab Units 01/03/18  0534   WBC Thousand/uL 4 71   HEMOGLOBIN g/dL 10 9*   HEMATOCRIT % 32 6*   PLATELETS Thousands/uL 221       Results from last 7 days  Lab Units 01/04/18  0531   SODIUM mmol/L 142   POTASSIUM mmol/L 3 4*   CHLORIDE mmol/L 108   CO2 mmol/L 26   BUN mg/dL 7   CREATININE mg/dL 0 54*   CALCIUM mg/dL 8 5   GLUCOSE RANDOM mg/dL 89           * I Have Reviewed All Lab Data Listed Above  * Additional Pertinent Lab Tests Reviewed: All Labs Within Last 24 Hours Reviewed    Imaging:    Imaging Reports Reviewed Today Include: reviewed       Recent Cultures (last 7 days):       Results from last 7 days  Lab Units 12/30/17  1539   C DIFF TOXIN B  NEGATIVE for C difficle toxin by PCR          Last 24 Hours Medication List:     EMS replenish medication  Does not apply Once   amLODIPine 10 mg Oral Daily   aspirin 81 mg Oral Once per day on Mon Thu   cholecalciferol 1,000 Units Oral BID   enoxaparin 40 mg Subcutaneous Daily   lisinopril 40 mg Oral Daily   metoprolol tartrate 50 mg Oral BID   polyethylene glycol 17 g Oral Once   potassium chloride 40 mEq Oral Once        Today, Patient Was Seen By: DHAVAL Felder    ** Please Note: Dictation voice to text software may have been used in the creation of this document   **

## 2018-01-04 NOTE — PLAN OF CARE
Problem: OCCUPATIONAL THERAPY ADULT  Goal: Performs self-care activities at highest level of function for planned discharge setting  See evaluation for individualized goals  Treatment Interventions: ADL retraining, Functional transfer training          See flowsheet documentation for full assessment, interventions and recommendations  Outcome: Progressing  Limitation: Decreased ADL status, Decreased UE strength, Decreased UE ROM, Decreased Safe judgement during ADL, Decreased cognition, Decreased endurance, Decreased high-level ADLs, Decreased self-care trans  Prognosis: Fair  Assessment: Pt may benefit from w/c level d/c plan 2* poor standing tolerance/balance, strength deficits, and endurance limiations   PT notified      OT Discharge Recommendation: Short Term Rehab

## 2018-01-04 NOTE — PLAN OF CARE
Problem: PHYSICAL THERAPY ADULT  Goal: Performs mobility at highest level of function for planned discharge setting  See evaluation for individualized goals  Treatment/Interventions: Functional transfer training, LE strengthening/ROM, Therapeutic exercise, Endurance training, Patient/family training, Equipment eval/education, Bed mobility, Gait training  Equipment Recommended: Wheelchair       See flowsheet documentation for full assessment, interventions and recommendations  Outcome: Progressing  Prognosis: Fair  Problem List: Decreased strength, Decreased range of motion, Decreased endurance, Impaired balance, Decreased mobility, Decreased cognition, Impaired judgement, Decreased safety awareness, Orthopedic restrictions  Assessment: Pt participated in skilled PT session with focus on transfers and gait  Pt cont to display poor carryover of hand placement and NWB precautions LUE; continues to try to use LUE during transfers  Per PT note yesterday pt had difficulty sequencing HW; trialed pt ambulating along hallway railing using RUE  Pt presents with increased knee flexion during stance and forward flexed posture; pt remains high fall risk @ this time  Once pt is allowed to bear weight through LUE recommend trialing RW to maximize safety with gait  Used LE bike in order to increase pt's endurance; pt fatigued after 5 minutes  Practiced repetitions of SPT from w/c to recliner however still demo poor carryover of hand placement and requires constant vc's  Recommend short-term rehab in order to maximize I and safety with all functional mobility  Barriers to Discharge: Inaccessible home environment, Decreased caregiver support  Barriers to Discharge Comments: will verify with family regarding home set up and availability to provide assistance to pt at d/c  Recommendation: Short-term skilled PT, 24 hour supervision/assist          See flowsheet documentation for full assessment

## 2018-01-04 NOTE — PHYSICAL THERAPY NOTE
PHYSICAL THERAPY NOTE          Patient Name: Lauren IRELANDQ'K Date: 1/4/2018 01/04/18 1050   Pain Assessment   Pain Assessment No/denies pain   Pain Score No Pain   Restrictions/Precautions   Weight Bearing Precautions Per Order Yes   LUE Weight Bearing Per Order NWB   Braces or Orthoses Splint   Other Precautions Fall Risk;1:1;Chair Alarm; Bed Alarm;Cognitive   General   Chart Reviewed Yes   Response to Previous Treatment Patient with no complaints from previous session  Cognition   Arousal/Participation Cooperative   Orientation Level Oriented to person;Oriented to time   Subjective   Subjective Pt agreeable to participate in PT session   Transfers   Sit to Stand 3  Moderate assistance   Additional items Assist x 1;Verbal cues   Stand to Sit 3  Moderate assistance   Additional items Assist x 1;Verbal cues   Stand pivot 2  Maximal assistance   Additional items Assist x 1; Increased time required;Verbal cues   Ambulation/Elevation   Gait pattern Improper Weight shift; Forward Flexion;Decreased foot clearance; Inconsistent vivian; Short stride  (inc knee flexion in stance)   Gait Assistance 3  Moderate assist   Additional items Assist x 1   Assistive Device (hallway railing)   Distance 30  (x3)   Balance   Static Sitting Fair   Dynamic Sitting Fair -   Static Standing Fair -   Dynamic Standing Poor +   Ambulatory Poor +   Activity Tolerance   Activity Tolerance Patient limited by fatigue   Equipment Use   Comments LE bike x 5 min  Assessment   Prognosis Fair   Problem List Decreased strength;Decreased range of motion;Decreased endurance; Impaired balance;Decreased mobility; Decreased cognition; Impaired judgement;Decreased safety awareness;Orthopedic restrictions   Assessment Pt participated in skilled PT session with focus on transfers and gait   Pt cont to display poor carryover of hand placement and NWB precautions LUE; continues to try to use LUE during transfers  Per PT note yesterday pt had difficulty sequencing HW; trialed pt ambulating along hallway railing using RUE  Pt presents with increased knee flexion during stance and forward flexed posture; pt remains high fall risk @ this time  Once pt is allowed to bear weight through LUE recommend trialing RW to maximize safety with gait  Used LE bike in order to increase pt's endurance; pt fatigued after 5 minutes  Practiced repetitions of SPT from w/c to recliner however still demo poor carryover of hand placement and requires constant vc's  Recommend short-term rehab in order to maximize I and safety with all functional mobility  Barriers to Discharge Inaccessible home environment;Decreased caregiver support   Plan   Treatment/Interventions Functional transfer training;LE strengthening/ROM; Therapeutic exercise; Endurance training;Patient/family training;Equipment eval/education; Bed mobility;Gait training   Progress Slow progress, cognitive deficits   PT Frequency 5x/wk   Recommendation   Recommendation Short-term skilled PT;24 hour supervision/assist   Equipment Recommended Wheelchair

## 2018-01-04 NOTE — ASSESSMENT & PLAN NOTE
· Resolved    · Pt with no diarrhea and now with no bm will add one time dose of miralax now and monitor   · Possibly viral gastroenteritis   · C diff and stool enteric bacterial panel by PCR all negative

## 2018-01-05 VITALS
HEART RATE: 77 BPM | TEMPERATURE: 98.2 F | WEIGHT: 152 LBS | OXYGEN SATURATION: 96 % | HEIGHT: 61 IN | BODY MASS INDEX: 28.7 KG/M2 | DIASTOLIC BLOOD PRESSURE: 72 MMHG | SYSTOLIC BLOOD PRESSURE: 164 MMHG | RESPIRATION RATE: 18 BRPM

## 2018-01-05 PROBLEM — R19.7 DIARRHEA: Status: RESOLVED | Noted: 2017-12-28 | Resolved: 2018-01-05

## 2018-01-05 PROBLEM — R29.6 UNWITNESSED FALL: Status: RESOLVED | Noted: 2018-01-02 | Resolved: 2018-01-05

## 2018-01-05 PROBLEM — F03.90 DEMENTIA (HCC): Status: RESOLVED | Noted: 2017-12-28 | Resolved: 2018-01-05

## 2018-01-05 PROBLEM — E87.6 HYPOKALEMIA: Status: RESOLVED | Noted: 2018-01-03 | Resolved: 2018-01-05

## 2018-01-05 LAB
ANION GAP SERPL CALCULATED.3IONS-SCNC: 8 MMOL/L (ref 4–13)
BUN SERPL-MCNC: 7 MG/DL (ref 5–25)
CALCIUM SERPL-MCNC: 8.8 MG/DL (ref 8.3–10.1)
CHLORIDE SERPL-SCNC: 106 MMOL/L (ref 100–108)
CO2 SERPL-SCNC: 27 MMOL/L (ref 21–32)
CREAT SERPL-MCNC: 0.61 MG/DL (ref 0.6–1.3)
GFR SERPL CREATININE-BSD FRML MDRD: 76 ML/MIN/1.73SQ M
GLUCOSE SERPL-MCNC: 93 MG/DL (ref 65–140)
POTASSIUM SERPL-SCNC: 3.8 MMOL/L (ref 3.5–5.3)
SODIUM SERPL-SCNC: 141 MMOL/L (ref 136–145)

## 2018-01-05 PROCEDURE — 97116 GAIT TRAINING THERAPY: CPT

## 2018-01-05 PROCEDURE — 97530 THERAPEUTIC ACTIVITIES: CPT

## 2018-01-05 PROCEDURE — 97112 NEUROMUSCULAR REEDUCATION: CPT

## 2018-01-05 PROCEDURE — 80048 BASIC METABOLIC PNL TOTAL CA: CPT | Performed by: NURSE PRACTITIONER

## 2018-01-05 RX ORDER — AMLODIPINE BESYLATE 10 MG/1
10 TABLET ORAL DAILY
Refills: 0
Start: 2018-01-06

## 2018-01-05 RX ORDER — LISINOPRIL 40 MG/1
40 TABLET ORAL DAILY
Refills: 0
Start: 2018-01-06

## 2018-01-05 RX ADMIN — AMLODIPINE BESYLATE 10 MG: 10 TABLET ORAL at 08:38

## 2018-01-05 RX ADMIN — VITAMIN D, TAB 1000IU (100/BT) 1000 UNITS: 25 TAB at 08:37

## 2018-01-05 RX ADMIN — METOPROLOL TARTRATE 50 MG: 50 TABLET ORAL at 08:38

## 2018-01-05 RX ADMIN — ENOXAPARIN SODIUM 40 MG: 40 INJECTION SUBCUTANEOUS at 08:35

## 2018-01-05 RX ADMIN — LISINOPRIL 40 MG: 20 TABLET ORAL at 08:38

## 2018-01-05 NOTE — DISCHARGE SUMMARY
Discharge- Vikki Fuller 3/23/1920, 80 y o  female MRN: [id number]    Unit/Bed#: -01 Encounter: [id number]    Primary Care Provider: Henna Graham MD   Date and time admitted to hospital: 12/28/2017 12:24 PM        Vomiting   Assessment & Plan    · Resolved  · Suspect viral gastroenteritis  · Off ivf  Pt has not complaints         Dementia, early onset   Assessment & Plan    · Mental status stable which per discussion with the patient's daughter appears to be baseline  · CT head negative for acute intracranial abnormality  · PT recommending rehab - patient's daughter is agreeable  · Supportive care  Monitor for signs of delirium  Consider geriatrics consult         * Diarrhea   Assessment & Plan    · Resolved  · Pt with no diarrhea   · Possibly viral gastroenteritis , will need to return to bowel regimen with caution   · C diff and stool enteric bacterial panel by PCR all negative          Essential hypertension   Assessment & Plan    · BP stable, most recently 130's and stable   · Continue current antihypertensive regimen which is as follows:  · Lisinopril 40 mg daily, please note that this is an increase from her home dose of 20 mg daily prior to admission  · Amlodipine 10 mg daily, please note this is a new medication since admission  · Metoprolol tartrate 50 mg b i d   · No change at this time         Hypokalemia   Assessment & Plan      · repleted         Skin tear of left elbow without complication   Assessment & Plan    · Secondary to unwitnessed fall, trauma appreciated   ·  No need for primary closure per trauma        Unwitnessed fall   Assessment & Plan    · Patient sustained an unwitnessed fall in the bathroom  Day prior to admission please see JEANNE Hernandez significant event report at the time of the event as well as subsequent trauma consultation note      · Their recommendations are appreciated  · CT head was negative for acute intracranial abnormality and x-rays of the upper extremities were negative        Distal radius fracture, left   Assessment & Plan    · And ulna styloid fracture  · Patient is known to ortho, Dr Goran Villalobos, of [facility]  · XR s/p fall with results report reading stable alignment, no new fracture  ·  Jessica Kemp PA-C of Dr [facility] Stallings office yesterday discussed with JEANNE Elkins, Per Feliberto Zarate, the cast is to stay in place for now and the patient is to follow up with Dr Goran Villalobos at discharge              Discharging Physician / Practitioner: David Snow  PCP: Nadja Bains MD  Admission Date: 12/28/2017  Discharge Date: 01/05/18    Disposition:      Short Term Rehab or SNF at a [place] [address] (see below)    For Discharges to [place] [address]:   · [address], DO - docomented on this  transition of care summary    Reason for Admission:  Vomiting/diarrhea since 1:00 a m  morning of admission  Consultations During Hospital Stay:  · Trauma dr Aurea Saunedrs    Procedures Performed:     · Left wrist x-ray:  Stable alignment no new fracture  · Left elbow x-ray no acute osseous abnormality  · Right wrist no acute osseous abnormality  · CT head with no acute intracranial pathology  · Left wrist x-ray no significant interval change in the alignment and appearance of the distal radial fracture  Ulnar styloid fracture not well visualized  · CT abdomen and pelvis with IV contrast demonstrates fluid-filled small and large bowel loops which may indicate Decadron her diarrheal illness  No evidence of bowel obstruction or focal inflammatory changes  ·   ·   ·     Medication Adjustments and Discharge Medications:  · Summary of Medication Adjustments made as a result of this hospitalization:  Norvasc 10 mg daily, lisinopril  · Medication Dosing Tapers - Please refer to Discharge Medication List for details on any medication dosing tapers (if applicable to patient)  none   · Medications being temporarily held (include recommended restart time): none   · Discharge Medication List: See after visit summary for reconciled discharge medications  Wound Care Recommendations:  When applicable, please see wound care section of After Visit Summary  Diet Recommendations at Discharge:  Diet -        Diet Orders            Start     Ordered    01/02/18 1111  Diet Regular; Regular House  Diet effective now     Question Answer Comment   Diet Type Regular    Regular Regular House    RD to adjust diet per protocol? Yes        01/02/18 1111    12/29/17 0729  Room Service  Once     Question:  Type of Service  Answer:  Room Service - Appropriate with Assistance    12/29/17 0740          Instructions for any Catheters / Lines Present at Discharge (including removal date, if applicable): none     Significant Findings / Test Results:     XR wrist 3+ vw left   Final Result by Zheng Sullivan MD (01/02 2353)      Stable alignment  No new fracture  Workstation performed: [id number]         XR elbow 3+ vw left   Final Result by Zhneg Sullivan MD (01/02 2352)      No acute osseous abnormality  Workstation performed: [id number]         XR wrist 3+ vw right   Final Result by Zheng Sullivan MD (01/02 2351)      No acute osseous abnormality  Workstation performed: [id number]         CT head wo contrast   Final Result by Christopher Mcdonald MD (01/02 1859)      1  No acute intracranial pathology  Workstation performed: [id number]         XR wrist 3+ vw left   Final Result by Ava Brown MD (01/01 1006)      No significant interval change in the alignment and appearance of the distal radial fracture  Ulnar styloid fracture not well visualized           Workstation performed: [id number]         CT abdomen pelvis with contrast   ED Interpretation by Kimberly Adrian DO (12/28 8646)    Order Details    Study Result       CT ABDOMEN AND PELVIS WITH IV CONTRAST       INDICATION:  Vomiting/diarrhea        COMPARISON: CT abdomen and pelvis 7/7/2012       TECHNIQUE:  CT examination of the abdomen and pelvis was performed  Reformatted images were created in axial, sagittal, and coronal planes          Radiation dose length product (DLP) for this visit:  461 5 mGy-cm   This examination, like all CT scans performed in the [facility], was performed utilizing techniques to minimize radiation dose exposure, including the use of iterative    reconstruction and automated exposure control        IV Contrast:  85 mL of iohexol (OMNIPAQUE)            Enteric Contrast:  Enteric contrast was not administered        FINDINGS:       ABDOMEN       LOWER CHEST:  No significant abnormalities identified in the lower chest        LIVER/BILIARY TREE:  Mild central biliary duct ectasia, similar and may reflect reservoir effect from prior cholecystectomy  No suspicious hepatic lesions        GALLBLADDER:  Gallbladder is surgically absent        SPLEEN:  Unremarkable        PANCREAS:  Moderately severe fatty involution without acute findings        ADRENAL GLANDS:  Unremarkable        KIDNEYS/URETERS:  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        STOMACH AND BOWEL:  Fluid-filled small and large bowel loops are seen without evidence for obstruction  Findings may reflect diarrheal illness  No focal inflammatory changes or fluid collections are identified        APPENDIX:  No findings to suggest appendicitis        ABDOMINOPELVIC CAVITY:  No free intraperitoneal air  Calcification in the right pelvic mesentery on image 37 is unchanged, presumably calcified lymph node  Miniscule free fluid in the pelvis        VESSELS:  Atherosclerotic changes are present  No evidence of aneurysm        PELVIS       REPRODUCTIVE ORGANS:  Patient is status post hysterectomy        URINARY BLADDER:  Unremarkable        ABDOMINAL WALL/INGUINAL REGIONS:  Small fat-containing umbilical hernia    There is atrophy of the rectus abdominis musculature with laxity and protuberance of the ventral abdominal wall        OSSEOUS STRUCTURES:  No acute fracture or destructive osseous lesion  Chronic appearing superior endplate compression deformity of L3  Distal left radial fracture is incidentally noted corresponding to findings on recent plain film earlier this month        IMPRESSION:       Fluid-filled small and large bowel loops which may be indicative of diarrheal illness  No evidence of bowel obstruction or focal inflammatory changes        Horseshoe kidney  Final Result by Mary Carpenter DO (12/28 4036)      Fluid-filled small and large bowel loops which may be indicative of diarrheal illness  No evidence of bowel obstruction or focal inflammatory changes  Horseshoe kidney  Workstation performed: [id number]             Incidental Findings:   · None     Test Results Pending at Discharge (will require follow up): · None      Outpatient Tests Requested:  · Follow up with pcp in one week   · Call to schedule follow-up with above-mentioned orthopedic surgeon for removal of left arm cast    Complications:  None     Hospital Course:     Holli Ortiz is a 80 y o  female patient who originally presented to the hospital on 12/28/2017 due to Patient has a history of early-onset dementia, vitamin-D deficiency, osteoporosis, hypertension and left radial ulnar fracture status post a fall  Patient is a very poor historian upon admission physician on admission noted that history came from daughter whom she lives with  Per patient's daughter symptoms started acutely at 1:00 a m  morning of admission which showed numerous episodes of nonbloody nonbilious emesis and numerous episodes of nonbloody loose stool  Patient has had no exposure to recollect of sick contacts however did report that multiple family members were at the house with the holidays    She stated that the patient had not had any food which they had not eaten and no other family members had become ill that they had not eaten  Patient denied any fevers chills or cough  Patient was admitted for further observation and hydration  Patient also was noted to have sustained a fall in the hospital in [phone] for which trauma had come to evaluate patient  Patient had walked to the bathroom by the time the nurses arrived after hearing bed alone the patient was found in the middle of the bathroom  She had no complaints at the time of the evaluation was confused at baseline  But very pleasant  Patient complained of no pain in left arm or elbow and did have cast from prior injury  Patient was hydrated with IV fluids, for which above-mentioned imaging was all negative  Vomiting and diarrhea resolved with hydration  Patient was noted to have some elevated blood pressures for which her lisinopril was increased to 40 mg p o  daily and amlodipine was added at 10 mg daily  No further changes were made  On occasion patient was mildly hypokalemic for which she was repleted and is currently stable  At this point patient is medically stable for discharge to follow up with her PCP in 1 week  And she is to follow up with the orthopedic physicians from [place] [address]  to have left arm cast removed  Stable for discharge    Condition at Discharge: fair     Discharge Day Visit / Exam:     Subjective:  Patient is pleasant  Clearly confused  Complains of no nausea vomiting diarrhea or lightheadedness  She does feel cold  Covered with blanket  Left arm with no pain  However some slight discomfort in right arm    Patient is feeling hungry ready for lunch    Vitals: Blood Pressure: 164/72 (01/05/18 0700)  Pulse: 77 (01/05/18 0700)  Temperature: 98 2 °F (36 8 °C) (01/05/18 0700)  Temp Source: Oral (01/05/18 0700)  Respirations: 18 (01/05/18 0700)  Height: 5' 1" (154 9 cm) (01/02/18 1513)  Weight - Scale: 68 9 kg (152 lb) (12/28/17 2123)  SpO2: 96 % (01/05/18 0700)  Exam:   Physical Exam   Constitutional: She appears well-developed  No distress  HENT:   Head: Normocephalic and atraumatic  Mouth/Throat: No oropharyngeal exudate  Eyes: Conjunctivae and EOM are normal  Pupils are equal, round, and reactive to light  Right eye exhibits no discharge  Left eye exhibits no discharge  No scleral icterus  Neck: No JVD present  No tracheal deviation present  No thyromegaly present  Cardiovascular: Normal rate and regular rhythm  Exam reveals no gallop and no friction rub  No murmur heard  Pulmonary/Chest: Effort normal  No stridor  No respiratory distress  She has no wheezes  She has no rales  She exhibits no tenderness  Abdominal: She exhibits no distension and no mass  There is no tenderness  There is no rebound and no guarding  Musculoskeletal: She exhibits deformity (left arm cast good pulses and sensation warm to touch )  She exhibits no edema or tenderness  Lymphadenopathy:     She has no cervical adenopathy  Neurological: She is alert  Skin: Skin is warm  No rash noted  She is not diaphoretic  No erythema  No pallor  Psychiatric: She has a normal mood and affect  Discussion with Family: none at bedside     Goals of Care Discussions:  · Code Status at Discharge: Level 3 - DNAR and DNI  · Were there any Goals of Care Discussions during Hospitalization?: No  · Results of any General Goals of Care Discussions: on admission   · POLST Completed: No   · If POLST Completed, Summary of POLST Agreement Provided Here: non   · OK to Rehospitalize if Needed? No    Discharge instructions/Information to patient and family:   See after visit summary section titled Discharge Instructions for information provided to patient and family  Planned Readmission: high risk      Discharge Statement:  I spent 45 minutes discharging the patient  This time was spent on the day of discharge  I had direct contact with the patient on the day of discharge   Greater than 50% of the total time was spent examining patient, answering all patient questions, arranging and discussing plan of care with patient as well as directly providing post-discharge instructions  Additional time then spent on discharge activities      ** Please Note: This note has been constructed using a voice recognition system **

## 2018-01-05 NOTE — ASSESSMENT & PLAN NOTE
· Resolved    · Pt with no diarrhea   · Possibly viral gastroenteritis , will need to return to bowel regimen with caution   · C diff and stool enteric bacterial panel by PCR all negative

## 2018-01-05 NOTE — SOCIAL WORK
CM reviewed Pt in care coordination rounds, Pt is medically ready for D/C today  Transport scheduled with JOE SCHMITT at 4:30PM to [facility]  Facility informed  CEASAR spoke with Pt's daughter Melanie Hernandez via telephone to update with transport time  RN aware

## 2018-01-05 NOTE — ASSESSMENT & PLAN NOTE
· And ulna styloid fracture      · Patient is known to ortho, Dr Meir Todd, of [facility]  · XR s/p fall with results report reading stable alignment, no new fracture  ·  Nora Abdi PA-C of Dr Tani Stuart office yesterday discussed with JEANNE Elkins, Per Jimbo Duarte, the cast is to stay in place for now and the patient is to follow up with Dr Meir Todd at discharge

## 2018-01-05 NOTE — NURSING NOTE
Pt D/c to discharge demondunge awiting for   Written and verbal instructions given   No C/O SOB, pain oe discomfort at this time

## 2018-01-05 NOTE — ASSESSMENT & PLAN NOTE
· Mental status stable which per discussion with the patient's daughter appears to be baseline  · CT head negative for acute intracranial abnormality  · PT recommending rehab - patient's daughter is agreeable  · Supportive care  Monitor for signs of delirium   Consider geriatrics consult

## 2018-01-05 NOTE — DISCHARGE INSTRUCTIONS
Please follow up with ortho to have your cast removed this next week     Gastroenteritis   WHAT YOU NEED TO KNOW:   Gastroenteritis, or stomach flu, is an infection of the stomach and intestines  DISCHARGE INSTRUCTIONS:   Call 911 for any of the following:   · You have trouble breathing or a very fast pulse  Seek care immediately if:   · You see blood in your diarrhea  · You cannot stop vomiting  · You have not urinated for 12 hours  · You feel like you are going to faint  Contact your healthcare provider if:   · You have a fever  · You continue to vomit or have diarrhea, even after treatment  · You see worms in your diarrhea  · Your mouth or eyes are dry  You are not urinating as much or as often  · You have questions or concerns about your condition or care  Medicines:   · Medicines  may be given to stop vomiting or diarrhea, decrease abdominal cramps, or treat an infection  · Take your medicine as directed  Contact your healthcare provider if you think your medicine is not helping or if you have side effects  Tell him or her if you are allergic to any medicine  Keep a list of the medicines, vitamins, and herbs you take  Include the amounts, and when and why you take them  Bring the list or the pill bottles to follow-up visits  Carry your medicine list with you in case of an emergency  Manage your symptoms:   · Drink liquids as directed  Ask your healthcare provider how much liquid to drink each day, and which liquids are best for you  You may also need to drink an oral rehydration solution (ORS)  An ORS has the right amounts of sugar, salt, and minerals in water to replace body fluids  · Eat bland foods  When you feel hungry, begin eating soft, bland foods  Examples are bananas, clear soup, potatoes, and applesauce  Do not have dairy products, alcohol, sugary drinks, or drinks with caffeine until you feel better  · Rest as much as possible    Slowly start to do more each day when you begin to feel better  Prevent the spread of gastroenteritis:  Gastroenteritis can spread easily  Keep yourself, your family, and your surroundings clean to help prevent the spread of gastroenteritis:  · Wash your hands often  Use soap and water  Wash your hands after you use the bathroom, change a child's diapers, or sneeze  Wash your hands before you prepare or eat food  · Clean surfaces and do laundry often  Wash your clothes and towels separately from the rest of the laundry  Clean surfaces in your home with antibacterial  or bleach  · Clean food thoroughly and cook safely  Wash raw vegetables before you cook  Cook meat, fish, and eggs fully  Do not use the same dishes for raw meat as you do for other foods  Refrigerate any leftover food immediately  · Be aware when you camp or travel  Drink only clean water  Do not drink from rivers or lakes unless you purify or boil the water first  When you travel, drink bottled water and do not add ice  Do not eat fruit that has not been peeled  Do not eat raw fish or meat that is not fully cooked  Follow up with your healthcare provider as directed:  Write down your questions so you remember to ask them during your visits  © 2017 [employer]  Information is for End User's use only and may not be sold, redistributed or otherwise used for commercial purposes  All illustrations and images included in CareNotes® are the copyrighted property of [employer]  or [employer]  The above information is an  only  It is not intended as medical advice for individual conditions or treatments  Talk to your doctor, nurse or pharmacist before following any medical regimen to see if it is safe and effective for you

## 2018-01-06 NOTE — PROGRESS NOTES
Physical Therapy Progress Note     01/05/18 1050   Pain Assessment   Pain Assessment No/denies pain   Pain Score No Pain   Restrictions/Precautions   Weight Bearing Precautions Per Order Yes   LUE Weight Bearing Per Order NWB   Braces or Orthoses Splint  (L UE)   Other Precautions Cognitive; Chair Alarm; Bed Alarm; Fall Risk;WBS   General   Chart Reviewed Yes   Response to Previous Treatment Patient with no complaints from previous session  Family/Caregiver Present No   Cognition   Overall Cognitive Status Impaired   Arousal/Participation Cooperative   Attention Attends with cues to redirect   Orientation Level Oriented to person;Oriented to time   Memory Decreased recall of precautions;Decreased recall of recent events;Decreased short term memory;Decreased recall of biographical information;Decreased long term memory   Following Commands Follows one step commands with increased time or repetition   Subjective   Subjective denies pain; no c/o   Bed Mobility   Rolling R 4  Minimal assistance   Additional items Assist x 1   Rolling L 4  Minimal assistance   Additional items Assist x 1   Supine to Sit 4  Minimal assistance   Additional items Assist x 1   Sit to Supine 4  Minimal assistance   Additional items Assist x 1   Transfers   Sit to Stand 3  Moderate assistance   Additional items Assist x 1   Stand to Sit 3  Moderate assistance   Additional items Assist x 1   Stand pivot 3  Moderate assistance   Additional items Assist x 1   Additional Comments modA x1 for STS/SPT   Ambulation/Elevation   Gait pattern Improper Weight shift; Forward Flexion;Decreased foot clearance; Inconsistent vivian   Gait Assistance 3  Moderate assist   Additional items Assist x 1   Assistive Device (hand hold assist)   Distance 30   Stair Management Assistance Not tested   Balance   Static Sitting Fair   Dynamic Sitting Fair -   Static Standing Fair -   Dynamic Standing Poor +   Ambulatory Poor +   Endurance Deficit   Endurance Deficit Yes Endurance Deficit Description limited by fatigue   Activity Tolerance   Activity Tolerance Patient limited by fatigue   Assessment   Prognosis Fair   Problem List Decreased strength;Decreased range of motion;Decreased endurance; Impaired balance;Decreased mobility; Decreased cognition; Impaired judgement;Decreased safety awareness;Orthopedic restrictions   Assessment Pt supine in bed prior to session; modA for supine <> sit and modA for STS/SPT with HHA; pt amb 30' in hallways using HHA and Leonidas; assisted on/off toilet with modA and additional time; constant cueing throughout session to not use L UE (wrist) d/t NWB status and casted extremity; pt finished session supine in bed with alarms active and all needs in reach; recommend cont PT POC; Barriers to Discharge Inaccessible home environment;Decreased caregiver support   Goals   Patient Goals to go home   STG Expiration Date 01/05/18   Plan   Treatment/Interventions ADL retraining;Functional transfer training;LE strengthening/ROM; Elevations; Therapeutic exercise; Endurance training;Cognitive reorientation;Patient/family training;Equipment eval/education; Bed mobility;Gait training   Progress Improving as expected   PT Frequency 5x/wk   Recommendation   Recommendation Short-term skilled PT   Equipment Recommended Wheelchair     Stewart Seip, [place]

## 2018-01-06 NOTE — PLAN OF CARE
Problem: PHYSICAL THERAPY ADULT  Goal: Performs mobility at highest level of function for planned discharge setting  See evaluation for individualized goals  Treatment/Interventions: ADL retraining, Functional transfer training, LE strengthening/ROM, Elevations, Therapeutic exercise, Endurance training, Cognitive reorientation, Patient/family training, Equipment eval/education, Bed mobility, Gait training  Equipment Recommended: Wheelchair       See flowsheet documentation for full assessment, interventions and recommendations  Outcome: Progressing  Prognosis: Fair  Problem List: Decreased strength, Decreased range of motion, Decreased endurance, Impaired balance, Decreased mobility, Decreased cognition, Impaired judgement, Decreased safety awareness, Orthopedic restrictions  Assessment: Pt supine in bed prior to session; modA for supine <> sit and modA for STS/SPT with HHA; pt amb 30' in hallways using HHA and Leonidas; assisted on/off toilet with modA and additional time; constant cueing throughout session to not use L UE (wrist) d/t NWB status and casted extremity; pt finished session supine in bed with alarms active and all needs in reach; recommend cont PT POC; Barriers to Discharge: Inaccessible home environment, Decreased caregiver support  Barriers to Discharge Comments: will verify with family regarding home set up and availability to provide assistance to pt at d/c  Recommendation: Short-term skilled PT          See flowsheet documentation for full assessment

## 2018-01-08 ENCOUNTER — GENERIC CONVERSION - ENCOUNTER (OUTPATIENT)
Dept: OTHER | Facility: OTHER | Age: 83
End: 2018-01-08

## 2018-01-11 ENCOUNTER — GENERIC CONVERSION - ENCOUNTER (OUTPATIENT)
Dept: INTERNAL MEDICINE CLINIC | Facility: CLINIC | Age: 83
End: 2018-01-11

## 2018-01-12 VITALS
TEMPERATURE: 97.7 F | RESPIRATION RATE: 16 BRPM | DIASTOLIC BLOOD PRESSURE: 78 MMHG | WEIGHT: 154.1 LBS | SYSTOLIC BLOOD PRESSURE: 136 MMHG | BODY MASS INDEX: 29.09 KG/M2 | HEIGHT: 61 IN | HEART RATE: 62 BPM

## 2018-01-13 VITALS
DIASTOLIC BLOOD PRESSURE: 76 MMHG | SYSTOLIC BLOOD PRESSURE: 126 MMHG | HEART RATE: 68 BPM | HEIGHT: 61 IN | RESPIRATION RATE: 14 BRPM

## 2018-01-13 VITALS — HEART RATE: 72 BPM | DIASTOLIC BLOOD PRESSURE: 78 MMHG | SYSTOLIC BLOOD PRESSURE: 128 MMHG | RESPIRATION RATE: 14 BRPM

## 2018-01-13 VITALS — HEART RATE: 78 BPM | DIASTOLIC BLOOD PRESSURE: 80 MMHG | SYSTOLIC BLOOD PRESSURE: 130 MMHG | RESPIRATION RATE: 14 BRPM

## 2018-01-14 VITALS
RESPIRATION RATE: 16 BRPM | HEIGHT: 61 IN | BODY MASS INDEX: 29.52 KG/M2 | WEIGHT: 156.38 LBS | HEART RATE: 60 BPM | DIASTOLIC BLOOD PRESSURE: 80 MMHG | SYSTOLIC BLOOD PRESSURE: 142 MMHG | TEMPERATURE: 98.1 F

## 2018-01-22 VITALS — DIASTOLIC BLOOD PRESSURE: 80 MMHG | RESPIRATION RATE: 14 BRPM | SYSTOLIC BLOOD PRESSURE: 130 MMHG | HEART RATE: 72 BPM

## 2018-01-23 NOTE — MISCELLANEOUS
History of Present Illness  TCM Communication [facility]: The patient is being contacted for follow-up after hospitalization  Hospital records were reviewed  She was hospitalized at [facility]  The date of admission: 12/28/17, date of discharge: 1/5/2018  Diagnosis: Diarrhea  She was discharged to a rehabilitation center  She did not schedule a follow up appointment  Follow-up appointments with other specialists: NO MICHOACANO, PT ADMITTED TO REHAB  Communication performed and completed by [facility]      Active Problems    1  Arthritis (716 90) (M19 90)   2  Atherosclerosis (440 9) (I70 90)   3  Avulsion injury (879 8) (T14 8XXA)   4  Cellulitis of extremity (L03 119)   5  Difficulty hearing (389 9) (H91 90)   6  Fall (E888 9) [facility])   7  Fatigue (780 79) (R53 83)   8  Hematoma (924 9) (T14 8XXA)   9  Hypertension (401 9) (I10)   10  Memory loss (780 93) (R41 3)   11  Need for influenza vaccination (V04 81) (Z23)   12  Open wound of right hand without complication (809 3) (V18 236S)   13  Osteoporosis (733 00) (M81 0)   14  Skin lesion (709 9) (L98 9)   15  Tremor (781 0) (R25 1)   16  Vitamin D deficiency (268 9) (E55 9)    Past Medical History    1  History of High risk medication use (V58 69) (Z79 899)   2  History of herpes zoster (V12 09) (Z86 19)   3  History of hyperthyroidism (V12 29) (Z86 39)   4  History of Hyperthyroidism (242 90) (E05 90)   5  History of Hypothyroidism (244 9) (E03 9)   6  History of Skin lesion (709 9) (L98 9)    Surgical History    1  History of Appendectomy   2  History of Cataract Surgery   3  History of Cholecystectomy   4  History of Hysterectomy   5  History of Tonsillectomy   6  History of Varicose Vein Ligation    Family History  Mother    1  No pertinent family history  Father    2  No pertinent family history    Social History    · Denied: History of Alcohol Use (History)   · Denied: History of Drug Use   · Never A Smoker    Current Meds   1   Aspirin EC 81 MG Oral Tablet Delayed Release; 1 tablet qod; Therapy: 24SGR8300 to Recorded   2  Lidocaine HCl - 2 % External Gel; Therapy: (Recorded:11Wod6733) to Recorded   3  Lisinopril 20 MG Oral Tablet; Take 1 tablet daily; Therapy: 40ABI6050 to (Evaluate:94Gmf7985)  Requested for: 71BRY9693; Last   Rx:71Wby6287 Ordered   4  Metoprolol Tartrate 50 MG Oral Tablet; TAKE 1 TABLET TWICE DAILY as directed; Therapy: 52YVP1492 to (Plato Skains)  Requested for: 42ZNX3210; Last   CH:35OBO2380 Ordered   5  Vitamin D (Ergocalciferol) 46082 UNIT Oral Capsule; Take one capsule once monthly; Therapy: 60HJY4302 to (Evaluate:09Dvt6241)  Requested for: 32JYF1403; Last   Rx:46Gwu5483 Ordered   6  Vitamin D3 2000 UNIT Oral Capsule; take 1 capsule by mouth once daily; Therapy: 59NAB6634 to Recorded    Allergies    1   No Known Drug Allergies    Signatures   Electronically signed by : ANASTASIA Patino ; Jan 8 2018  9:26AM EST                       (Author)

## 2018-03-22 ENCOUNTER — TELEPHONE (OUTPATIENT)
Dept: FAMILY MEDICINE CLINIC | Facility: CLINIC | Age: 83
End: 2018-03-22

## 2018-03-22 NOTE — TELEPHONE ENCOUNTER
Dr Ivan Quevedo,  Patient resident at [facility] was seen today by Michael Stokes from [facility], stated patient had an open wound draining clear fluid at lower rgt leg that was dresses today  She'll continue care every other day for a week then reevaluate frequency  If there are any questions caller can be reached at [phone]

## 2018-03-26 NOTE — TELEPHONE ENCOUNTER
[id number] Noah Mayes calling to let Dr Ladi Krishna,  Know they went in to care for wounds and started a new product that does not stick  If there are any questions caller Suzanne Stoddard can be reached at [phone]

## 2018-04-26 ENCOUNTER — TELEPHONE (OUTPATIENT)
Dept: FAMILY MEDICINE CLINIC | Facility: CLINIC | Age: 83
End: 2018-04-26

## 2018-04-26 NOTE — TELEPHONE ENCOUNTER
Georgie from [facility] faxed over paper for [facility] for pt that Dr Everette Nova filled out  She says the last 5 questions Dr checked off need explanations written for the answers then can be faxed back to [place] at [phone]   In Dr Eva nguyễn in triage

## 2018-05-30 ENCOUNTER — TELEPHONE (OUTPATIENT)
Dept: FAMILY MEDICINE CLINIC | Facility: CLINIC | Age: 83
End: 2018-05-30

## 2018-05-31 NOTE — TELEPHONE ENCOUNTER
Alida Tejada to report they are in the home of the patient at this moment  Sometimew over the weekend patient bumped her arm against something causing an upper arm tear which will be addressed during visit with dermliuam  No need for a call back will sent report later

## 2018-06-06 ENCOUNTER — TELEPHONE (OUTPATIENT)
Dept: FAMILY MEDICINE CLINIC | Facility: CLINIC | Age: 83
End: 2018-06-06

## 2018-06-07 ENCOUNTER — TELEPHONE (OUTPATIENT)
Dept: FAMILY MEDICINE CLINIC | Facility: CLINIC | Age: 83
End: 2018-06-07

## 2018-06-10 ENCOUNTER — HOSPITAL ENCOUNTER (INPATIENT)
Facility: HOSPITAL | Age: 83
LOS: 3 days | Discharge: NURSING, REHAB OR LONG-TERM CARE | DRG: 481 | End: 2018-06-14
Attending: EMERGENCY MEDICINE | Admitting: FAMILY MEDICINE
Payer: MEDICARE
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
## 2018-06-10 DIAGNOSIS — M25.511 RIGHT SHOULDER PAIN: ICD-10-CM

## 2018-06-10 DIAGNOSIS — E86.0 DEHYDRATION: ICD-10-CM

## 2018-06-10 DIAGNOSIS — S72.001A CLOSED FRACTURE OF NECK OF RIGHT FEMUR, INITIAL ENCOUNTER (HCC): Primary | ICD-10-CM

## 2018-06-10 RX ORDER — FENTANYL CITRATE 50 UG/ML
25 INJECTION, SOLUTION INTRAMUSCULAR; INTRAVENOUS ONCE
Status: COMPLETED | OUTPATIENT
Start: 2018-06-11 | End: 2018-06-11

## 2018-06-10 RX ORDER — LANOLIN ALCOHOL/MO/W.PET/CERES
1000 CREAM (GRAM) TOPICAL DAILY
COMMUNITY

## 2018-06-11 ENCOUNTER — APPOINTMENT (EMERGENCY)
Dept: RADIOLOGY | Facility: HOSPITAL | Age: 83
DRG: 481 | End: 2018-06-11
Payer: MEDICARE

## 2018-06-11 ENCOUNTER — APPOINTMENT (INPATIENT)
Dept: RADIOLOGY | Facility: HOSPITAL | Age: 83
DRG: 481 | End: 2018-06-11
Payer: MEDICARE

## 2018-06-11 ENCOUNTER — ANESTHESIA (INPATIENT)
Dept: PERIOP | Facility: HOSPITAL | Age: 83
DRG: 481 | End: 2018-06-11
Payer: MEDICARE

## 2018-06-11 ENCOUNTER — ANESTHESIA EVENT (INPATIENT)
Dept: PERIOP | Facility: HOSPITAL | Age: 83
DRG: 481 | End: 2018-06-11
Payer: MEDICARE

## 2018-06-11 PROBLEM — M81.0 OSTEOPOROSIS: Chronic | Status: ACTIVE | Noted: 2018-06-11

## 2018-06-11 PROBLEM — S72.001A CLOSED FRACTURE OF NECK OF RIGHT FEMUR (HCC): Status: ACTIVE | Noted: 2018-06-10

## 2018-06-11 LAB
25(OH)D3 SERPL-MCNC: 30.3 NG/ML (ref 30–100)
ABO GROUP BLD: NORMAL
ALBUMIN SERPL BCP-MCNC: 2.8 G/DL (ref 3.5–5)
ALP SERPL-CCNC: 71 U/L (ref 46–116)
ALT SERPL W P-5'-P-CCNC: 49 U/L (ref 12–78)
ANION GAP SERPL CALCULATED.3IONS-SCNC: 10 MMOL/L (ref 4–13)
ANION GAP SERPL CALCULATED.3IONS-SCNC: 8 MMOL/L (ref 4–13)
ANION GAP SERPL CALCULATED.3IONS-SCNC: 8 MMOL/L (ref 4–13)
APTT PPP: 29 SECONDS (ref 24–36)
AST SERPL W P-5'-P-CCNC: 52 U/L (ref 5–45)
ATRIAL RATE: 97 BPM
BACTERIA UR QL AUTO: ABNORMAL /HPF
BASOPHILS # BLD AUTO: 0.05 THOUSANDS/ΜL (ref 0–0.1)
BASOPHILS NFR BLD AUTO: 1 % (ref 0–1)
BILIRUB SERPL-MCNC: 1.01 MG/DL (ref 0.2–1)
BILIRUB UR QL STRIP: NEGATIVE
BLD GP AB SCN SERPL QL: NEGATIVE
BUN SERPL-MCNC: 10 MG/DL (ref 5–25)
BUN SERPL-MCNC: 8 MG/DL (ref 5–25)
BUN SERPL-MCNC: 9 MG/DL (ref 5–25)
CALCIUM SERPL-MCNC: 8.1 MG/DL (ref 8.3–10.1)
CALCIUM SERPL-MCNC: 8.5 MG/DL (ref 8.3–10.1)
CALCIUM SERPL-MCNC: 8.6 MG/DL (ref 8.3–10.1)
CHLORIDE SERPL-SCNC: 102 MMOL/L (ref 100–108)
CHLORIDE SERPL-SCNC: 96 MMOL/L (ref 100–108)
CHLORIDE SERPL-SCNC: 97 MMOL/L (ref 100–108)
CLARITY UR: ABNORMAL
CO2 SERPL-SCNC: 24 MMOL/L (ref 21–32)
CO2 SERPL-SCNC: 24 MMOL/L (ref 21–32)
CO2 SERPL-SCNC: 26 MMOL/L (ref 21–32)
COLOR UR: ABNORMAL
CREAT SERPL-MCNC: 0.6 MG/DL (ref 0.6–1.3)
CREAT SERPL-MCNC: 0.67 MG/DL (ref 0.6–1.3)
CREAT SERPL-MCNC: 0.69 MG/DL (ref 0.6–1.3)
EOSINOPHIL # BLD AUTO: 0 THOUSAND/ΜL (ref 0–0.61)
EOSINOPHIL NFR BLD AUTO: 0 % (ref 0–6)
ERYTHROCYTE [DISTWIDTH] IN BLOOD BY AUTOMATED COUNT: 12.6 % (ref 11.6–15.1)
ERYTHROCYTE [DISTWIDTH] IN BLOOD BY AUTOMATED COUNT: 12.7 % (ref 11.6–15.1)
ERYTHROCYTE [SEDIMENTATION RATE] IN BLOOD: 23 MM/HOUR (ref 0–20)
GFR SERPL CREATININE-BSD FRML MDRD: 73 ML/MIN/1.73SQ M
GFR SERPL CREATININE-BSD FRML MDRD: 73 ML/MIN/1.73SQ M
GFR SERPL CREATININE-BSD FRML MDRD: 76 ML/MIN/1.73SQ M
GLUCOSE SERPL-MCNC: 131 MG/DL (ref 65–140)
GLUCOSE SERPL-MCNC: 137 MG/DL (ref 65–140)
GLUCOSE SERPL-MCNC: 139 MG/DL (ref 65–140)
GLUCOSE UR STRIP-MCNC: NEGATIVE MG/DL
HCT VFR BLD AUTO: 31.9 % (ref 34.8–46.1)
HCT VFR BLD AUTO: 32.2 % (ref 34.8–46.1)
HGB BLD-MCNC: 10.2 G/DL (ref 11.5–15.4)
HGB BLD-MCNC: 10.4 G/DL (ref 11.5–15.4)
HGB UR QL STRIP.AUTO: NEGATIVE
HYALINE CASTS #/AREA URNS LPF: ABNORMAL /LPF
IMM GRANULOCYTES # BLD AUTO: 0.12 THOUSAND/UL (ref 0–0.2)
IMM GRANULOCYTES NFR BLD AUTO: 1 % (ref 0–2)
INR PPP: 0.97 (ref 0.86–1.17)
KETONES UR STRIP-MCNC: NEGATIVE MG/DL
LEUKOCYTE ESTERASE UR QL STRIP: ABNORMAL
LYMPHOCYTES # BLD AUTO: 1 THOUSANDS/ΜL (ref 0.6–4.47)
LYMPHOCYTES NFR BLD AUTO: 11 % (ref 14–44)
MCH RBC QN AUTO: 30.3 PG (ref 26.8–34.3)
MCH RBC QN AUTO: 31.4 PG (ref 26.8–34.3)
MCHC RBC AUTO-ENTMCNC: 31.7 G/DL (ref 31.4–37.4)
MCHC RBC AUTO-ENTMCNC: 32.6 G/DL (ref 31.4–37.4)
MCV RBC AUTO: 96 FL (ref 82–98)
MCV RBC AUTO: 96 FL (ref 82–98)
MONOCYTES # BLD AUTO: 0.63 THOUSAND/ΜL (ref 0.17–1.22)
MONOCYTES NFR BLD AUTO: 7 % (ref 4–12)
NEUTROPHILS # BLD AUTO: 7.63 THOUSANDS/ΜL (ref 1.85–7.62)
NEUTS SEG NFR BLD AUTO: 80 % (ref 43–75)
NITRITE UR QL STRIP: NEGATIVE
NON-SQ EPI CELLS URNS QL MICRO: ABNORMAL /HPF
NRBC BLD AUTO-RTO: 0 /100 WBCS
P AXIS: 75 DEGREES
PH UR STRIP.AUTO: 5 [PH] (ref 4.5–8)
PLATELET # BLD AUTO: 257 THOUSANDS/UL (ref 149–390)
PLATELET # BLD AUTO: 278 THOUSANDS/UL (ref 149–390)
PMV BLD AUTO: 10.2 FL (ref 8.9–12.7)
PMV BLD AUTO: 9.9 FL (ref 8.9–12.7)
POTASSIUM SERPL-SCNC: 3.6 MMOL/L (ref 3.5–5.3)
POTASSIUM SERPL-SCNC: 3.9 MMOL/L (ref 3.5–5.3)
POTASSIUM SERPL-SCNC: 4.4 MMOL/L (ref 3.5–5.3)
PR INTERVAL: 262 MS
PROT SERPL-MCNC: 5.7 G/DL (ref 6.4–8.2)
PROT UR STRIP-MCNC: NEGATIVE MG/DL
PROTHROMBIN TIME: 13 SECONDS (ref 11.8–14.2)
PTH-INTACT SERPL-MCNC: 103 PG/ML (ref 18.4–80.1)
QRS AXIS: 39 DEGREES
QRSD INTERVAL: 88 MS
QT INTERVAL: 334 MS
QTC INTERVAL: 424 MS
RBC # BLD AUTO: 3.31 MILLION/UL (ref 3.81–5.12)
RBC # BLD AUTO: 3.37 MILLION/UL (ref 3.81–5.12)
RBC #/AREA URNS AUTO: ABNORMAL /HPF
RH BLD: NEGATIVE
SODIUM SERPL-SCNC: 130 MMOL/L (ref 136–145)
SODIUM SERPL-SCNC: 131 MMOL/L (ref 136–145)
SODIUM SERPL-SCNC: 134 MMOL/L (ref 136–145)
SP GR UR STRIP.AUTO: 1.02 (ref 1–1.03)
SPECIMEN EXPIRATION DATE: NORMAL
T WAVE AXIS: 91 DEGREES
TROPONIN I SERPL-MCNC: <0.02 NG/ML
TSH SERPL DL<=0.05 MIU/L-ACNC: 0.32 UIU/ML (ref 0.36–3.74)
UROBILINOGEN UR QL STRIP.AUTO: 0.2 E.U./DL
VENTRICULAR RATE: 97 BPM
WBC # BLD AUTO: 9.43 THOUSAND/UL (ref 4.31–10.16)
WBC # BLD AUTO: 9.63 THOUSAND/UL (ref 4.31–10.16)
WBC #/AREA URNS AUTO: ABNORMAL /HPF

## 2018-06-11 PROCEDURE — C1713 ANCHOR/SCREW BN/BN,TIS/BN: HCPCS | Performed by: ORTHOPAEDIC SURGERY

## 2018-06-11 PROCEDURE — 85027 COMPLETE CBC AUTOMATED: CPT | Performed by: FAMILY MEDICINE

## 2018-06-11 PROCEDURE — 73551 X-RAY EXAM OF FEMUR 1: CPT

## 2018-06-11 PROCEDURE — 99222 1ST HOSP IP/OBS MODERATE 55: CPT | Performed by: INTERNAL MEDICINE

## 2018-06-11 PROCEDURE — 84443 ASSAY THYROID STIM HORMONE: CPT | Performed by: ORTHOPAEDIC SURGERY

## 2018-06-11 PROCEDURE — C1769 GUIDE WIRE: HCPCS | Performed by: ORTHOPAEDIC SURGERY

## 2018-06-11 PROCEDURE — 86923 COMPATIBILITY TEST ELECTRIC: CPT

## 2018-06-11 PROCEDURE — 36415 COLL VENOUS BLD VENIPUNCTURE: CPT | Performed by: EMERGENCY MEDICINE

## 2018-06-11 PROCEDURE — 85610 PROTHROMBIN TIME: CPT | Performed by: EMERGENCY MEDICINE

## 2018-06-11 PROCEDURE — 86850 RBC ANTIBODY SCREEN: CPT | Performed by: ORTHOPAEDIC SURGERY

## 2018-06-11 PROCEDURE — 86901 BLOOD TYPING SEROLOGIC RH(D): CPT | Performed by: ORTHOPAEDIC SURGERY

## 2018-06-11 PROCEDURE — 70450 CT HEAD/BRAIN W/O DYE: CPT

## 2018-06-11 PROCEDURE — 80053 COMPREHEN METABOLIC PANEL: CPT | Performed by: ORTHOPAEDIC SURGERY

## 2018-06-11 PROCEDURE — 73590 X-RAY EXAM OF LOWER LEG: CPT

## 2018-06-11 PROCEDURE — 0QS606Z REPOSITION RIGHT UPPER FEMUR WITH INTRAMEDULLARY INTERNAL FIXATION DEVICE, OPEN APPROACH: ICD-10-PCS | Performed by: ORTHOPAEDIC SURGERY

## 2018-06-11 PROCEDURE — 85025 COMPLETE CBC W/AUTO DIFF WBC: CPT | Performed by: EMERGENCY MEDICINE

## 2018-06-11 PROCEDURE — 73560 X-RAY EXAM OF KNEE 1 OR 2: CPT

## 2018-06-11 PROCEDURE — 27236 TREAT THIGH FRACTURE: CPT | Performed by: ORTHOPAEDIC SURGERY

## 2018-06-11 PROCEDURE — 85652 RBC SED RATE AUTOMATED: CPT | Performed by: ORTHOPAEDIC SURGERY

## 2018-06-11 PROCEDURE — 99222 1ST HOSP IP/OBS MODERATE 55: CPT | Performed by: ORTHOPAEDIC SURGERY

## 2018-06-11 PROCEDURE — 85730 THROMBOPLASTIN TIME PARTIAL: CPT | Performed by: EMERGENCY MEDICINE

## 2018-06-11 PROCEDURE — 83970 ASSAY OF PARATHORMONE: CPT | Performed by: ORTHOPAEDIC SURGERY

## 2018-06-11 PROCEDURE — 82306 VITAMIN D 25 HYDROXY: CPT | Performed by: ORTHOPAEDIC SURGERY

## 2018-06-11 PROCEDURE — 73502 X-RAY EXAM HIP UNI 2-3 VIEWS: CPT

## 2018-06-11 PROCEDURE — 93010 ELECTROCARDIOGRAM REPORT: CPT | Performed by: INTERNAL MEDICINE

## 2018-06-11 PROCEDURE — 73501 X-RAY EXAM HIP UNI 1 VIEW: CPT

## 2018-06-11 PROCEDURE — 93005 ELECTROCARDIOGRAM TRACING: CPT

## 2018-06-11 PROCEDURE — 72125 CT NECK SPINE W/O DYE: CPT

## 2018-06-11 PROCEDURE — 80048 BASIC METABOLIC PNL TOTAL CA: CPT | Performed by: FAMILY MEDICINE

## 2018-06-11 PROCEDURE — 71045 X-RAY EXAM CHEST 1 VIEW: CPT

## 2018-06-11 PROCEDURE — 80048 BASIC METABOLIC PNL TOTAL CA: CPT | Performed by: EMERGENCY MEDICINE

## 2018-06-11 PROCEDURE — 99285 EMERGENCY DEPT VISIT HI MDM: CPT

## 2018-06-11 PROCEDURE — 84484 ASSAY OF TROPONIN QUANT: CPT | Performed by: EMERGENCY MEDICINE

## 2018-06-11 PROCEDURE — 86900 BLOOD TYPING SEROLOGIC ABO: CPT | Performed by: ORTHOPAEDIC SURGERY

## 2018-06-11 PROCEDURE — 73030 X-RAY EXAM OF SHOULDER: CPT

## 2018-06-11 PROCEDURE — 81001 URINALYSIS AUTO W/SCOPE: CPT | Performed by: FAMILY MEDICINE

## 2018-06-11 PROCEDURE — 27236 TREAT THIGH FRACTURE: CPT | Performed by: PHYSICIAN ASSISTANT

## 2018-06-11 DEVICE — 5.0MM TI LOCKING SCREW W/T25 STARDRIVE 40MM F/IM NAIL-STER: Type: IMPLANTABLE DEVICE | Site: FEMUR | Status: FUNCTIONAL

## 2018-06-11 DEVICE — 11.0MM TI HELICAL BLADE 105MM-STERILE: Type: IMPLANTABLE DEVICE | Site: FEMUR | Status: FUNCTIONAL

## 2018-06-11 DEVICE — 11MM/130 DEG TI CANN TROCH FIXATION NAIL 400MM/RIGHT-STER: Type: IMPLANTABLE DEVICE | Site: FEMUR | Status: FUNCTIONAL

## 2018-06-11 RX ORDER — ERGOCALCIFEROL 1.25 MG/1
50000 CAPSULE ORAL
Status: DISCONTINUED | OUTPATIENT
Start: 2018-06-15 | End: 2018-06-11

## 2018-06-11 RX ORDER — CHOLECALCIFEROL (VITAMIN D3) 125 MCG
1000 CAPSULE ORAL DAILY
Status: DISCONTINUED | OUTPATIENT
Start: 2018-06-11 | End: 2018-06-14 | Stop reason: HOSPADM

## 2018-06-11 RX ORDER — OXYCODONE HYDROCHLORIDE 5 MG/1
5 TABLET ORAL EVERY 4 HOURS PRN
Status: DISCONTINUED | OUTPATIENT
Start: 2018-06-11 | End: 2018-06-14 | Stop reason: HOSPADM

## 2018-06-11 RX ORDER — LABETALOL HYDROCHLORIDE 5 MG/ML
5 INJECTION, SOLUTION INTRAVENOUS
Status: DISCONTINUED | OUTPATIENT
Start: 2018-06-11 | End: 2018-06-11 | Stop reason: HOSPADM

## 2018-06-11 RX ORDER — METOPROLOL TARTRATE 50 MG/1
50 TABLET, FILM COATED ORAL 2 TIMES DAILY
Status: DISCONTINUED | OUTPATIENT
Start: 2018-06-11 | End: 2018-06-11

## 2018-06-11 RX ORDER — ONDANSETRON 2 MG/ML
4 INJECTION INTRAMUSCULAR; INTRAVENOUS ONCE AS NEEDED
Status: DISCONTINUED | OUTPATIENT
Start: 2018-06-11 | End: 2018-06-11 | Stop reason: HOSPADM

## 2018-06-11 RX ORDER — MELATONIN
1000 2 TIMES DAILY
Status: DISCONTINUED | OUTPATIENT
Start: 2018-06-11 | End: 2018-06-14 | Stop reason: HOSPADM

## 2018-06-11 RX ORDER — FENTANYL CITRATE/PF 50 MCG/ML
25 SYRINGE (ML) INJECTION
Status: DISCONTINUED | OUTPATIENT
Start: 2018-06-11 | End: 2018-06-11 | Stop reason: HOSPADM

## 2018-06-11 RX ORDER — ROPIVACAINE HYDROCHLORIDE 5 MG/ML
INJECTION, SOLUTION EPIDURAL; INFILTRATION; PERINEURAL AS NEEDED
Status: DISCONTINUED | OUTPATIENT
Start: 2018-06-11 | End: 2018-06-11 | Stop reason: SURG

## 2018-06-11 RX ORDER — OXYCODONE HYDROCHLORIDE 5 MG/1
5 TABLET ORAL EVERY 6 HOURS PRN
Qty: 15 TABLET | Refills: 0 | Status: SHIPPED | OUTPATIENT
Start: 2018-06-11

## 2018-06-11 RX ORDER — EPHEDRINE SULFATE 50 MG/ML
INJECTION, SOLUTION INTRAVENOUS AS NEEDED
Status: DISCONTINUED | OUTPATIENT
Start: 2018-06-11 | End: 2018-06-11 | Stop reason: SURG

## 2018-06-11 RX ORDER — ASPIRIN 81 MG/1
81 TABLET, CHEWABLE ORAL DAILY
Status: DISCONTINUED | OUTPATIENT
Start: 2018-06-11 | End: 2018-06-14 | Stop reason: HOSPADM

## 2018-06-11 RX ORDER — ONDANSETRON 2 MG/ML
4 INJECTION INTRAMUSCULAR; INTRAVENOUS EVERY 4 HOURS PRN
Status: DISCONTINUED | OUTPATIENT
Start: 2018-06-11 | End: 2018-06-14 | Stop reason: HOSPADM

## 2018-06-11 RX ORDER — LISINOPRIL 20 MG/1
40 TABLET ORAL DAILY
Status: DISCONTINUED | OUTPATIENT
Start: 2018-06-11 | End: 2018-06-14

## 2018-06-11 RX ORDER — MAGNESIUM HYDROXIDE 1200 MG/15ML
LIQUID ORAL AS NEEDED
Status: DISCONTINUED | OUTPATIENT
Start: 2018-06-11 | End: 2018-06-11 | Stop reason: HOSPADM

## 2018-06-11 RX ORDER — GINSENG 100 MG
1 CAPSULE ORAL ONCE
Status: COMPLETED | OUTPATIENT
Start: 2018-06-11 | End: 2018-06-11

## 2018-06-11 RX ORDER — OXYCODONE HYDROCHLORIDE 5 MG/1
2.5 TABLET ORAL EVERY 4 HOURS PRN
Status: DISCONTINUED | OUTPATIENT
Start: 2018-06-11 | End: 2018-06-14 | Stop reason: HOSPADM

## 2018-06-11 RX ORDER — ALBUTEROL SULFATE 2.5 MG/3ML
2.5 SOLUTION RESPIRATORY (INHALATION) ONCE AS NEEDED
Status: DISCONTINUED | OUTPATIENT
Start: 2018-06-11 | End: 2018-06-11 | Stop reason: HOSPADM

## 2018-06-11 RX ORDER — ONDANSETRON 2 MG/ML
INJECTION INTRAMUSCULAR; INTRAVENOUS AS NEEDED
Status: DISCONTINUED | OUTPATIENT
Start: 2018-06-11 | End: 2018-06-11 | Stop reason: SURG

## 2018-06-11 RX ORDER — ACETAMINOPHEN 325 MG/1
975 TABLET ORAL EVERY 8 HOURS SCHEDULED
Status: DISCONTINUED | OUTPATIENT
Start: 2018-06-11 | End: 2018-06-14 | Stop reason: HOSPADM

## 2018-06-11 RX ORDER — PROPOFOL 10 MG/ML
INJECTION, EMULSION INTRAVENOUS CONTINUOUS PRN
Status: DISCONTINUED | OUTPATIENT
Start: 2018-06-11 | End: 2018-06-11 | Stop reason: SURG

## 2018-06-11 RX ORDER — AMLODIPINE BESYLATE 10 MG/1
10 TABLET ORAL DAILY
Status: DISCONTINUED | OUTPATIENT
Start: 2018-06-11 | End: 2018-06-14

## 2018-06-11 RX ORDER — ROPIVACAINE HYDROCHLORIDE 2 MG/ML
INJECTION, SOLUTION EPIDURAL; INFILTRATION; PERINEURAL AS NEEDED
Status: DISCONTINUED | OUTPATIENT
Start: 2018-06-11 | End: 2018-06-11 | Stop reason: SURG

## 2018-06-11 RX ORDER — 0.9 % SODIUM CHLORIDE 0.9 %
3 VIAL (ML) INJECTION AS NEEDED
Status: DISCONTINUED | OUTPATIENT
Start: 2018-06-11 | End: 2018-06-14 | Stop reason: HOSPADM

## 2018-06-11 RX ORDER — METOPROLOL TARTRATE 50 MG/1
50 TABLET, FILM COATED ORAL DAILY
Status: DISCONTINUED | OUTPATIENT
Start: 2018-06-11 | End: 2018-06-11

## 2018-06-11 RX ORDER — MEPERIDINE HYDROCHLORIDE 25 MG/ML
12.5 INJECTION INTRAMUSCULAR; INTRAVENOUS; SUBCUTANEOUS AS NEEDED
Status: DISCONTINUED | OUTPATIENT
Start: 2018-06-11 | End: 2018-06-11 | Stop reason: HOSPADM

## 2018-06-11 RX ORDER — SODIUM CHLORIDE AND POTASSIUM CHLORIDE .9; .15 G/100ML; G/100ML
75 SOLUTION INTRAVENOUS CONTINUOUS
Status: DISCONTINUED | OUTPATIENT
Start: 2018-06-11 | End: 2018-06-13

## 2018-06-11 RX ORDER — SODIUM CHLORIDE, SODIUM LACTATE, POTASSIUM CHLORIDE, CALCIUM CHLORIDE 600; 310; 30; 20 MG/100ML; MG/100ML; MG/100ML; MG/100ML
INJECTION, SOLUTION INTRAVENOUS CONTINUOUS PRN
Status: DISCONTINUED | OUTPATIENT
Start: 2018-06-11 | End: 2018-06-11 | Stop reason: SURG

## 2018-06-11 RX ADMIN — ONDANSETRON 4 MG: 2 INJECTION INTRAMUSCULAR; INTRAVENOUS at 10:37

## 2018-06-11 RX ADMIN — SODIUM CHLORIDE AND POTASSIUM CHLORIDE 110 ML/HR: .9; .15 SOLUTION INTRAVENOUS at 18:59

## 2018-06-11 RX ADMIN — EPHEDRINE SULFATE 10 MG: 50 INJECTION, SOLUTION INTRAMUSCULAR; INTRAVENOUS; SUBCUTANEOUS at 09:42

## 2018-06-11 RX ADMIN — Medication 2000 MG: at 09:39

## 2018-06-11 RX ADMIN — OXYCODONE HYDROCHLORIDE 5 MG: 5 TABLET ORAL at 19:29

## 2018-06-11 RX ADMIN — FENTANYL CITRATE 25 MCG: 50 INJECTION INTRAMUSCULAR; INTRAVENOUS at 01:53

## 2018-06-11 RX ADMIN — CEFAZOLIN SODIUM 1000 MG: 1 SOLUTION INTRAVENOUS at 19:03

## 2018-06-11 RX ADMIN — ROPIVACAINE HYDROCHLORIDE 20 ML: 5 INJECTION, SOLUTION EPIDURAL; INFILTRATION; PERINEURAL at 08:58

## 2018-06-11 RX ADMIN — SODIUM CHLORIDE AND POTASSIUM CHLORIDE 110 ML/HR: .9; .15 SOLUTION INTRAVENOUS at 04:08

## 2018-06-11 RX ADMIN — SODIUM CHLORIDE, SODIUM LACTATE, POTASSIUM CHLORIDE, AND CALCIUM CHLORIDE: .6; .31; .03; .02 INJECTION, SOLUTION INTRAVENOUS at 09:18

## 2018-06-11 RX ADMIN — PROPOFOL 50 MCG/KG/MIN: 10 INJECTION, EMULSION INTRAVENOUS at 09:27

## 2018-06-11 RX ADMIN — SODIUM CHLORIDE 500 ML: 0.9 INJECTION, SOLUTION INTRAVENOUS at 01:18

## 2018-06-11 RX ADMIN — OXYCODONE HYDROCHLORIDE 5 MG: 5 TABLET ORAL at 04:16

## 2018-06-11 RX ADMIN — ROPIVACAINE HYDROCHLORIDE 20 ML: 2 INJECTION, SOLUTION EPIDURAL; INFILTRATION at 08:58

## 2018-06-11 RX ADMIN — EPHEDRINE SULFATE 10 MG: 50 INJECTION, SOLUTION INTRAMUSCULAR; INTRAVENOUS; SUBCUTANEOUS at 09:29

## 2018-06-11 RX ADMIN — BACITRACIN 1 SMALL APPLICATION: 500 OINTMENT TOPICAL at 00:30

## 2018-06-11 RX ADMIN — PHENYLEPHRINE HYDROCHLORIDE 50 MCG/MIN: 10 INJECTION INTRAVENOUS at 09:49

## 2018-06-11 NOTE — ANESTHESIA PROCEDURE NOTES
Spinal Block    Patient location during procedure: OR  Start time: 6/11/2018 9:18 AM  Reason for block: at surgeon's request and primary anesthetic  Staffing  Anesthesiologist: Henry Bernal  Performed: anesthesiologist   Preanesthetic Checklist  Completed: patient identified, site marked, surgical consent, pre-op evaluation, timeout performed, IV checked, risks and benefits discussed and monitors and equipment checked  Spinal Block  Patient position: left lateral decubitus  Prep: ChloraPrep  Patient monitoring: cardiac monitor, frequent blood pressure checks, continuous pulse ox and heart rate  Approach: midline  Location: L3-4  Injection technique: single-shot  Needle  Needle type: pencil-tip   Needle gauge: 25 G  Needle length: 10 cm  Assessment  Sensory level: T4  Injection Assessment:  negative aspiration for heme, no paresthesia on injection and positive aspiration for clear CSF    Post-procedure:  site cleaned

## 2018-06-11 NOTE — ED PROVIDER NOTES
History  Chief Complaint   Patient presents with    Multiple Falls     first fall @1900 with no c/o, second fall at 2245 - unwittnessed fall, pt found on left side with c/o Right Leg & Hip pain, right leg is noted to be shorter then the left  denises hitting head, no loc, ASA no thinners     81 y/o female presents to the ED for right hip and shoulder pain s/p fall earlier this evening  Patient comes from a NH after she had 2 unwitnessed falls today  She had her first fall around 7pm this evening and had no complaints  She then fell again around 1045pm and complained of right hip and right shoulder pain  Unknown LOC  Takes ASA daily  She denies any other complaints  No headache, neck pain, or focal N/T/W of her extremities  Pain has been constant and is worse with movement  Per records, last tetanus 11/12/14  History provided by:  Patient and nursing home  Fall   Mechanism of injury: fall    Injury location:  Leg  Leg injury location:  R hip  Incident location:  detention  Time since incident:  2 hours  Arrived directly from scene: yes    Fall:     Fall occurred:  Standing    Impact surface:  Hard floor    Point of impact:  Unable to specify  Suspicion of alcohol use: no    Suspicion of drug use: no    Tetanus status:  Unknown  Prior to arrival data:     Bystander interventions:  None    Patient ambulatory at scene: no      Blood loss:  None    Responsiveness at scene:  Alert    Orientation at scene:  Person and place    Airway interventions:  None    Breathing interventions:  None    IV access status:  None    IO access:  None    Fluids administered:  None    Cardiac interventions:  None    Medications administered:  None    Immobilization:  None  Associated symptoms: no abdominal pain, no back pain, no chest pain, no headaches, no nausea, no neck pain and no vomiting    Risk factors: no anticoagulation therapy        Prior to Admission Medications   Prescriptions Last Dose Informant Patient Reported? Taking? amLODIPine (NORVASC) 10 mg tablet 6/10/2018 at Unknown time  No Yes   Sig: Take 1 tablet by mouth daily   aspirin 81 MG tablet 6/10/2018 at Unknown time  Yes Yes   Sig: Take 81 mg by mouth 2 (two) times a week  cholecalciferol (VITAMIN D3) 1,000 units tablet 6/10/2018 at Unknown time  Yes Yes   Sig: Take 1,000 Units by mouth 2 (two) times a day     cyanocobalamin (VITAMIN B-12) 1,000 mcg tablet 6/10/2018 at Unknown time Outside Facility ([address] [place] [address]) Yes Yes   Sig: Take 1,000 mcg by mouth daily   ergocalciferol (VITAMIN D2) 50,000 units   Yes No   Sig: Take 50,000 Units by mouth every 28 days  lisinopril (ZESTRIL) 40 mg tablet 6/10/2018 at Unknown time  No Yes   Sig: Take 1 tablet by mouth daily   metoprolol tartrate (LOPRESSOR) 50 mg tablet   Yes No   Sig: Take 50 mg by mouth 2 (two) times a day  Facility-Administered Medications: None       Past Medical History:   Diagnosis Date    History of shingles 2013    Hypertension     Osteoarthritis        Past Surgical History:   Procedure Laterality Date    APPENDECTOMY      CHOLECYSTECTOMY      HYSTERECTOMY      TONSILECTOMY AND ADNOIDECTOMY      VARICOSE VEIN SURGERY         History reviewed  No pertinent family history  I have reviewed and agree with the history as documented  Social History   Substance Use Topics    Smoking status: Never Smoker    Smokeless tobacco: Never Used    Alcohol use No        Review of Systems   Constitutional: Negative for chills and fever  HENT: Negative for congestion, ear pain and sore throat  Eyes: Negative for pain and visual disturbance  Respiratory: Negative for cough, shortness of breath and wheezing  Cardiovascular: Negative for chest pain and leg swelling  Gastrointestinal: Negative for abdominal pain, diarrhea, nausea and vomiting  Genitourinary: Negative for dysuria, frequency, hematuria and urgency  Musculoskeletal: Negative for back pain, neck pain and neck stiffness     Skin: Positive for wound  Negative for rash  Neurological: Negative for weakness, numbness and headaches  Psychiatric/Behavioral: Negative for agitation and confusion  All other systems reviewed and are negative  Physical Exam  ED Triage Vitals [06/10/18 2342]   Temperature Pulse Respirations Blood Pressure SpO2   97 5 °F (36 4 °C) 92 17 110/58 96 %      Temp Source Heart Rate Source Patient Position - Orthostatic VS BP Location FiO2 (%)   Oral Monitor Lying Left arm --      Pain Score       6           Orthostatic Vital Signs  Vitals:    06/10/18 2342 06/11/18 0320   BP: 110/58 152/70   Pulse: 92 95   Patient Position - Orthostatic VS: Lying Sitting       Physical Exam   Constitutional: She is oriented to person, place, and time  She appears well-developed and well-nourished  HENT:   Head: Normocephalic and atraumatic  Mouth/Throat: Mucous membranes are dry  Eyes: EOM are normal  Pupils are equal, round, and reactive to light  Neck: Normal range of motion  Neck supple  No midline C,T, or L spine tenderness    Cardiovascular: Normal rate and regular rhythm  Pulmonary/Chest: Effort normal and breath sounds normal  No respiratory distress  She has no wheezes  She has no rales  She exhibits no tenderness  Abdominal: Soft  Bowel sounds are normal  She exhibits no distension  There is no tenderness  Musculoskeletal:   RLE- shortened and externally rotated  Tenderness noted to the right hip  NV intact distally  Unable to test ROM 2/2 pain  RUE- no bony tenderness  FROM without any pain  NV intact distally    Neurological: She is alert and oriented to person, place, and time  No focal deficits   Skin: Skin is warm and dry  Nursing note and vitals reviewed        ED Medications  Medications   sodium chloride (PF) 0 9 % injection 3 mL (not administered)   ceFAZolin (ANCEF) 2 G in sodium chloride 0 9% 50 ml IVPB (CMPD ORDER) (0 mg Intravenous Hold 6/11/18 0312)   amLODIPine (NORVASC) tablet 10 mg (not administered)   cholecalciferol (VITAMIN D3) tablet 1,000 Units (not administered)   cyanocobalamin (VITAMIN B-12) tablet 1,000 mcg (not administered)   lisinopril (ZESTRIL) tablet 40 mg (not administered)   acetaminophen (TYLENOL) tablet 975 mg (not administered)   HYDROmorphone (DILAUDID) injection 0 2 mg (not administered)   oxyCODONE (ROXICODONE) IR tablet 2 5 mg (not administered)   oxyCODONE (ROXICODONE) IR tablet 5 mg (not administered)   ondansetron (ZOFRAN) injection 4 mg (not administered)   aspirin chewable tablet 81 mg (not administered)   sodium chloride 0 9 % with KCl 20 mEq/L infusion (premix) (not administered)   fentanyl citrate (PF) 100 MCG/2ML 25 mcg (25 mcg Intravenous Given 6/11/18 0153)   bacitracin topical ointment 1 small application (1 small application Topical Given 6/11/18 0030)   sodium chloride 0 9 % bolus 500 mL (0 mL Intravenous Stopped 6/11/18 0220)       Diagnostic Studies  Results Reviewed     Procedure Component Value Units Date/Time    Protime-INR [[id number]]  (Normal) Collected:  06/11/18 0118    Lab Status:  Final result Specimen:  Blood from Arm, Left Updated:  06/11/18 0136     Protime 13 0 seconds      INR 0 97    APTT [[id number]]  (Normal) Collected:  06/11/18 0118    Lab Status:  Final result Specimen:  Blood from Arm, Left Updated:  06/11/18 0136     PTT 29 seconds     POCT urinalysis dipstick [[id number]]     Lab Status:  No result Specimen:  Urine     CBC and differential [[id number]]  (Abnormal) Collected:  06/11/18 0005    Lab Status:  Final result Specimen:  Blood from Arm, Left Updated:  06/11/18 0121     WBC 9 43 Thousand/uL      RBC 3 31 (L) Million/uL      Hemoglobin 10 4 (L) g/dL      Hematocrit 31 9 (L) %      MCV 96 fL      MCH 31 4 pg      MCHC 32 6 g/dL      RDW 12 7 %      MPV 10 2 fL      Platelets 878 Thousands/uL      nRBC 0 /100 WBCs      Neutrophils Relative 80 (H) %      Immat GRANS % 1 %      Lymphocytes Relative 11 (L) %      Monocytes Relative 7 % Eosinophils Relative 0 %      Basophils Relative 1 %      Neutrophils Absolute 7 63 (H) Thousands/µL      Immature Grans Absolute 0 12 Thousand/uL      Lymphocytes Absolute 1 00 Thousands/µL      Monocytes Absolute 0 63 Thousand/µL      Eosinophils Absolute 0 00 Thousand/µL      Basophils Absolute 0 05 Thousands/µL     Troponin I [[id number]]  (Normal) Collected:  06/11/18 0005    Lab Status:  Final result Specimen:  Blood from Arm, Left Updated:  06/11/18 0044     Troponin I <0 02 ng/mL     Narrative:         [employer] Chemistry analyzer 99% cutoff is > 0 04 ng/mL in network labs    o cTnI 99% cutoff is useful only when applied to patients in the clinical setting of myocardial ischemia  o cTnI 99% cutoff should be interpreted in the context of clinical history, ECG findings and possibly cardiac imaging to establish correct diagnosis  o cTnI 99% cutoff may be suggestive but clearly not indicative of a coronary event without the clinical setting of myocardial ischemia  Basic metabolic panel [[id number]]  (Abnormal) Collected:  06/11/18 0005    Lab Status:  Final result Specimen:  Blood from Arm, Left Updated:  06/11/18 0040     Sodium 131 (L) mmol/L      Potassium 3 6 mmol/L      Chloride 97 (L) mmol/L      CO2 24 mmol/L      Anion Gap 10 mmol/L      BUN 10 mg/dL      Creatinine 0 69 mg/dL      Glucose 137 mg/dL      Calcium 8 5 mg/dL      eGFR 73 ml/min/1 73sq m     Narrative:         National Kidney Disease Education Program recommendations are as follows:  GFR calculation is accurate only with a steady state creatinine  Chronic Kidney disease less than 60 ml/min/1 73 sq  meters  Kidney failure less than 15 ml/min/1 73 sq  meters  CT head wo contrast   Final Result by Sarai Márquez MD (06/11 3516)      No intracranial hemorrhage or calvarial fracture                    Workstation performed: [id number]         CT spine cervical wo contrast   Final Result by Sarai Márquez MD (06/11 0811)      No cervical spine fracture or traumatic malalignment  Workstation performed: [id number]         XR shoulder 2+ views RIGHT   ED Interpretation by Jude Belcher DO (06/11 0040)   NAP      XR chest 1 view   ED Interpretation by Jude Belcher DO (06/11 0040)   NAP       by Cornelius Grande (06/11 0021)      XR hip/pelv 2-3 vws right if performed   ED Interpretation by Jude Belcher DO (06/11 0040)   Complete R Femoral neck fracture       XR knee 1 or 2 vw right    (Results Pending)   XR hip/pelv 1 vw right if performed    (Results Pending)   XR tibia fibula 2 vw left    (Results Pending)         Procedures  ECG 12 Lead Documentation  Date/Time: 6/11/2018 1:42 AM  Performed by: Patience Pradhan  Authorized by: Patience Pradhan     Indications / Diagnosis:  Pre-op  Patient location:  ED  Previous ECG:     Previous ECG:  Compared to current    Comparison ECG info:  7/13/12  Rate:     ECG rate:  97    ECG rate assessment: normal    Rhythm:     Rhythm: sinus rhythm and A-V block      Rhythm comment:  1st degree AV block  Ectopy:     Ectopy: none    QRS:     QRS axis:  Normal    QRS intervals:  Normal  ST segments:     ST segments: no acute changes   T waves:     T waves comment:  No acute changes           Phone Consults  ED Phone Contact    ED Course  ED Course as of Jun 11 0356 Mon Jun 11, 2018   0106 Ortho paged     6218 Spoke with Alexx Anglin from ortho- recommends admission to medicine  States that he will come in to see patient  Identification of Seniors at Risk      Most Recent Value   (ISAR) Identification of Seniors at Risk   Before the illness or injury that brought you to the Emergency, did you need someone to help you on a regular basis? 1 Filed at: 06/10/2018 8986   In the last 24 hours, have you needed more help than usual?  0 Filed at: 06/10/2018 2345   Have you been hospitalized for one or more nights during the past 6 months?   1 Filed at: 06/10/2018 [phone]   In general, do you see well? 1 Filed at: 06/10/2018 2344   In general, do you have serious problems with your memory? 1 Filed at: 06/10/2018 2345   Do you take more than three different medications every day? 1 Filed at: 06/10/2018 2345   ISAR Score  5 Filed at: 06/10/2018 2345                          [facility]  Number of Diagnoses or Management Options  Closed fracture of neck of right femur, initial encounter [facility]): new and requires workup  Dehydration: new and requires workup  Fall: new and requires workup  Right shoulder pain: new and requires workup  Diagnosis management comments: Patient with right hip and shoulder pain s/p fall  Will get labs, CT head, CT c-spine, and xrays  Patient reevaluated and feels improved  Patient updated on results of tests and plan of care including admission to hospital for further evaluation of presenting complaint  Patient demonstrates verbal understanding and agrees with plan  Report to Dr Jarett Garcia  with [facility] for continuation of patient care          Amount and/or Complexity of Data Reviewed  Clinical lab tests: ordered and reviewed  Tests in the radiology section of CPT®: ordered and reviewed  Tests in the medicine section of CPT®: ordered and reviewed  Discussion of test results with the performing providers: yes  Decide to obtain previous medical records or to obtain history from someone other than the patient: yes  Obtain history from someone other than the patient: yes  Review and summarize past medical records: yes  Discuss the patient with other providers: yes  Independent visualization of images, tracings, or specimens: yes    Patient Progress  Patient progress: improved    CritCare Time    Disposition  Final diagnoses:   Closed fracture of neck of right femur, initial encounter ([facility] [address] )   Right shoulder pain   Fall   Dehydration     Time reflects when diagnosis was documented in both MDM as applicable and the Disposition within this note     Time User Action Codes Description Comment    6/11/2018  1:35 AM Cindy Rizviy A Add [S72 001A] [facility]     6/11/2018  1:35 AM Cindy Rizviy A Remove [S72 001A] Closed displaced fracture of right femoral neck ([facility] )     6/11/2018  1:35 AM PtaCindy qureshiy A Add [S72 001A] Closed fracture of neck of right femur, initial encounter ([place] [address] )     6/11/2018  1:35 AM Cindy Rizviy A Add [M25 [id number]] Right shoulder pain     6/11/2018  1:35 AM Alyce Rizviine Kyliey A Add [H40  XXXA] Fall     6/11/2018  1:35 AM Ptatitus, Cindy Hefty A Add [E86 0] Dehydration       ED Disposition     ED Disposition Condition Comment    Admit  Case was discussed with [facility] and the patient's admission status was agreed to be Admission Status: inpatient status to the service of Dr Gisele Del Valle          Follow-up Information    None         Current Discharge Medication List      CONTINUE these medications which have NOT CHANGED    Details   amLODIPine (NORVASC) 10 mg tablet Take 1 tablet by mouth daily  Refills: 0      aspirin 81 MG tablet Take 81 mg by mouth 2 (two) times a week  cholecalciferol (VITAMIN D3) 1,000 units tablet Take 1,000 Units by mouth 2 (two) times a day        cyanocobalamin (VITAMIN B-12) 1,000 mcg tablet Take 1,000 mcg by mouth daily      lisinopril (ZESTRIL) 40 mg tablet Take 1 tablet by mouth daily  Refills: 0      ergocalciferol (VITAMIN D2) 50,000 units Take 50,000 Units by mouth every 28 days  metoprolol tartrate (LOPRESSOR) 50 mg tablet Take 50 mg by mouth 2 (two) times a day  No discharge procedures on file  ED Provider  Attending physically available and evaluated Ethan Espinoza I managed the patient along with the ED Attending      Electronically Signed by         Erick Ann DO  06/11/18 6870

## 2018-06-11 NOTE — ANESTHESIA PREPROCEDURE EVALUATION
Review of Systems/Medical History  Patient summary reviewed        Cardiovascular  Negative cardio ROS Exercise tolerance (METS): >4,  Hypertension ,    Pulmonary  Negative pulmonary ROS        GI/Hepatic  Negative GI/hepatic ROS          Negative  ROS        Endo/Other  Negative endo/other ROS      GYN  Negative gynecology ROS          Hematology  Negative hematology ROS      Musculoskeletal  Negative musculoskeletal ROS   Arthritis     Neurology  Negative neurology ROS      Psychology   Negative psychology ROS   Dementia           Physical Exam    Airway    Mallampati score: II  TM Distance: >3 FB  Neck ROM: full     Dental       Cardiovascular  Comment: Negative ROS,     Pulmonary      Other Findings        Anesthesia Plan  ASA Score- 3     Anesthesia Type- regional and spinal with ASA Monitors  Additional Monitors:   Airway Plan:     Comment: Patient seen and examined  History reviewed  Patient to be done under spinal anesthesia with sedation, and GA as back-up  Fascia iliaca block for post op pain control  Plan and risks discussed with the patient and POA  Consent obtained        Plan Factors-    Induction- intravenous  Postoperative Plan-     Informed Consent- Anesthetic plan and risks discussed with patient, healthcare power of  and daughter  I personally reviewed this patient with the CRNA  Discussed and agreed on the Anesthesia Plan with the CRNA  Tere Elise

## 2018-06-11 NOTE — PROGRESS NOTES
PRE-OPERATIVE CLEARANCE NOTE    Santiago Elaine is a 80 y o  female with a history of dementia, osteoporosis who presented with closed R femoral neck fracture s/p mechanical fall who is planning to undergo fixation under general by  Orthopedics on 6/11/18  Risks and benefits of surgical procedure were discussed by Orthopedic team with patient's daughter, Jacqueline Mitchell, who is POA, and wishes to proceed with the procedure at this time  ROS:   Chest pain: no   Shortness of breath: no  Shortness of breath with exertion: no  Orthopnea: no    PMH:  CAD: no  HTN: yes  CKD: no  DM: no  History of CVA: no     reports that she has never smoked  She has never used smokeless tobacco  She reports that she does not drink alcohol or use drugs  Lab Results   Component Value Date    CREATININE 0 67 06/11/2018     Vitals:    06/11/18 0753   BP: 124/58   Pulse: 85   Resp: 18   Temp: 98 5 °F (36 9 °C)   SpO2: 94%     Physical Exam   Constitutional: No distress  Eyes: Right eye exhibits no discharge  Left eye exhibits no discharge  Neck: Normal range of motion  Cardiovascular: Normal rate, regular rhythm and intact distal pulses  Murmur heard  Pulmonary/Chest: Effort normal    Abdominal: Soft  Bowel sounds are normal  There is no tenderness  Musculoskeletal:   Protuberant mass L leg, RLE externally rotated and tender to palpation   Neurological: She is alert  Oriented to self   Skin: Skin is warm  Multiple ecchymotic lesions over B/L UE & LE   Vitals reviewed  Recommendations:  Santiago Elaine is undergoing an elective surgical fixation of R femoral neck fracture  Patient's daughter, Latonia Hilliard, has been advised of the reasons for surgery, risks and benefits of surgery vs  non-operative management and patient's daughter wishes to proceed with surgery at this time  Surgery planned by  Orthopedics this morning        ANASTASIA Montano  PGY-1  [place] [address]

## 2018-06-11 NOTE — PROGRESS NOTES
SENIOR History and Physical Note - Arron Alvarez 80 y o  female MRN: [id number]    Unit/Bed#: ED 12 Encounter: [id number]      Assessment and Plan Summary    Arron Alvarez is a 80 y o  female with a significant past medical history of HTN, vitamin D deficiency, dementia, degenerative disease, ambulatory dysfunction, osteoporosis, anemia of chronic disease was brought to ER from nursing home ([facility]) after multiple falls  She had 2 unwitnessed fall today date she had 1 fall around 7:00 p m  and then again at 10:45 p m  since then she is complaining of right hip pain and right shoulder pain  While in room,patient was also complaining of right knee and thigh pain  Vitals:  Blood pressure 110/58, pulse 92, temperature 97 5 °F (36 4 °C), temperature source Oral, resp  rate 17, height 5' 1" (1 549 m), weight 68 9 kg (151 lb 14 4 oz), SpO2 96 %  ,Body mass index is 28 7 kg/m²  PE:  Physical Exam   Constitutional: She appears well-developed and well-nourished  HENT:   Head: Normocephalic  Mouth/Throat: Oropharynx is clear and moist    Raised lesion over left side of her nose, black in color and center  Eyes: EOM are normal    Neck: Neck supple  Cardiovascular: Normal rate and regular rhythm  Murmur heard  Systolic murmer  Pulmonary/Chest: Effort normal and breath sounds normal    Abdominal: Soft  Bowel sounds are normal    Musculoskeletal:   Right leg shortened and externally rotated, tender to palpation over right hip  Raised nontender mass over left lower extremity( no change in mass, patient has this lesion before)   Neurological: She is alert  Oriented to self only   Skin: Skin is warm  Multiple bruises over upper and lower extremities  1 bruise over right lower extremity oozing  Psychiatric: She has a normal mood and affect         Labs:  Hemoglobin 10 4, platelet 019, WBC 6 45, sodium 131, potassium 3 6, creatinine 0 69, GFR 73, troponin 0 02, INR 0 96, PTT 29    Imaging:  CT head no intracranial hemorrhage or fracture  CT cervical spine no fracture or malalignment  X-ray knee, hip, tibia official read pending  Assessment and plan:  42-year-old female admitted for closed fracture of right femur neck  1  Closed fracture of right femur neck:  As per Ortho consult nonweightbearing, discussion with POA tomorrow about operative versus nonoperative management  Preop labs, cephazolin IV, rachel catheter and NPO as per Ortho  Start IV fluids NaCl with KCl 20 at maintenance  FU official reading of her X ray  2  Pain control:  Scheduled Tylenol, p r n  oxycodone 2 5 mg Q 4 for moderate and 5 mg Q 4 severe pain and Dilaudid 0 2 mg Q 4 for breakthrough pain  3  Hypertension:  Controlled with home [id number] mg and lisinopril 40 mg qd  4  Dementia: Patient at baseline  On vitamin B12 1000 mcg  Continue home dose  5  Osteoporosis: Continue Vitamin D 1000 unit BID  6  Anemia of ch dis: at baseline  Continue to monitor  7  Lesion over nose: Consider outpatient referral to dermatology  8  DVT ppx,:  Foot SCDs as patient has oozing bruise on right extremity and lesion over left extremity  Will continue aspirin at this time  Will discuss with attending and patient's POA tomorrow both DVT prophylaxis medications  FU am labs  Continue all other home medications  Discuss with patient's POA, patient is DNR DNI  I agree with Dr Juan Manuel Limon admission for evaluation and management of closed fracture of right femur neck  Please see Dr Ofelia Aparicio and P note for details  Anticipate greater than two midnight stay  I have personally seen and examined patient  I agree with the intern's documentation in the history and physical  Please contact [facility] medicine at [phone] with any questions      Kirit Pierre MD  [facility] [employer] [facility] PGY2  6/11/2018  2:38 AM

## 2018-06-11 NOTE — PROGRESS NOTES
INTERVAL PROGRESS NOTE     Went to bedside to assess patient following orthopedic surgery; patient appears somnolent but is responsive, denies pain at this time  Discussed with Ortho team that surgery went well, which I relayed to patient's daughter, Rachel Key who is at bedside, and is also POA  Also advised Rachel Key that Ortho team will also come discuss with her in greater detail regarding the surgery  Will continue with PRN analgesic regimen      ANASTASIA Pritchard  PGY-1  Darin BLOOM 2

## 2018-06-11 NOTE — CONSULTS
Consultation - April Zaldivar 80 y o  female MRN: [id number]    Unit/Bed#: [facility] 909-01 Encounter: [id number]      Assessment/Plan     Assessment/Plan:  1  Osteoporosis:  Secondary workup has been ordered including thyroid function, PTH, 25 hydroxy vitamin-D, CMP, phosphorus, SPEP, UPEP  Will follow up on these results  Patient will need outpatient follow-up for DEXA scan as well as consideration for medication for osteoporosis  Consults    CC: Own the bone    History of Present Illness     HPI: April Zaldivar is a 80y o  year old female with history of osteoporosis, vitamin-D deficiency, hypertension, ambulatory dysfunction, anemia of chronic disease was admitted after a mechanical fall and hip fracture  She is postop day number 0 after ORIF  Looking at her chart she does have history of osteoporosis with a DEXA scan from a few years ago showing a T-score of -3 7 in lumbar spine, -2 7 in the left hip, -5 0 in forearm  Patient is lethargic after surgery and cannot provide much history  The patient's daughter is at the bedside and states she has not been treated for osteoporosis before to her knowledge  She does take vitamin-D supplement  Review of Systems   Unable to perform ROS: Other       Historical Information   Past Medical History:   Diagnosis Date    History of shingles 2013    Hypertension     Osteoarthritis      Past Surgical History:   Procedure Laterality Date    APPENDECTOMY      CHOLECYSTECTOMY      HYSTERECTOMY      TONSILECTOMY AND ADNOIDECTOMY      VARICOSE VEIN SURGERY       Social History   History   Alcohol Use No     History   Drug Use No     History   Smoking Status    Never Smoker   Smokeless Tobacco    Never Used     Family History: History reviewed  No pertinent family history      Meds/Allergies   Current Facility-Administered Medications   Medication Dose Route Frequency Provider Last Rate Last Dose    acetaminophen (TYLENOL) tablet 975 mg  975 mg Oral Q8H [address] Sameh Celeste Camarena MD        amLODIPine (NORVASC) tablet 10 mg  10 mg Oral Daily Sherice Valdez MD        aspirin chewable tablet 81 mg  81 mg Oral Daily Sherice Valdez MD        ceFAZolin (ANCEF) IVPB (premix) 1,000 mg  1,000 mg Intravenous Q8H Tonny Degroot PA-C        cholecalciferol (VITAMIN D3) tablet 1,000 Units  1,000 Units Oral BID Sherice Valdez MD        cyanocobalamin (VITAMIN B-12) tablet 1,000 mcg  1,000 mcg Oral Daily Sherice Valdez MD        [START ON 6/12/2018] enoxaparin (LOVENOX) subcutaneous injection 40 mg  40 mg Subcutaneous Daily Tonny Degroot PA-C        HYDROmorphone (DILAUDID) injection 0 2 mg  0 2 mg Intravenous Q4H PRN Sherice Valdez MD        lisinopril (ZESTRIL) tablet 40 mg  40 mg Oral Daily Sherice Valdez MD        [facility]dansetron [facility] injection 4 mg  4 mg Intravenous Q4H PRN Sherice Valdez MD        oxyCODONE (ROXICODONE) IR tablet 2 5 mg  2 5 mg Oral Q4H PRN Sherice Valdez MD        oxyCODONE (ROXICODONE) IR tablet 5 mg  5 mg Oral Q4H PRN Sherice Valdez MD   5 mg at 06/11/18 0416    sodium chloride (PF) 0 9 % injection 3 mL  3 mL Intravenous PRN Misti Whelan DO        sodium chloride 0 9 % with KCl 20 mEq/L infusion (premix)  110 mL/hr Intravenous Continuous Sherice Valdez  mL/hr at 06/11/18 0408 110 mL/hr at 06/11/18 0408     No Known Allergies    Objective   Vitals: Blood pressure 120/56, pulse 82, temperature (!) 96 9 °F (36 1 °C), temperature source Axillary, resp  rate 16, height 5' 5" (1 651 m), weight 68 kg (150 lb), SpO2 97 %      Intake/Output Summary (Last 24 hours) at 06/11/18 1336  Last data filed at 06/11/18 1154   Gross per 24 hour   Intake             1860 ml   Output             1525 ml   Net              335 ml     Invasive Devices     Peripheral Intravenous Line            Peripheral IV 06/10/18 Left Antecubital less than 1 day    Peripheral IV 06/11/18 Left Hand less than 1 day    Peripheral IV 06/11/18 Right Antecubital less than 1 day          Drain            Urethral Catheter 16 Fr  less than 1 day                Physical Exam   Constitutional: She is oriented to person, place, and time  She appears well-developed and well-nourished  No distress  HENT:   Head: Normocephalic and atraumatic  Mouth/Throat: No oropharyngeal exudate  Eyes: Conjunctivae and EOM are normal  Pupils are equal, round, and reactive to light  No scleral icterus  Neck: Normal range of motion  Neck supple  No thyromegaly present  Cardiovascular: Normal rate  Pulmonary/Chest: Effort normal and breath sounds normal  No respiratory distress  Abdominal: Soft  Bowel sounds are normal  There is no tenderness  Musculoskeletal: She exhibits no edema  Lymphadenopathy:     She has no cervical adenopathy  Neurological: She is alert and oriented to person, place, and time  Skin: Skin is warm and dry  No rash noted  She is not diaphoretic  Psychiatric: She has a normal mood and affect  Her behavior is normal        The history was obtained from the review of the chart, patient  Lab Results:       Lab Results   Component Value Date    WBC 9 63 06/11/2018    HGB 10 2 (L) 06/11/2018    HCT 32 2 (L) 06/11/2018    MCV 96 06/11/2018     06/11/2018     Lab Results   Component Value Date/Time    BUN 9 06/11/2018 05:03 AM    BUN 13 11/02/2015 01:27 PM     (L) 06/11/2018 05:03 AM     11/02/2015 01:27 PM    K 3 9 06/11/2018 05:03 AM    K 3 7 11/02/2015 01:27 PM    CL 96 (L) 06/11/2018 05:03 AM     11/02/2015 01:27 PM    CO2 26 06/11/2018 05:03 AM    CO2 28 11/02/2015 01:27 PM    CREATININE 0 67 06/11/2018 05:03 AM    CREATININE 0 81 11/02/2015 01:27 PM    AST 18 12/28/2017 01:00 PM    AST 14 11/02/2015 01:27 PM    ALT 13 12/28/2017 01:00 PM    ALT 16 11/02/2015 01:27 PM    ALB 3 9 12/28/2017 01:00 PM    ALB 3 7 11/02/2015 01:27 PM     No results for input(s): CHOL, HDL, LDL, TRIG, VLDL in the last 72 hours    No results found for: Bonnie Bostonmer  No results found for: POCGLU    Imaging Studies: I have personally reviewed pertinent reports  XR hip/pelv 2-3 vws right if performed [[id number]] Collected: 06/11/18 0364   Order Status: Completed Updated: 06/11/18 0826   Narrative:     RIGHT HIP    INDICATION:   Fall, right hip pain  COMPARISON:  4/24/2017    VIEWS:  XR HIP/PELV 2-3 VWS RIGHT W PELVIS IF PERFORMED   Images: 3    FINDINGS: Bony demineralization  Right femoral intertrochanteric fracture with coxa varus deformity  Mild bilateral hip osteoarthritis is seen  No lytic or blastic osseous lesions  There are atherosclerotic calcifications  Soft tissues are otherwise unremarkable  The visualized lumbar spine is unremarkable  Impression:       Displaced right femoral intertrochanteric fracture  As per comments in EPIC, findings are concordant with preliminary interpretation provided by the emergency department

## 2018-06-11 NOTE — DISCHARGE INSTRUCTIONS
Discharge Instructions - Orthopedics  Terrance Azul 80 y o  female MRN: [id number]  Unit/Bed#: PACU 04    Weight Bearing Status:                                           Weight bearing as tolerated right lower extremity    DVT prophylaxis:  Complete course of Lovenox as directed    Pain:  Continue analgesics as directed    Dressing Instructions:   Keep dressing clean, dry and intact until follow up appointment  PT/OT:  Continue PT/OT on outpatient basis as directed    Appt Instructions: If you do not have your appointment, please call the clinic at [phone] to f/u with Dr Kj Tamayo in 2 weeks    Contact the office sooner if you experience any increased numbness/tingling in the extremities  Miscellaneous: Follow up with Endocrine in 4-6 weeks  Call for appt once discharged from hospital       Prevention of Falls and Fractures  Safety first to prevent falls: At any age, people can change their environments to reduce their risk of falling and breaking a bone  Outdoor safety tips: In nasty weather, use a walker or cane for added stability  Wear warm boots with rubber soles for added traction  Look carefully at floor surfaces in public buildings  Many floors are made of highly polished marble or tile that can be very slippery  If floors have plastic or carpet runners in place, stay on them whenever possible  Identify community services that can provide assistance, such as 24-hour pharmacies and grocery stores that take orders over the phone and deliver  It is especially important to use these services in bad weather  Use a shoulder bag, annamaria pack, or backpack to leave hands free  Stop at curbs and check their height before stepping up or down  Be cautious at curbs that have been cut away to allow access for bikes or wheelchairs  The incline up or down may lead to a fall  Indoor safety tips:  Keep all rooms free from clutter, especially the floors  Keep floor surfaces smooth but not slippery  When entering rooms, be aware of differences in floor levels and thresholds  Wear supportive, low-heeled shoes, even at home  Avoid walking around in socks, stockings, or floppy, backless slippers  Check that all carpets and area rugs have skid-proof backing or are tacked to the floor, including carpeting on stairs  Keep electrical and telephone cords and wires out of walkways  Be sure that all stairwells are adequately lit and that stairs have handrails on both sides  Consider placing fluorescent tape on the edges of the top and bottom steps  For optimal safety, install grab bars on bathroom walls beside tubs, showers, and toilets  If you are unstable on your feet, consider using a plastic chair with a back and nonskid leg tips in the shower  Use a rubber bath mat in the shower or tub  Keep a flashlight with fresh batteries beside your bed  Add ceiling fixtures to rooms lit by lamps only, or install lamps that can be turned on by a switch near the entry point into the room  Another option is to install voice- or sound-activated lamps  Use bright light bulbs in your home  If you must use a step-stool for hard-to-reach areas, use a sturdy one with a handrail and wide steps  A better option is to reorganize work and storage areas to minimize the need for stooping or excessive reaching  Consider purchasing a portable phone that you can take with you from room to room  It provides security because you can answer the phone without rushing for it and you can call for help should an accident occur  Dont let prescriptions run low  Always keep at least 1 weeks worth of medications on hand at home  Check prescriptions with your doctor and pharmacist to see if they may be increasing your risk of falling  If you take multiple medications, check with your doctor and pharmacist about possible interactions between the different medications  Arrange with a family member or friend for daily contact   Try to have at least one person who knows where you are  If you live alone, you may wish to contract with a monitoring company that will respond to your call 24 hours a day  Watch yourself in a mirror  Does your body lean or sway back and forth or side to side? People with decreased ability to balance often have a high degree of body sway and are more likely to fall  ([employer] com au  [facility] gov/Health_Info/Bone/Osteoporosis/Fracture/prevent_falls  asp#d)  This patient has been enrolled in Own the Bone program (www ownthebone  org), which is a national post-fracture, systems-based, multidisciplinary fragility fracture prevention initiative program   In order to help with this initiative, please provide the appropriate counseling regarding the following prevention measures at the patient's next office visit:    Regular weight bearing and muscle strengthening  Fall Prevention  Smoking Cessation  Alcohol Consumption  Vitamin D supplementation  Calcium supplementation  Any additional pharmacotherapy recommendations

## 2018-06-11 NOTE — CASE MANAGEMENT
Initial Clinical Review    Admission: Date/Time/Statement: 6/11/18 @ 0139     Orders Placed This Encounter   Procedures    Inpatient Admission     Standing Status:   Standing     Number of Occurrences:   1     Order Specific Question:   Admitting Physician     Answer:   LIDIA PUENTES [[id number]]     Order Specific Question:   Level of Care     Answer:   Med Surg [[id number]]     Order Specific Question:   Estimated length of stay     Answer:   More than 2 Midnights     Order Specific Question:   Certification     Answer:   I certify that inpatient services are medically necessary for this patient for a duration of greater than two midnights  See H&P and MD Progress Notes for additional information about the patient's course of treatment  ED: Date/Time/Mode of Arrival:   ED Arrival Information     Expected Arrival Acuity Means of Arrival Escorted By Service Admission Type    - 6/10/2018 23:35 Emergent Ambulance R [facility] 98 Complaint    Fall          Chief Complaint:   Chief Complaint   Patient presents with    Multiple Falls     first fall @1900 with no c/o, second fall at 2245 - unwittnessed fall, pt found on left side with c/o Right Leg & Hip pain, right leg is noted to be shorter then the left  denises hitting head, no loc, ASA no thinners       History of Illness: 80 y o  female living in [facility] with PMH significant for early onset dementia, Vitamin D deficiency, Osteoporosis who presented with right arm, hip, lateral thigh and knee pain after multiple recent falls  Patient only oriented to person and unable to provide history  History obtained mainly from chart  Found laying on the ground yesterday after most recent unwitnessed fall complaining of ride sided pain as above  Denies LOC, striking head, numbness tingling   Patient reports history of easy bruising and easy bleeding and states multiple bruises and hematoma on lateral left leg present for some time prior to recent falls       ED Vital Signs:   ED Triage Vitals   Temperature Pulse Respirations Blood Pressure SpO2   06/10/18 2342 06/10/18 2342 06/10/18 2342 06/10/18 2342 06/10/18 2342   97 5 °F (36 4 °C) 92 17 110/58 96 %      Temp Source Heart Rate Source Patient Position - Orthostatic VS BP Location FiO2 (%)   06/10/18 2342 06/10/18 2342 06/10/18 2342 06/10/18 2342 06/11/18 1130   Oral Monitor Lying Left arm 3      Pain Score       06/10/18 2342       6        Wt Readings from Last 1 Encounters:   06/11/18 68 kg (150 lb)       Vital Signs (abnormal): WNL    Abnormal Labs:    06/11/18 0503    Sodium 136 - 145 mmol/L 130        06/11/18 0503    WBC 4 31 - 10 16 Thousand/uL 9 63    RBC 3 81 - 5 12 Million/uL 3 37     Hemoglobin 11 5 - 15 4 g/dL 10 2     Hematocrit 34 8 - 46 1 % 32 2       Diagnostic Test Results: Xray Right Hip/ Pelvis - Displaced right femoral intertrochanteric fracture  CT Head - No intracranial hemorrhage or calvarial fracture  ED Treatment:   Medication Administration from 06/10/2018 2334 to 06/11/2018 0308       Date/Time Order Dose Route Action     06/11/2018 0153 fentanyl citrate (PF) 100 MCG/2ML 25 mcg 25 mcg Intravenous Given     06/11/2018 0030 bacitracin topical ointment 1 small application 1 small application Topical Given     06/11/2018 0118 sodium chloride 0 9 % bolus 500 mL 500 mL Intravenous New Bag          Past Medical/Surgical History:    Active Ambulatory Problems     Diagnosis Date Noted    Vomiting 12/28/2017    Essential hypertension 12/28/2017    Dementia, early onset 12/28/2017    Distal radius fracture, left 12/31/2017    Skin tear of left elbow without complication 42/68/7604     Resolved Ambulatory Problems     Diagnosis Date Noted    Diarrhea 12/28/2017    Unwitnessed fall 01/02/2018    Hypokalemia 01/03/2018     Past Medical History:   Diagnosis Date    History of shingles 2013    Hypertension     Osteoarthritis        Admitting Diagnosis: Dehydration [E86 0]  Head pain [R51]  Right shoulder pain [M25 511]  Closed fracture of neck of right femur, initial encounter ([facility] [address] ) [S72 001A]    Age/Sex: 80 y o  female    Assessment & Plan:  Radha Osorio is a 80 y o  female PMH significant for dementia, Vitamin D deficiency, Osteoporosis, HTN, ambulatory dysfunction and Anemia of chronic disease, who presented with right arm, hip, lateral thigh and knee pain      1  Closed Fracture of Right Femoral Neck  -Fracture based on preliminary read of R hip Xray (other imaging studies including R tibea/fibula and R Knee XR pending, CT spine and head negative for fractures)  -Ortho consulted, awaiting [facility] decision on whether to proceed with surgery  -NPO, preop labs, IV cefazolin, non-weight bearing, rachel catheter as per Ortho  -Analgesics as per Geriatric Acute Pain Management protocol (Tylenol 975 q8hr, Camryn 2 5 mg q4 PRN for moderate pain and 5 0 mg q4h PRN for severe pain, and dilauded 0 2 mg q4hrs for breakthrough pain)  -NS with KCl at maintenance   -Strict bedrest  -F/u a m labs     2  Benign Essential HTN:  -Well controlled on PTA meds       BP Readings from Last 3 Encounters:   06/11/18 152/70   01/05/18 164/72   12/13/17 148/77   Continue Amlodipine 10 mg q Day, and lisinopril 40 mg q Day     3  Osteoporosis:  -Continue Vitamin D PTA   -Vitamin D deficiency resolved (last Vit D level 34 per patient chart)  -Will consider starting patient on Calcium supplementation      4  Osteoarthritis  -Continue Tylenol scheduled as above     5  Dementia:   -Continue  mcg q Day     Diet:                            NPO due to possible surgical procedure later today 06/11  DVT Prophylaxis:      B/L foot pumps  Code Status:              DNR/DNI (daughter will present living will to be scanned to chart on 06/11)  Dispo: Admit   Patient will require at least two midnight stay           Admission Orders:  NPO; Sips with meds  6/11 OR Today - INSERTION NAIL IM FEMUR ANTEGRADE (TROCHANTERIC) (Right Leg Upper)    Foot Pump  Endocrinology cons    Scheduled Meds:   Current Facility-Administered Medications:  [MAR Hold] acetaminophen 975 mg Oral Q8H [address]   amLODIPine 10 mg Oral Daily   aspirin 81 mg Oral Daily   cholecalciferol 1,000 Units Oral BID   cyanocobalamin 1,000 mcg Oral Daily   lisinopril 40 mg Oral Daily     Continuous Infusions:   sodium chloride 0 9 % with KCl 20 mEq/L 110 mL/hr Last Rate: 110 mL/hr (06/11/18 0408)     PRN Meds:   Oxycodone po x1

## 2018-06-11 NOTE — ANESTHESIA POSTPROCEDURE EVALUATION
Post-Op Assessment Note      CV Status:  Stable    Mental Status:  Alert and awake    Hydration Status:  Euvolemic    PONV Controlled:  Controlled    Airway Patency:  Patent    Post Op Vitals Reviewed: Yes          Staff: CRNA           /67   Temp   97 6   Pulse 72   Resp 10   SpO2 97

## 2018-06-11 NOTE — H&P
H&P   Christian Phan 80 y o  female MRN: [id number]  Unit/Bed#: LUIS Encounter: [id number]    Date of Admission: 6/10/2018 2335  Chief Complaint: Falls    Assessment & Plan:  Christian Phan is a 80 y o  female PMH significant for dementia, Vitamin D deficiency, Osteoporosis, HTN, ambulatory dysfunction and Anemia of chronic disease, who presented with right arm, hip, lateral thigh and knee pain  1  Closed Fracture of Right Femoral Neck  -Fracture based on preliminary read of R hip Xray (other imaging studies including R tibea/fibula and R Knee XR pending, CT spine and head negative for fractures)  -Ortho consulted, awaiting F[facility] Milian's decision on whether to proceed with surgery  -NPO, preop labs, IV cefazolin, non-weight bearing, rachel catheter as per Ortho  -Analgesics as per Geriatric Acute Pain Management protocol (Tylenol 975 q8hr, Camryn 2 5 mg q4 PRN for moderate pain and 5 0 mg q4h PRN for severe pain, and dilauded 0 2 mg q4hrs for breakthrough pain)  -NS with KCl at maintenance   -Strict bedrest  -F/u a m labs    2  Benign Essential HTN:  -Well controlled on PTA meds   BP Readings from Last 3 Encounters:   06/11/18 152/70   01/05/18 164/72   12/13/17 148/77   Continue Amlodipine 10 mg q Day, and lisinopril 40 mg q Day    3  Osteoporosis:  -Continue Vitamin D PTA   -Vitamin D deficiency resolved (last Vit D level 34 per patient chart)  -Will consider starting patient on Calcium supplementation     4  Osteoarthritis  -Continue Tylenol scheduled as above    5  Dementia:   -Continue  mcg q Day      Diet:    NPO due to possible surgical procedure later today 06/11  DVT Prophylaxis:  B/L foot pumps  Code Status:   DNR/DNI (daughter will present living will to be scanned to chart on 06/11)  Dispo: Admit  Patient will require at least two midnight stay       History of Present Illness:  Chief Complaint: Multiple falls  Christian Phan is a 80 y o  female living in [facility] with [address] significant for early onset dementia, Vitamin D deficiency, Osteoporosis who presented with right arm, hip, lateral thigh and knee pain after multiple recent falls  Patient only oriented to person and unable to provide history  History obtained mainly from chart  Found laying on the ground yesterday after most recent unwitnessed fall complaining of ride sided pain as above  Denies LOC, striking head, numbness tingling  Patient reports history of easy bruising and easy bleeding and states multiple bruises and hematoma on lateral left leg present for some time prior to recent falls  ED Management:  Medications    EMS REPLENISHMENT MED ( Does not apply Hold 6/10/18 0305)   sodium chloride (PF) 0 9 % injection 3 mL (not administered)   fentanyl citrate (PF) 100 MCG/2ML 25 mcg (25 mcg Intravenous Given 6/11/18 0153)   bacitracin topical ointment 1 small application (1 small application Topical Given 6/11/18 0030)   sodium chloride 0 9 % bolus 500 mL (0 mL Intravenous Stopped 6/11/18 0220)       Historical Info:  Past Medical History:   Diagnosis Date    History of shingles 2013    Hypertension     Osteoarthritis      Past Surgical History:   Procedure Laterality Date    APPENDECTOMY      CHOLECYSTECTOMY      HYSTERECTOMY      TONSILECTOMY AND ADNOIDECTOMY      VARICOSE VEIN SURGERY       No Known Allergies  Medications PTA:   Prior to Admission Medications   Prescriptions Last Dose Informant Patient Reported? Taking? amLODIPine (NORVASC) 10 mg tablet 6/10/2018 at Unknown time  No Yes   Sig: Take 1 tablet by mouth daily   aspirin 81 MG tablet 6/10/2018 at Unknown time  Yes Yes   Sig: Take 81 mg by mouth 2 (two) times a week     cholecalciferol (VITAMIN D3) 1,000 units tablet 6/10/2018 at Unknown time  Yes Yes   Sig: Take 1,000 Units by mouth 2 (two) times a day     cyanocobalamin (VITAMIN B-12) 1,000 mcg tablet 6/10/2018 at Unknown time Outside Facility (Specify) Yes Yes   Sig: Take 1,000 mcg by mouth daily ergocalciferol (VITAMIN D2) 50,000 units   Yes No   Sig: Take 50,000 Units by mouth every 28 days  lisinopril (ZESTRIL) 40 mg tablet 6/10/2018 at Unknown time  No Yes   Sig: Take 1 tablet by mouth daily   metoprolol tartrate (LOPRESSOR) 50 mg tablet   Yes No   Sig: Take 50 mg by mouth 2 (two) times a day  Facility-Administered Medications: None       Social History:  History   Alcohol Use No     History   Drug Use No     History   Smoking Status    Never Smoker   Smokeless Tobacco    Never Used     Household: Lives in nursing [facility]    History reviewed  No pertinent family history  Review of Systems:    Objective:  Physical Exam:  ED Vitals:  Vitals:    06/10/18 2342   BP: 110/58   TempSrc: Oral   Pulse: 92   Resp: 17   Patient Position - Orthostatic VS: Lying   Temp: 97 5 °F (36 4 °C)     Current Vitals:  HR:  [92] 92  Resp:  [17] 17  BP: (110)/(58) 110/58  SpO2:  [96 %] 96 %  Temp (24hrs), Av 5 °F (36 4 °C), Min:97 5 °F (36 4 °C), Max:97 5 °F (36 4 °C)  Current: Temperature: 97 5 °F (36 4 °C)  Weight: 68 9 kg (151 lb 14 4 oz) Body mass index is 28 7 kg/m²  Intake/Output Summary (Last 24 hours) at 18 0303  Last data filed at 18 0118   Gross per 24 hour   Intake              500 ml   Output                0 ml   Net              500 ml     General: Pleasant  female, oriented to person only  No acute distress     HEENT: NCAT, TM and ear canals normal bilaterally, normal nasal and oral mucosa  Cardio: Regular rate and rhythm, 3/6 CHRISTINE, Ana Rosa S2  Resp: Respiratory effort limited by body habitus, lungs clear to auscultation bilaterally, no wheezes rales or ronchi  Abdomen: Soft, nondistended, non tender to palpation, normal active bowel sounds  Extremities: No LE edema, no cyanosis, tenderness to palpation in right hip, right lateral thigh and right knee  Skin: Warm, dry, 4cm x 4cm hematoma on left lateral leg, abrasion with minimal hemorrhage on right medial leg, multiple bruises on upper and lower extremities  Neuro: CN2-12 grossly intact, sensation grossly intact and symmetrical in all extremities  Psych: Pleasant    Lab Results:   Recent Results (from the past 24 hour(s))   CBC and differential    Collection Time: 06/11/18 12:05 AM   Result Value Ref Range    WBC 9 43 4 31 - 10 16 Thousand/uL    RBC 3 31 (L) 3 81 - 5 12 Million/uL    Hemoglobin 10 4 (L) 11 5 - 15 4 g/dL    Hematocrit 31 9 (L) 34 8 - 46 1 %    MCV 96 82 - 98 fL    MCH 31 4 26 8 - 34 3 pg    MCHC 32 6 31 4 - 37 4 g/dL    RDW 12 7 11 6 - 15 1 %    MPV 10 2 8 9 - 12 7 fL    Platelets 467 423 - 145 Thousands/uL    nRBC 0 /100 WBCs    Neutrophils Relative 80 (H) 43 - 75 %    Immat GRANS % 1 0 - 2 %    Lymphocytes Relative 11 (L) 14 - 44 %    Monocytes Relative 7 4 - 12 %    Eosinophils Relative 0 0 - 6 %    Basophils Relative 1 0 - 1 %    Neutrophils Absolute 7 63 (H) 1 85 - 7 62 Thousands/µL    Immature Grans Absolute 0 12 0 00 - 0 20 Thousand/uL    Lymphocytes Absolute 1 00 0 60 - 4 47 Thousands/µL    Monocytes Absolute 0 63 0 17 - 1 22 Thousand/µL    Eosinophils Absolute 0 00 0 00 - 0 61 Thousand/µL    Basophils Absolute 0 05 0 00 - 0 10 Thousands/µL   Basic metabolic panel    Collection Time: 06/11/18 12:05 AM   Result Value Ref Range    Sodium 131 (L) 136 - 145 mmol/L    Potassium 3 6 3 5 - 5 3 mmol/L    Chloride 97 (L) 100 - 108 mmol/L    CO2 24 21 - 32 mmol/L    Anion Gap 10 4 - 13 mmol/L    BUN 10 5 - 25 mg/dL    Creatinine 0 69 0 60 - 1 30 mg/dL    Glucose 137 65 - 140 mg/dL    Calcium 8 5 8 3 - 10 1 mg/dL    eGFR 73 ml/min/1 73sq m   Troponin I    Collection Time: 06/11/18 12:05 AM   Result Value Ref Range    Troponin I <0 02 <=0 04 ng/mL   Protime-INR    Collection Time: 06/11/18  1:18 AM   Result Value Ref Range    Protime 13 0 11 8 - 14 2 seconds    INR 0 97 0 86 - 1 17   APTT    Collection Time: 06/11/18  1:18 AM   Result Value Ref Range    PTT 29 24 - 36 seconds         Results from last 7 days  Lab Units 06/11/18  0005   SODIUM mmol/L 131*   POTASSIUM mmol/L 3 6   CHLORIDE mmol/L 97*   CO2 mmol/L 24   ANION GAP mmol/L 10   BUN mg/dL 10   CREATININE mg/dL 0 69   EGFR ml/min/1 73sq m 73   GLUCOSE RANDOM mg/dL 137   CALCIUM mg/dL 8 5       Results from last 7 days  Lab Units 06/11/18  0005   WBC Thousand/uL 9 43   HEMOGLOBIN g/dL 10 4*   HEMATOCRIT % 31 9*   PLATELETS Thousands/uL 278   NEUTROS PCT % 80*   MONOS PCT % 7         No results found for: PHOS     Results from last 7 days  Lab Units 06/11/18  0118   INR  0 97   PTT seconds 29           0  Lab Value Date/Time   TROPONINI <0 02 06/11/2018 0005       Urinalysis: No results for input(s): COLORU, CLARITYU, SPECGRAV, PHUR, LEUKOCYTESUR, NITRITE, PROTEINUA, GLUCOSEU, KETONESU, [place] Melody in the last 72 hours  Invalid input(s): Raymond Cheng  Urine Culture: No results found for: URINECX  Blood Culture: No results found for: BLOODCX    Imagining Results:  Ct Head Wo Contrast    Result Date: 6/11/2018  No intracranial hemorrhage or calvarial fracture  Workstation performed: [id number]     Ct Spine Cervical Wo Contrast    Result Date: 6/11/2018  No cervical spine fracture or traumatic malalignment  Workstation performed: [id number]       This case was discussed with [facility] attending physician Dr Geraldine Calderon, and Dr Jovan Barth MD  Family Medicine PGY-1

## 2018-06-11 NOTE — OP NOTE
Brief Op Note  José Morris  MRN: [id number]      Unit/Bed#: Operating Room    PreOp Dx: right Basicervical femur fracture      Postop Dx: right  basicervical femur fracture      Procedure:  Cephalo-medullary Nailing right  basicervical femur fracture      Surgeon: Esau Curling, MD [address], RAJNI ,Phillip Stevens PA-C     No Qualified Resident Available for this Case    Fluids:       EBL:       Drains: none      Specimens: No       Complications: No       Condition: stable transferred to postanesthesia care unit      Implants: Synthes TFN  Femoral nail: 11 x 185  Helical Blade: 727LH   One distal locking screw        80 y  o female, presents at this time, secondary to treatment of right  basicervical femur fracture    The patient was told of all the pros and cons of operative and nonoperative intervention  Some of the complications of operative intervention include infection, bleeding, scarring, nerve injury, vascular injury, malunion, nonunion, continued pain, decreased range of motion, DVT, PE death, loss of limb, need for further surgery, not obtain an results  The patient wished  Patient did consent for operative intervention for this pathology  Patient was brought to the operating room  Patient was anesthetized as anesthesia team  Patient was prepped and draped in normal sterile fashion  After this was done, we did conduct a time out to make sure correct  Patient was in the room, prepped marked and draped  Implants were in the room, Rep  For the implants were present, DVT prophylaxis and antibiotics were dressed  Patient was placed in the fracture table  Reduction was obtained and this was confirmed with C-arm imaging  This was done prior to patient being prepped and draped in normal sterile fashion  Incision was made 2 cm proximal to the greater trochanteric region and this was extended proximally about 4-5 cm   After going through skin and fatty tissue, incision was made through tensor fascia  Guidewire was placed at the greater trochanteric region and advanced past the lesser trochanteric region  This was confirmed on both AP and lateral films  Opening reamer was then used  A guidewire was then passed to the level of the physeal scar of the distal femur  We confirmed appropriate positioning at the distal femur to make sure we did not broach the anterior cortex  At this time, we did wean, with the fracture reduced  Once this was done, placed our true femoral nail  We did make an incision at this time for helical blade  After going through skin, fatty tissue, and fascia candis, were able to place our jig in the appropriate position  Guidewire was then placed at the appropriate position in the femoral head and this was confirmed on both AP and lateral films  We did use a lateral wall reamer and then a step drill  After measuring the appropriate size of our helical blade, a helical blade was then placed and it was confirmed to be in the appropriate position in the femoral head on both AP and lateral films  Perfect circles were obtained at the distal femur for distal locking screws  Appropriate drill was used and then the appropriate screw were placed at the distal static locking screw region  Final x-rays were then obtained and it was noted that our reduction was obtained and maintained with our construct  All wounds were copiously irrigated with normal saline  #1 Vicryl sutures for the fascial layer, 2-0 Vicryl sutures for the subcutaneous layer, and this was reinforced with staples  Wounds were cleaned and dried  Adaptic, 4 x 4, ABDs, Tegaderm was placed   Patient was awakened as anesthesia team, and transported to postanesthesia care unit in stable condition

## 2018-06-11 NOTE — PROGRESS NOTES
After detailed discussion with patient's daughter and Deepthi Batres this morning, decision was made to go through with surgery  Reasons for surgery as well as risks and benefits of surgery versus non-operative management were discussed, and it was agreed upon that surgery would be best thing for patient moving forward

## 2018-06-11 NOTE — CONSULTS
Orthopedics   Jason Solomon 80 y o  female MRN: [id number]  Unit/Bed#: X ray      Chief Complaint:   right hip pain    HPI:   80 y  o female  status post unwitnessed fall complaining of right hip pain and inability to bear weight  Pain is well localized to the hip and is made worse with motion or contact to the area improved somewhat at rest   Patient denies any numbness tingling  Patient has history of dementia and is a poor historian  Per reports, patient had multiple on with his fall at her nursing home earlier this evening was complaining of right hip and groin pain  Per record review, patient takes no blood thinners  Patient believes it is [address]  Review Of Systems:   · Skin: Normal over hip  · Neuro: See HPI  · Musculoskeletal: See HPI  · 14 point review of systems negative except as stated above     Past Medical History:   Past Medical History:   Diagnosis Date    History of shingles 2013    Hypertension     Osteoarthritis        Past Surgical History:   Past Surgical History:   Procedure Laterality Date    APPENDECTOMY      CHOLECYSTECTOMY      HYSTERECTOMY      TONSILECTOMY AND ADNOIDECTOMY      VARICOSE VEIN SURGERY         Family History:  Family history reviewed and non-contributory  History reviewed  No pertinent family history  Social History:  Social History     Social History    Marital status:       Spouse name: N/A    Number of children: N/A    Years of education: N/A     Social History Main Topics    Smoking status: Never Smoker    Smokeless tobacco: Never Used    Alcohol use No    Drug use: No    Sexual activity: Not Asked     Other Topics Concern    None     Social History Narrative    None       Allergies:   No Known Allergies        Labs:    0  Lab Value Date/Time   HCT 31 9 (L) 06/11/2018 0005   HCT 32 6 (L) 01/03/2018 0534   HCT 32 1 (L) 12/31/2017 0542   HCT 40 2 11/02/2015 1327   HCT 40 5 10/08/2014 0852   HCT 40 1 06/04/2014 0904   HGB 10 4 (L) 06/11/2018 0005   HGB 10 9 (L) 01/03/2018 0534   HGB 10 7 (L) 12/31/2017 0542   HGB 13 1 11/02/2015 1327   HGB 12 7 10/08/2014 0852   HGB 12 9 06/04/2014 0904   INR 0 97 06/11/2018 0118   WBC 9 43 06/11/2018 0005   WBC 4 71 01/03/2018 0534   WBC 4 51 12/31/2017 0542   WBC 5 52 11/02/2015 1327   WBC 4 36 10/08/2014 0852   WBC 4 30 (L) 06/04/2014 0904       Meds:    Current Facility-Administered Medications:      EMS REPLENISHMENT MED, , Does not apply, Once, Maribeth Cindi, DO, Stopped at 06/10/18 2353    fentanyl citrate (PF) 100 MCG/2ML 25 mcg, 25 mcg, Intravenous, Once, [employer], DO, Stopped at 06/11/18 0016    Insert peripheral IV, , , Once **AND** sodium chloride (PF) 0 9 % injection 3 mL, 3 mL, Intravenous, PRN, [employer], DO    sodium chloride 0 9 % bolus 500 mL, 500 mL, Intravenous, Once, [employer], DO, Last Rate: 250 mL/hr at 06/11/18 0118, 500 mL at 06/11/18 0118    Current Outpatient Prescriptions:     acetaminophen (TYLENOL) 325 mg tablet, Take 650 mg by mouth every 6 (six) hours as needed for mild pain, Disp: , Rfl:     amLODIPine (NORVASC) 10 mg tablet, Take 1 tablet by mouth daily, Disp: , Rfl: 0    aspirin 81 MG tablet, Take 81 mg by mouth 2 (two) times a week , Disp: , Rfl:     cholecalciferol (VITAMIN D3) 1,000 units tablet, Take 1,000 Units by mouth 2 (two) times a day  , Disp: , Rfl:     cyanocobalamin (VITAMIN B-12) 1,000 mcg tablet, Take 1,000 mcg by mouth daily, Disp: , Rfl:     lisinopril (ZESTRIL) 40 mg tablet, Take 1 tablet by mouth daily, Disp: , Rfl: 0    ergocalciferol (VITAMIN D2) 50,000 units, Take 50,000 Units by mouth every 28 days  , Disp: , Rfl:     metoprolol tartrate (LOPRESSOR) 50 mg tablet, Take 50 mg by mouth 2 (two) times a day , Disp: , Rfl:     Blood Culture:   No results found for: BLOODCX    Wound Culture:   No results found for: WOUNDCULT    Ins and Outs:  I/O last 24 hours:   In: 500 [IV Piggyback:500]  Out: -           Physical Exam: /58 (BP Location: Left arm)   Pulse 92   Temp 97 5 °F (36 4 °C) (Oral)   Resp 17   Ht 5' 1" (1 549 m)   Wt 68 9 kg (151 lb 14 4 oz)   SpO2 96%   BMI 28 70 kg/m²   Gen:  Comfortable, supine in bed, alert oriented x1  HEENT: EOMI, eyes clear, moist mucus membranes, hearing intact  Respiratory: Bilateral chest rise  No audible wheezing found  Cardiovascular: Regular Rate no palpable arrhythmia  Abdomen: soft nontender/nondistended  Musculoskeletal: right lower extremity  · Skin intact  · Right leg shortened externally rotated  · Tender to palpation over hip  · Positive log roll  · Sensation grossly intact in all distributions distally  · Positive ankle dorsi/plantar flexion, EHL/FHL  · 2+ DP pulse      Radiology:   I personally reviewed the films  X-rays of right hip traction view show basicervical right fem neck fracture    _*_*_*_*_*_*_*_*_*_*_*_*_*_*_*_*_*_*_*_*_*_*_*_*_*_*_*_*_*_*_*_*_*_*_*_*_*_*_*_*_*    Assessment:  80 y  o female status post unwitnessed fall with right basicervical femoral neck fracture    Plan:   · Non weight bearing right lower extremity  · Analgesics for pain  · Informed consent obtained  · Pre op labs in ED  · NPO   · Sheikh Catheter insertion  · Medicine admission for all medical management and preoperative risk stratification  · Will discuss with POA concerning operative vs non operative care   · Informed consent obtained for operative fixation from POA, will contact at 8 AM this morning to confirm her desire for surgery vs no surgery  · Dispo: ortho will follow       Jordan Kim MD

## 2018-06-11 NOTE — SOCIAL WORK
MCG Guide Used for Initial Round: Femur Fx, Nail repair  Optimal GLOS: 2 d post op  Hospital Day: 0 days  DC Readiness: Discharge Readiness  Return to top of Femur Fracture, Shaft, Internal Fixation RRG - ISC  · Discharge readiness is indicated by patient meeting Recovery Milestones, including ALL of the following:  ? No evidence of postoperative or surgical site infection  ? Adequate ambulation  ? Oral hydration, medications, and diet  ?  Discharge plans and education understood    Identified Barriers: POD 0  Discussion Date (Time): 06/11/18 with chart review

## 2018-06-11 NOTE — ANESTHESIA PROCEDURE NOTES
Peripheral Block    Patient location during procedure: holding area  Start time: 6/11/2018 8:45 AM  Reason for block: at surgeon's request and post-op pain management  Staffing  Anesthesiologist: Fe Cox  Performed: anesthesiologist   Preanesthetic Checklist  Completed: patient identified, site marked, surgical consent, pre-op evaluation, timeout performed, IV checked, risks and benefits discussed and monitors and equipment checked  Peripheral Block  Patient position: supine  Prep: ChloraPrep  Patient monitoring: continuous pulse ox, frequent blood pressure checks, cardiac monitor and heart rate  Anesthesia block type: fascia iliaca    Laterality: right  Injection technique: single-shot  Procedures: ultrasound guided  Ultrasound permanent image saved  Needle  Needle type: Stimuplex   Needle gauge: 22 G  Needle length: 10 cm  Needle localization: ultrasound guidance  Needle insertion depth: 5 cm  Assessment  Injection assessment: incremental injection, local visualized surrounding nerve on ultrasound, negative aspiration for heme and no paresthesia on injection  Paresthesia pain: none  Heart rate change: no  Slow fractionated injection: yes  Post-procedure:  site cleaned  patient tolerated the procedure well with no immediate complications

## 2018-06-11 NOTE — ED ATTENDING ATTESTATION
Zenia Gil DO, saw and evaluated the patient  I have discussed the patient with the resident/non-physician practitioner and agree with the resident's/non-physician practitioner's findings, Plan of Care, and MDM as documented in the resident's/non-physician practitioner's note, except where noted  All available labs and Radiology studies were reviewed  At this point I agree with the current assessment done in the Emergency Department  I have conducted an independent evaluation of this patient a history and physical is as follows:    79 yo female BIBA from SNF after multiple unwitnessed falls tonight  First fall at 1900h, had no c/o  Next fall at 2145h, c/o R hip pain and shoulder pain  Unknown LOC, no thinners  Denies HA, neck pain/stiffness  Denies focal weakness/numbness/tingling  Pain rated 6/10, constant since onset, non radiating  Worse with attempted movement  Unable to range hip due to pain  R leg shortened, ext rotated  NVI distally  Imp: multiple falls, likely R hip fx   plan: CT head, cspine, R hip films        Critical Care Time  CritCare Time    Procedures

## 2018-06-12 LAB
ERYTHROCYTE [DISTWIDTH] IN BLOOD BY AUTOMATED COUNT: 13.1 % (ref 11.6–15.1)
HCT VFR BLD AUTO: 25.7 % (ref 34.8–46.1)
HGB BLD-MCNC: 8 G/DL (ref 11.5–15.4)
MCH RBC QN AUTO: 30.7 PG (ref 26.8–34.3)
MCHC RBC AUTO-ENTMCNC: 31.1 G/DL (ref 31.4–37.4)
MCV RBC AUTO: 99 FL (ref 82–98)
PHOSPHATE SERPL-MCNC: 3.5 MG/DL (ref 2.3–4.1)
PLATELET # BLD AUTO: 235 THOUSANDS/UL (ref 149–390)
PMV BLD AUTO: 10.6 FL (ref 8.9–12.7)
RBC # BLD AUTO: 2.61 MILLION/UL (ref 3.81–5.12)
T4 FREE SERPL-MCNC: 1.26 NG/DL (ref 0.76–1.46)
WBC # BLD AUTO: 7.88 THOUSAND/UL (ref 4.31–10.16)

## 2018-06-12 PROCEDURE — 84100 ASSAY OF PHOSPHORUS: CPT | Performed by: INTERNAL MEDICINE

## 2018-06-12 PROCEDURE — 99024 POSTOP FOLLOW-UP VISIT: CPT | Performed by: ORTHOPAEDIC SURGERY

## 2018-06-12 PROCEDURE — 84165 PROTEIN E-PHORESIS SERUM: CPT | Performed by: INTERNAL MEDICINE

## 2018-06-12 PROCEDURE — 97167 OT EVAL HIGH COMPLEX 60 MIN: CPT

## 2018-06-12 PROCEDURE — 84165 PROTEIN E-PHORESIS SERUM: CPT | Performed by: PATHOLOGY

## 2018-06-12 PROCEDURE — G8987 SELF CARE CURRENT STATUS: HCPCS

## 2018-06-12 PROCEDURE — G8988 SELF CARE GOAL STATUS: HCPCS

## 2018-06-12 PROCEDURE — G8978 MOBILITY CURRENT STATUS: HCPCS

## 2018-06-12 PROCEDURE — 84166 PROTEIN E-PHORESIS/URINE/CSF: CPT | Performed by: INTERNAL MEDICINE

## 2018-06-12 PROCEDURE — 97163 PT EVAL HIGH COMPLEX 45 MIN: CPT

## 2018-06-12 PROCEDURE — G8979 MOBILITY GOAL STATUS: HCPCS

## 2018-06-12 PROCEDURE — 84439 ASSAY OF FREE THYROXINE: CPT | Performed by: FAMILY MEDICINE

## 2018-06-12 PROCEDURE — 84166 PROTEIN E-PHORESIS/URINE/CSF: CPT | Performed by: PATHOLOGY

## 2018-06-12 PROCEDURE — 99232 SBSQ HOSP IP/OBS MODERATE 35: CPT | Performed by: FAMILY MEDICINE

## 2018-06-12 PROCEDURE — 85027 COMPLETE CBC AUTOMATED: CPT | Performed by: PHYSICIAN ASSISTANT

## 2018-06-12 RX ADMIN — SODIUM CHLORIDE AND POTASSIUM CHLORIDE 110 ML/HR: .9; .15 SOLUTION INTRAVENOUS at 19:01

## 2018-06-12 RX ADMIN — ACETAMINOPHEN 975 MG: 325 TABLET ORAL at 22:44

## 2018-06-12 RX ADMIN — SODIUM CHLORIDE 500 ML: 0.9 INJECTION, SOLUTION INTRAVENOUS at 10:34

## 2018-06-12 RX ADMIN — CHOLECALCIFEROL TAB 25 MCG (1000 UNIT) 1000 UNITS: 25 TAB at 18:56

## 2018-06-12 RX ADMIN — CHOLECALCIFEROL TAB 25 MCG (1000 UNIT) 1000 UNITS: 25 TAB at 08:52

## 2018-06-12 RX ADMIN — ASPIRIN 81 MG 81 MG: 81 TABLET ORAL at 08:52

## 2018-06-12 RX ADMIN — OXYCODONE HYDROCHLORIDE 5 MG: 5 TABLET ORAL at 14:12

## 2018-06-12 RX ADMIN — SODIUM CHLORIDE 500 ML: 0.9 INJECTION, SOLUTION INTRAVENOUS at 16:56

## 2018-06-12 RX ADMIN — CEFAZOLIN SODIUM 1000 MG: 1 SOLUTION INTRAVENOUS at 01:47

## 2018-06-12 RX ADMIN — SODIUM CHLORIDE AND POTASSIUM CHLORIDE 110 ML/HR: .9; .15 SOLUTION INTRAVENOUS at 06:03

## 2018-06-12 RX ADMIN — ACETAMINOPHEN 650 MG: 325 TABLET ORAL at 06:04

## 2018-06-12 RX ADMIN — CYANOCOBALAMIN TAB 500 MCG 1000 MCG: 500 TAB at 08:52

## 2018-06-12 RX ADMIN — ENOXAPARIN SODIUM 40 MG: 100 INJECTION SUBCUTANEOUS at 08:52

## 2018-06-12 NOTE — PHYSICAL THERAPY NOTE
Physical Therapy Evaluation     Patient's Name: Hilary Rae    Admitting Diagnosis  Dehydration [E86 0]  Head pain [R51]  Right shoulder pain [M25 511]  Closed fracture of neck of right femur, initial encounter ([facility] [address] ) [S72 001A]    Problem List  Patient Active Problem List   Diagnosis    Vomiting    Essential hypertension    Dementia, early onset    Distal radius fracture, left    Skin tear of left elbow without complication    Closed fracture of neck of right femur ([facility] [address] )    Osteoporosis       Past Medical History  Past Medical History:   Diagnosis Date    History of shingles 2013    Hypertension     Osteoarthritis        Past Surgical History  Past Surgical History:   Procedure Laterality Date    APPENDECTOMY      CHOLECYSTECTOMY      HYSTERECTOMY      DC OPEN RX FEMUR FX+INTRAMED JOSE Right 6/11/2018    Procedure: INSERTION NAIL IM FEMUR ANTEGRADE (TROCHANTERIC); Surgeon: Gerhardt Fries, MD;  [facility];  Service: Orthopedics    TONSILECTOMY AND ADNOIDECTOMY      VARICOSE VEIN SURGERY        06/12/18 1135   Note Type   Note type Eval/Treat   Pain Assessment   Pain Assessment FLACC   Pain Rating: FLACC (Rest) - Face 0   Pain Rating: FLACC (Rest) - Legs 0   Pain Rating: FLACC (Rest) - Activity 0   Pain Rating: FLACC (Rest) - Cry 0   Pain Rating: FLACC (Rest) - Consolability 0   Score: FLACC (Rest) 0   Pain Rating: FLACC (Activity) - Face 1   Pain Rating: FLACC (Activity) - Legs 1   Pain Rating: FLACC (Activity) - Activity 0   Pain Rating: FLACC (Activity) - Cry 1   Pain Rating: FLACC (Activity) - Consolability 1   Score: FLACC (Activity) 4   Home Living   Type of Home SNF  (HFM)   Home Layout One level   Bathroom Shower/Tub Walk-in shower   Bathroom Toilet Raised   Bathroom Equipment Grab bars in shower;Built-in shower seat;Grab bars around toilet   [address]   Additional Comments Pt is a poor historian   H&P repor patient admitted from NH  CM reports patient from [facility] where she ambualtes with a RW  Prior Function   Level of [place] Independent with ADLs and functional mobility   Lives With Facility staff   Receives Help From Personal care attendant   ADL Assistance Needs assistance   IADLs Needs assistance   Falls in the last 6 months 1 to 4   Restrictions/Precautions   Weight Bearing Precautions Per Order Yes   RLE Weight Bearing Per Order WBAT   Other Precautions Cognitive; Chair Alarm; Bed Alarm;WBS;Fall Risk;Pain;Multiple lines;Telemetry;O2   General   Additional Pertinent History 06/11/18 INSERTION NAIL IM FEMUR ANTEGRADE (TROCHANTERIC)    Family/Caregiver Present No   Cognition   Overall Cognitive Status Impaired   Arousal/Participation Cooperative   Orientation Level Oriented to person   Memory Decreased recall of precautions;Decreased recall of recent events;Decreased short term memory;Decreased recall of biographical information   Following Commands Follows one step commands with increased time or repetition   Comments Pt is overall pleasant and confused  Agreeable to participate in PT session    RUE Assessment   RUE Assessment WFL   LUE Assessment   LUE Assessment WFL   RLE Assessment   RLE Assessment X  (2-/5)   LLE Assessment   LLE Assessment WFL  (3+/5)   Coordination   Movements are Fluid and Coordinated 0   Coordination and Movement Description antalgic; bradykinetic    Sensation X   Light Touch   RLE Light Touch Grossly intact   LLE Light Touch Grossly intact   Proprioception   RLE Proprioception Impaired   LLE Proprioception Impaired   Bed Mobility   Supine to Sit 3  Moderate assistance   Additional items Assist x 2; Increased time required;Verbal cues;LE management   Additional Comments To perform OOB safely, patient required assist from 2 therapists  Increase in pain reported sitting EOB  No dizziness or light headeness  Transfers   Sit to Stand 2  Maximal assistance   Additional items Assist x 1; Increased time required;Verbal cues   Stand to Sit 2  Maximal assistance   Additional items Assist x 1; Increased time required;Verbal cues   Stand pivot 2  Maximal assistance   Additional items Assist x 1; Increased time required;Verbal cues   Ambulation/Elevation   Gait pattern R Foot drag;Improper Weight shift;Decreased foot clearance;Decreased R stance; Short stride; Step to   Gait Assistance 2  Maximal assist   Additional items Assist x 2   Assistive Device (HHA )   Distance 2'   Balance   Static Sitting Poor +   Dynamic Sitting Poor   Static Standing Poor   Dynamic Standing Poor -   Endurance Deficit   Endurance Deficit Yes   Endurance Deficit Description impaired overall quality and tolerance to mobility    Activity Tolerance   Activity Tolerance Patient limited by fatigue;Patient limited by pain;Treatment limited secondary to medical complications (Comment)   Medical Staff Made Aware Emi Lubin; Rachell Almaguer, [address]    Nurse Made Aware appropriate to see   Assessment   Prognosis Guarded   Problem List Decreased strength;Decreased range of motion;Decreased endurance; Impaired balance;Decreased mobility; Decreased coordination;Decreased cognition;Decreased safety awareness;Decreased skin integrity;Orthopedic restrictions;Pain   Assessment Pt is a 80year old female presenting s/p INSERTION NAIL IM FEMUR ANTEGRADE (TROCHANTERIC) performed 6/11/18  PT consulted to assess functional mobility and assist with safe d/c planning  PT eval performed POD #1 with WBAT RLE and up with assistance orders  Pt with an additional problem list/PMH which includes dementia, vitamin D deficiency, OP, HTN, ambulatory dysfunction and anemia  Please see above for a more comprehensive list  PTA, pt living at [facility] where she reports using a RW for ambulation and receives assist with ADLs  Pt is a poor historian 2* h/op dementia   On eval, pt presenting with the following deficits: impaired balance, decreased strength and ROM, pain, poor tolerance to activity and decreased overall functional mobility  Pt overall required mod/max Ax1-2 for bed mobility and transfers  PT to continue to follow pt during stay to progress functional mobility (I) and safety  Rehab required at this time  Goals   Patient Goals patient would like to have less pain    STG Expiration Date 06/26/18   Short Term Goal #1 Pt will be Leonidas with rolling R and L for ease with OOB transfer  Pt will be Leonidas for supine<->sit to ease with OOB transfer  Pt will be [place] with sit<->stand to promote (I) with toileting  Pt will be Leonidas for stand pivot transfer to promote OOB  Pt will be modAwith ambualtion of household distances (appx 50') to reduce caregiver burden  Pt will particiapte in HEP consisitng of balance, strength, ROM and coordinaiton tasks to increase activitiy, improve (I) and decrease pain  Treatment Day 0   Plan   Treatment/Interventions Functional transfer training;LE strengthening/ROM; Therapeutic exercise; Endurance training;Cognitive reorientation;Patient/family training;Equipment eval/education; Bed mobility;Gait training;Spoke to nursing;Spoke to case management;OT   PT Frequency Other (Comment)  (3-5x/wk)   Recommendation   Recommendation Post acute IP rehab   Equipment Recommended Other (Comment)  (RW vs w/c )   PT - OK to Discharge Yes  (to rehab)   Additional Comments OOB in chair with alarm activated and all needs within reach    Barthel Index   Feeding 5   Bathing 0   Grooming Score 0   Dressing Score 0   Bladder Score 5   Bowels Score 5   Toilet Use Score 5   Transfers (Bed/Chair) Score 5   Mobility (Level Surface) Score 0   Stairs Score 0   Barthel Index Score 25         Rocio De La Vega, PT

## 2018-06-12 NOTE — PLAN OF CARE
Problem: PHYSICAL THERAPY ADULT  Goal: Performs mobility at highest level of function for planned discharge setting  See evaluation for individualized goals  Treatment/Interventions: Functional transfer training, LE strengthening/ROM, Therapeutic exercise, Endurance training, Cognitive reorientation, Patient/family training, Equipment eval/education, Bed mobility, Gait training, Spoke to nursing, Spoke to case management, OT  Equipment Recommended: Other (Comment) (RW vs w/c )       See flowsheet documentation for full assessment, interventions and recommendations  Prognosis: Guarded  Problem List: Decreased strength, Decreased range of motion, Decreased endurance, Impaired balance, Decreased mobility, Decreased coordination, Decreased cognition, Decreased safety awareness, Decreased skin integrity, Orthopedic restrictions, Pain  Assessment: Pt is a 80year old female presenting s/p INSERTION NAIL IM FEMUR ANTEGRADE (TROCHANTERIC) performed 6/11/18  PT consulted to assess functional mobility and assist with safe d/c planning  PT eval performed POD #1 with WBAT RLE and up with assistance orders  Pt with an additional problem list/PMH which includes dementia, vitamin D deficiency, OP, HTN, ambulatory dysfunction and anemia  Please see above for a more comprehensive list  PTA, pt living at [facility] where she reports using a RW for ambulation and receives assist with ADLs  Pt is a poor historian 2* h/op dementia  On eval, pt presenting with the following deficits: impaired balance, decreased strength and ROM, pain, poor tolerance to activity and decreased overall functional mobility  Pt overall required mod/max Ax1-2 for bed mobility and transfers  PT to continue to follow pt during stay to progress functional mobility (I) and safety  Rehab required at this time  Recommendation: (S) Post acute IP rehab     PT - OK to Discharge: Yes (to rehab)    See flowsheet documentation for full assessment

## 2018-06-12 NOTE — PROGRESS NOTES
Reviewed labs that have returned:  Low TSH: Suspect slightly low in setting of nonthyroidal illness  Will check TSH and free T4 tomorrow but will also suggest repeat TSH and free t4 in about 6 weeks as outpatient  HyperPTH: Calcium normal/low so suspect secondary in nature  Will need consideration for further work up as outpatient  Check Mg tomorrow  Osteoporosis: SPEP UPEP pending  Will need outpatient follow up for consideration for further work up, DXA, treatment  Would not use forteo or other anabolic agents in setting of hyperPTH      Recent Results (from the past 24 hour(s))   Comprehensive metabolic panel    Collection Time: 06/11/18  5:34 PM   Result Value Ref Range    Sodium 134 (L) 136 - 145 mmol/L    Potassium 4 4 3 5 - 5 3 mmol/L    Chloride 102 100 - 108 mmol/L    CO2 24 21 - 32 mmol/L    Anion Gap 8 4 - 13 mmol/L    BUN 8 5 - 25 mg/dL    Creatinine 0 60 0 60 - 1 30 mg/dL    Glucose 131 65 - 140 mg/dL    Calcium 8 1 (L) 8 3 - 10 1 mg/dL    AST 52 (H) 5 - 45 U/L    ALT 49 12 - 78 U/L    Alkaline Phosphatase 71 46 - 116 U/L    Total Protein 5 7 (L) 6 4 - 8 2 g/dL    Albumin 2 8 (L) 3 5 - 5 0 g/dL    Total Bilirubin 1 01 (H) 0 20 - 1 00 mg/dL    eGFR 76 ml/min/1 73sq m   PTH, intact    Collection Time: 06/11/18  5:34 PM   Result Value Ref Range     0 (H) 18 4 - 80 1 pg/mL   Sedimentation rate, automated    Collection Time: 06/11/18  5:34 PM   Result Value Ref Range    Sed Rate 23 (H) 0 - 20 mm/hour   TSH, 3rd generation    Collection Time: 06/11/18  5:34 PM   Result Value Ref Range    TSH 3RD GENERATON 0 316 (L) 0 358 - 3 740 uIU/mL   Vitamin D 25 hydroxy    Collection Time: 06/11/18  5:34 PM   Result Value Ref Range    Vit D, 25-Hydroxy 30 3 30 0 - 100 0 ng/mL   Urinalysis with microscopic    Collection Time: 06/11/18  7:33 PM   Result Value Ref Range    Clarity, UA Cloudy     Color, UA Dk Yellow     Specific [place], UA 1 016 1 003 - 1 030    pH, UA 5 0 4 5 - 8 0    Glucose, UA Negative Negative mg/dl    Ketones, UA Negative Negative mg/dl    Blood, UA Negative Negative    Protein, UA Negative Negative mg/dl    Nitrite, UA Negative Negative    Bilirubin, UA Negative Negative    Urobilinogen, UA 0 2 0 2, 1 0 E U /dl E U /dl    Leukocytes, UA Large (A) Negative    WBC, UA 20-30 (A) None Seen, 0-5, 5-55, 5-65 /hpf    RBC, UA None Seen None Seen, 0-5 /hpf    Hyaline Casts, UA 3-5 (A) None Seen /lpf    Bacteria, UA Occasional None Seen, Occasional /hpf    Epithelial Cells None Seen None Seen, Occasional /hpf   CBC    Collection Time: 06/12/18  4:52 AM   Result Value Ref Range    WBC 7 88 4 31 - 10 16 Thousand/uL    RBC 2 61 (L) 3 81 - 5 12 Million/uL    Hemoglobin 8 0 (L) 11 5 - 15 4 g/dL    Hematocrit 25 7 (L) 34 8 - 46 1 %    MCV 99 (H) 82 - 98 fL    MCH 30 7 26 8 - 34 3 pg    MCHC 31 1 (L) 31 4 - 37 4 g/dL    RDW 13 1 11 6 - 15 1 %    Platelets 390 929 - 524 Thousands/uL    MPV 10 6 8 9 - 12 7 fL   Phosphorus    Collection Time: 06/12/18  4:52 AM   Result Value Ref Range    Phosphorus 3 5 2 3 - 4 1 mg/dL

## 2018-06-12 NOTE — SOCIAL WORK
Cm met with patient and daughter at bedside to review role of Cm  Cm spoke with patient's daughter Jacki August [phone] who confirmed that patient was living at [facility] prior to admission  Per daughter, patient uses a RW to ambulate  Patient receives assistance with dressing and bathing while at [facility]  Patient is on 02 and according to daughter this is new to patient  Daughter denies having any inpatient , or substance abuse treatment  Daughter reported that patient had [facility]  services in the past prior to her admission to [facility]  Per daughter, patient was at [facility] prior to her move to [facility]  Per daughter, patient's pharmacy is attached to [facility]   Per patient's daughter she is in agreement with a referral to [facility]

## 2018-06-12 NOTE — OCCUPATIONAL THERAPY NOTE
[address]  Evaluation     Patient Name: Jason Solomon  [id number] Date: 6/12/2018  Problem List  Patient Active Problem List   Diagnosis    Vomiting    Essential hypertension    Dementia, early onset    Distal radius fracture, left    Skin tear of left elbow without complication    Closed fracture of neck of right femur ([address] )    Osteoporosis     Past Medical History  Past Medical History:   Diagnosis Date    History of shingles 2013    Hypertension     Osteoarthritis      Past Surgical History  Past Surgical History:   Procedure Laterality Date    APPENDECTOMY      CHOLECYSTECTOMY      HYSTERECTOMY      MA OPEN RX FEMUR FX+INTRAMED JOSE Right 6/11/2018    Procedure: INSERTION NAIL IM FEMUR ANTEGRADE (TROCHANTERIC); Surgeon: Alexandria Rojas MD;  [facility];  Service: Orthopedics    TONSILECTOMY AND ADNOIDECTOMY      VARICOSE VEIN SURGERY           06/12/18 1134   Note Type   Note type Eval/Treat   Restrictions/Precautions   Weight Bearing Precautions Per Order Yes   RLE Weight Bearing Per Order WBAT   Other Precautions Cognitive; Chair Alarm;WBS;Multiple lines;Telemetry; Fall Risk;Pain  (Chair alarm on at end of therapy session )   Pain Assessment   Pain Assessment FLACC   Pain Score No Pain   Pain Type Acute pain   Pain Location Leg   Pain Orientation Right   Hospital Pain Intervention(s) Ambulation/increased activity;Repositioned   Pain Rating: FLACC (Rest) - Face 0   Pain Rating: FLACC (Rest) - Legs 0   Pain Rating: FLACC (Rest) - Activity 0   Pain Rating: FLACC (Rest) - Cry 0   Pain Rating: FLACC (Rest) - Consolability 0   Score: FLACC (Rest) 0   Pain Rating: FLACC (Activity) - Face 1   Pain Rating: FLACC (Activity) - Legs 1   Pain Rating: FLACC (Activity) - Activity 0   Pain Rating: FLACC (Activity) - Cry 1   Pain Rating: FLACC (Activity) - Consolability 1   Score: FLACC (Activity) 4   Home Living   Type of Home SNF   Home Equipment Walker   Additional Comments Pt presents from [facility] home   Prior Function   Level of [place] Needs assistance with ADLs and functional mobility; Needs assistance with IADLs   Lives With Facility staff   Receives Help From Personal care attendant   ADL Assistance Needs assistance   IADLs Needs assistance   Falls in the last 6 months 1 to 4   Comments Pt reports she only "sometimes" required A for ADLS and reports she used a rw PTA   Lifestyle   Autonomy Anticipate pt may be a poor historian  Pt reports she only "sometimes" needs A for ADLS but has A for all IADLS   Reciprocal Relationships Pt receives A from facility staff   Psychosocial   Psychosocial (WDL) WDL   Subjective   Subjective "Oh my, it just hurts so bad"   ADL   Eating Assistance 5  Supervision/Setup   Grooming Assistance 5  Supervision/Setup   UB Bathing Assistance 4  Minimal Assistance   LB Bathing Assistance 2  Maximal Assistance   UB Dressing Assistance 4  Minimal [address] 1  Total [address]  1  Total Assistance   Bed Mobility   Supine to Sit 3  Moderate assistance   Additional items Assist x 2;HOB elevated; Increased time required;Verbal cues;LE management   Transfers   Sit to Stand 2  Maximal assistance   Additional items Assist x 1; Increased time required;Verbal cues   Stand to Sit 2  Maximal assistance   Additional items Assist x 1; Increased time required;Verbal cues   Stand pivot 2  Maximal assistance   Additional items Assist x 1; Increased time required;Verbal cues   Additional Comments Max A x1 for functional transfers w/ SBA of second person for safety  Balance   Static Sitting Poor +   Dynamic Sitting Poor   Static Standing Poor   Dynamic Standing Poor -   Activity Tolerance   Activity Tolerance Patient limited by fatigue;Patient limited by pain; Other (Comment)  (cognition )   Medical Staff Made Aware PT, CM   Nurse Made Aware yes   RUE Assessment   RUE Assessment WFL   LUE Assessment   LUE Assessment WFL   Hand Function   Gross Motor Coordination Functional   Fine Motor Coordination Functional   Cognition   Overall Cognitive Status Impaired   Arousal/Participation Alert; Responsive; Cooperative   Attention Attends with cues to redirect   Orientation Level Oriented to person;Disoriented to place; Disoriented to time;Disoriented to situation   Memory Decreased recall of precautions;Decreased recall of recent events;Decreased short term memory   Following Commands Follows one step commands with increased time or repetition   Comments Pt w/ a baseline h/o dementia  Upon arrival, pt reporting she was at her home and does admit to living in "a home " When reoriented to place as hospital, pt reports she recalls falling  Pt pleasant but confused  Assessment   Limitation Decreased ADL status; Decreased Safe judgement during ADL;Decreased cognition;Decreased endurance;Decreased self-care trans;Decreased high-level ADLs   Prognosis Good   Assessment Pt is a 81 y/o female seen for OT eval s/p adm to [facility] w/ right Basicervical femur fracture receiving Cephalo-medullary Nailing right  basicervical femur fracture on 6/11/18  Comorbidities include a h/o dementia, frequent falls, osteoporosis, HTN, and shingles  Pt with active OT orders, WBAT R LE and up with assistance orders  Pt presents from [facility]  Pt reports she only "sometimes" required A for ADLS and reports she used a rw PTA  Unclear if pt is an accurate historian  Pt is currently demonstrating the following occupational deficits: min A UB ADLS, max A, LB ADLS, and max A functional transfers  Pt with deficits and limitations in all baseline areas of occupation 2*  significant pain, decreased cognition, decreased orientation, decreased endurance/activity tolerance, decreased functional forward reach, decreased ADL status, impaired balance, decreased mobility status, WBS, and orthopedic restrictions  Pt scored overall 25/100 on the Barthel Index   Based on the aforementioned OT evaluation, functional performance deficits, and assessments, pt has been identified as a high complexity evaluation  Recommend STR upon D/C  Pt to continue to benefit from acute immediate OT services to address the following goals 3-5x/wk to  w/in 7-10 days:   Goals   Patient Goals to have less pain    Plan   Treatment Interventions ADL retraining;Functional transfer training; Endurance training;Patient/family training;Equipment evaluation/education; Compensatory technique education;Continued evaluation; Energy conservation; Activityengagement   Goal Expiration Date 18   OT Frequency 3-5x/wk   Recommendation   OT Discharge Recommendation Short Term Rehab   OT - OK to Discharge Yes  (to STR when medically stable)   Barthel Index   Feeding 5   Bathing 0   Grooming Score 0   Dressing Score 0   Bladder Score 5   Bowels Score 5   Toilet Use Score 5   Transfers (Bed/Chair) Score 5   Mobility (Level Surface) Score 0   Stairs Score 0   Barthel Index Score 25   Modified [place] Scale   Modified Mat Scale 4       GOALS:    1) Pt will improve activity tolerance to G for min 30 min txment sessions  2) Pt will complete ADLs/self care w/ supervision w/ G hyiene/thoroughness w/ min cues fro cog support  3) Pt will complete toileting w/ supervision w/ G hygiene/thoroughness using DME as needed  4) Pt will improve functional transfers on/off all surfaces using DME as needed w/ G balance/safety including toileting w/ min A  5) Pt will improve functional mobility during ADL/IADL/leisure tasks using DME as needed w/ g balance/safety w/ min A  6) Pt will demonstrate G carryover of pt/caregiver education and training as appropriate w/ supervision w/ cues w/ G tolerance  7) Pt will demonstrate 100% carryover of precautions s/p review w/ cues w/ supervision w/ G tolerance/participation t/o functional ADL/IADL/leisure tasks        Jair Nicole MS, OTR/L

## 2018-06-12 NOTE — PLAN OF CARE
Problem: OCCUPATIONAL THERAPY ADULT  Goal: Performs self-care activities at highest level of function for planned discharge setting  See evaluation for individualized goals  Treatment Interventions: ADL retraining, Functional transfer training, Endurance training, Patient/family training, Equipment evaluation/education, Compensatory technique education, Continued evaluation, Energy conservation, Activityengagement          See flowsheet documentation for full assessment, interventions and recommendations  Limitation: Decreased ADL status, Decreased Safe judgement during ADL, Decreased cognition, Decreased endurance, Decreased self-care trans, Decreased high-level ADLs  Prognosis: Good  Assessment: Pt is a 79 y/o female seen for OT eval s/p adm to [facility] w/ right Basicervical femur fracture receiving Cephalo-medullary Nailing right  basicervical femur fracture on 6/11/18  Comorbidities include a h/o dementia, frequent falls, osteoporosis, HTN, and shingles  Pt with active OT orders, WBAT R LE and up with assistance orders  Pt presents from [facility]  Pt reports she only "sometimes" required A for ADLS and reports she used a rw PTA  Unclear if pt is an accurate historian  Pt is currently demonstrating the following occupational deficits: min A UB ADLS, max A, LB ADLS, and max A functional transfers  Pt with deficits and limitations in all baseline areas of occupation 2*  significant pain, decreased cognition, decreased orientation, decreased endurance/activity tolerance, decreased functional forward reach, decreased ADL status, impaired balance, decreased mobility status, WBS, and orthopedic restrictions  Pt scored overall 25/100 on the Barthel Index  Based on the aforementioned OT evaluation, functional performance deficits, and assessments, pt has been identified as a high complexity evaluation  Recommend STR upon D/C   Pt to continue to benefit from acute immediate OT services to address the following goals 3-5x/wk to  w/in 7-10 days:     OT Discharge Recommendation: Short Term Rehab  OT - OK to Discharge: Yes (to STR when medically stable)        Dung Schwarz MS, OTR/L

## 2018-06-12 NOTE — PROGRESS NOTES
Progress Note - Georgie Berry 80 y o  female MRN: [id number]    Unit/Bed#: [facility] 909-01 Encounter: [id number]      Assessment/Plan:    1  R femoral neck fracture, POD# 1 s/p R femoral intramedullary nailing  -Patient denies pain today, appears back to her cognitive baseline of dementia/pleasant disorientation  -Only required Tylenol over the last 24 hours for pain control  -VS remain stable and WNL, right femoral surgical site appears clean, dry, no active bleeding  -Awaiting PT evaluation for recommendations for rehab/placement  -Ortho placed Endocrine consult, awaiting lab results for secondary osteoporosis workup including SPEP, UPEP  -, Phos 3 5, Gregg 8 6, TSH 0 316, vitamin-D 30 3; will order FT4 now  -DEXA scan as outpatient     2  Anemia  -Pre-op Hb 10 2, post-op Hb 8 0  -Patient asymptomatic at this time, no active bleeding observed from site  -No new ecchymotic lesions seen on exam today  -Repeat CBC in a m  to trend hemoglobin    3   Reduced urine output   -Patient on  mL/hr overnight w/ rachel in place post-op  -However, urine output only documented as 150 mL since 0700 today, urine appeared concentrated in chamber  -No suprapubic tenderness appreciated on exam  -Urinalysis showed 20-30 WBC, large leukocytes, occasional bacteria, but no dysuria reported   -Will give 500 mL NS bolus and monitor for increased output, may give a 2nd bolus as indicated, follow urine output  -Patient observed to take water by mouth today, but apparently has poor oral intake at baseline  -BMP in a m  Diet:  Regular  PCP: Darrick  VTE Pharmacologic Prophylaxis: Enoxaparin (Lovenox)  VTE Mechanical Prophylaxis: sequential compression device   Code status: DNR/DNI  Disposition: Pending placement/rehab, stable CBC/BMP, improvement of urine output    Plan discussed with senior resident, Dr Katrinka Cushing, and the attending physician, Dr Nalini Alvarez, on the [facility] Team     Subjective:   Patient was seen and examined at bedside  Remains pleasantly confused at her baseline but denies any pain at this time, says she slept well, is slightly hungry, states she is drinking water  Looking for her glasses  Denies chest pain or shortness of breath, no other complaints  Overnight events: Low urine output overnight (~175 mL per RN) but no other events noted    Current Facility-Administered Medications   Medication Dose Route Frequency    acetaminophen (TYLENOL) tablet 975 mg  975 mg Oral Q8H [facility]    amLODIPine (NORVASC) tablet 10 mg  10 mg Oral Daily    aspirin chewable tablet 81 mg  81 mg Oral Daily    cholecalciferol (VITAMIN D3) tablet 1,000 Units  1,000 Units Oral BID    cyanocobalamin (VITAMIN B-12) tablet 1,000 mcg  1,000 mcg Oral Daily    enoxaparin (LOVENOX) subcutaneous injection 40 mg  40 mg Subcutaneous Daily    HYDROmorphone (DILAUDID) injection 0 2 mg  0 2 mg Intravenous Q4H PRN    lisinopril (ZESTRIL) tablet 40 mg  40 mg Oral Daily    ondansetron (ZOFRAN) injection 4 mg  4 mg Intravenous Q4H PRN    oxyCODONE (ROXICODONE) IR tablet 2 5 mg  2 5 mg Oral Q4H PRN    oxyCODONE (ROXICODONE) IR tablet 5 mg  5 mg Oral Q4H PRN    sodium chloride (PF) 0 9 % injection 3 mL  3 mL Intravenous PRN    sodium chloride 0 9 % with KCl 20 mEq/L infusion (premix)  110 mL/hr Intravenous Continuous     No Known Allergies    Objective:   Vitals:  Vitals:    06/12/18 0017 06/12/18 0032 06/12/18 0700 06/12/18 1100   BP: 90/50 100/52 107/52 110/56   BP Location: Right arm Left arm Left arm Left arm   Pulse: 97  93 95   Resp: 18 18 18   Temp: 98 3 °F (36 8 °C)  98 2 °F (36 8 °C) 98 5 °F (36 9 °C)   TempSrc: Oral  Oral Oral   SpO2: 90% 99% 99% 93%   Weight:       Height:             Intake/Output Summary (Last 24 hours) at 06/12/18 1258  Last data filed at 06/12/18 0900   Gross per 24 hour   Intake           2460 5 ml   Output              475 ml   Net           1985 5 ml       Physical Exam   Constitutional: She appears well-developed   No distress  Eyes: Conjunctivae and EOM are normal  Right eye exhibits no discharge  Left eye exhibits no discharge  Neck: Normal range of motion  Cardiovascular: Normal rate and regular rhythm  Murmur heard  Pulmonary/Chest: No respiratory distress  She has no wheezes  She has no rales  Clear breath sounds grossly   Abdominal: Soft  Bowel sounds are normal  She exhibits no distension  There is no tenderness  Musculoskeletal:   RLE with surgical dressing intact over lateral right thigh, appears clean, dry, intact; no swelling or visible deformity, able to wiggle toes bilateral LE; normal LLE   Neurological:   Oriented to self and place ("hospital" & "[facility]"); moves all extremities   Skin: Skin is warm  Surgical dressing RLE clean, dry, intact, no discharge or bleeding; multiple ecchymotic lesions over bilateral upper extremities and lower extremities but unchanged from previous exam; dome-like protruding skin mass on left nasal bridge with central eschar, unchanged from previous exam   Psychiatric: Her behavior is normal    Vitals reviewed        Physical Exam   Invasive Devices     Peripheral Intravenous Line            Peripheral IV 06/11/18 Right Antecubital 1 day          Drain            Urethral Catheter 16 Fr  1 day                Labs:   Admission on 06/10/2018   Component Date Value    WBC 06/11/2018 9 43     RBC 06/11/2018 3 31*    Hemoglobin 06/11/2018 10 4*    Hematocrit 06/11/2018 31 9*    MCV 06/11/2018 96     MCH 06/11/2018 31 4     MCHC 06/11/2018 32 6     RDW 06/11/2018 12 7     MPV 06/11/2018 10 2     Platelets 44/36/6428 278     nRBC 06/11/2018 0     Neutrophils Relative 06/11/2018 80*    Immat GRANS % 06/11/2018 1     Lymphocytes Relative 06/11/2018 11*    Monocytes Relative 06/11/2018 7     Eosinophils Relative 06/11/2018 0     Basophils Relative 06/11/2018 1     Neutrophils Absolute 06/11/2018 7 63*    Immature Grans Absolute 06/11/2018 0 12     Lymphocytes Absolute 06/11/2018 1 00     Monocytes Absolute 06/11/2018 0 63     Eosinophils Absolute 06/11/2018 0 00     Basophils Absolute 06/11/2018 0 05     Sodium 06/11/2018 131*    Potassium 06/11/2018 3 6     Chloride 06/11/2018 97*    CO2 06/11/2018 24     Anion Gap 06/11/2018 10     BUN 06/11/2018 10     Creatinine 06/11/2018 0 69     Glucose 06/11/2018 137     Calcium 06/11/2018 8 5     eGFR 06/11/2018 73     Troponin I 06/11/2018 <0 02     Protime 06/11/2018 13 0     INR 06/11/2018 0 97     PTT 06/11/2018 29     ABO Grouping 06/11/2018 A     Rh Factor 06/11/2018 Negative     Antibody Screen 06/11/2018 Negative     Specimen Expiration Date 06/11/2018 [id number]     Sodium 06/11/2018 130*    Potassium 06/11/2018 3 9     Chloride 06/11/2018 96*    CO2 06/11/2018 26     Anion Gap 06/11/2018 8     BUN 06/11/2018 9     Creatinine 06/11/2018 0 67     Glucose 06/11/2018 139     Calcium 06/11/2018 8 6     eGFR 06/11/2018 73     WBC 06/11/2018 9 63     RBC 06/11/2018 3 37*    Hemoglobin 06/11/2018 10 2*    Hematocrit 06/11/2018 32 2*    MCV 06/11/2018 96     MCH 06/11/2018 30 3     MCHC 06/11/2018 31 7     RDW 06/11/2018 12 6     Platelets 29/29/6215 257     MPV 06/11/2018 9 9     Ventricular Rate 06/11/2018 97     Atrial Rate 06/11/2018 97     UT Interval 06/11/2018 262     QRSD Interval 06/11/2018 88     QT Interval 06/11/2018 334     QTC Interval 06/11/2018 424     P Axis 06/11/2018 75     QRS Axis 06/11/2018 39     T Wave Axis 06/11/2018 91     Sodium 06/11/2018 134*    Potassium 06/11/2018 4 4     Chloride 06/11/2018 102     CO2 06/11/2018 24     Anion Gap 06/11/2018 8     BUN 06/11/2018 8     Creatinine 06/11/2018 0 60     Glucose 06/11/2018 131     Calcium 06/11/2018 8 1*    AST 06/11/2018 52*    ALT 06/11/2018 49     Alkaline Phosphatase 06/11/2018 71     Total Protein 06/11/2018 5 7*    Albumin 06/11/2018 2 8*    Total Bilirubin 06/11/2018 1 01*    eGFR 06/11/2018 76     PTH 06/11/2018 103 0*    Sed Rate 06/11/2018 23*    TSH 3RD GENERATON 06/11/2018 0 316*    Vit D, 25-Hydroxy 06/11/2018 30 3     Clarity, UA 06/11/2018 Cloudy     Color, UA 06/11/2018 Dk Yellow     Specific Gravity, UA 06/11/2018 1 016     pH, UA 06/11/2018 5 0     Glucose, UA 06/11/2018 Negative     Ketones, UA 06/11/2018 Negative     Blood, UA 06/11/2018 Negative     Protein, UA 06/11/2018 Negative     Nitrite, UA 06/11/2018 Negative     Bilirubin, UA 06/11/2018 Negative     Urobilinogen, UA 06/11/2018 0 2     Leukocytes, UA 06/11/2018 Large*    WBC, UA 06/11/2018 20-30*    RBC, UA 06/11/2018 None Seen     Hyaline Casts, UA 06/11/2018 3-5*    Bacteria, UA 06/11/2018 Occasional     Epithelial Cells 06/11/2018 None Seen     WBC 06/12/2018 7 88     RBC 06/12/2018 2 61*    Hemoglobin 06/12/2018 8 0*    Hematocrit 06/12/2018 25 7*    MCV 06/12/2018 99*    MCH 06/12/2018 30 7     MCHC 06/12/2018 31 1*    RDW 06/12/2018 13 1     Platelets 24/76/6470 235     MPV 06/12/2018 10 6     Phosphorus 06/12/2018 3 5        Imaging/Other:    XR tibia fibula 2 vw left [[id number]] Collected: 06/11/18 1017   Order Status: Completed Updated: 06/11/18 1019   Narrative:     LEFT TIBIA AND FIBULA    INDICATION:   fracture evaluation  COMPARISON:  None    VIEWS:  XR TIBIA FIBULA 2 VW LEFT   Images: 4    FINDINGS:    There is no acute fracture or dislocation  Osseous structures demineralized   Severe narrowing of the medial joint compartment   Flattening of the medial femoral condyle   Subchondral lucencies, most compatible with osteochondral lesions   Subchondral sclerosis and subchondral cystic changes in   the medial tibial plateau   Osteophytosis of the medial and lateral joint compartments   Narrowing of the patellofemoral space with osteophytosis  No lytic or blastic lesions are seen  Soft tissues are unremarkable  Impression:     Advanced degenerative changes of the knee   No acute osseous abnormality  Workstation performed: [id number]   XR knee 1 or 2 vw right [[id number]] Collected: 06/11/18 1012   Order Status: Completed Updated: 06/11/18 1018   Narrative:     RIGHT KNEE    INDICATION:   fracture evaluation  COMPARISON:  Prior examination April 24, 2017    VIEWS:  XR KNEE 1 OR 2 VW RIGHT   Images: 3    FINDINGS:    Study limited by patient positioning  Osseous structures demineralized   Severe narrowing of the medial and lateral joint compartments, with osteophytosis   Depression of the lateral tibial plateau, similar to the prior study   Lucencies in the lateral femoral condyle, most compatible with   osteochondral lesions  Narrowing of the patellofemoral space with osteophytosis  There is no joint effusion  No acute fracture or osseous destructive lesion  No lytic or blastic lesions are seen  Soft tissues are unremarkable  Impression:     Stable severe degenerative changes of the knee   No acute osseous abnormality  Workstation performed: [id number]   XR hip/pelv 1 vw right if performed [[id number]] Collected: 06/11/18 1018   Order Status: Completed Updated: 06/11/18 1021   Narrative:     RIGHT HIP    INDICATION:   traction view  COMPARISON:  Prior study performed earlier in the morning  VIEWS:  XR HIP/PELV 1 VW RIGHT  W PELVIS IF PERFORMED   Images: 2    FINDINGS:    Improved alignment of the intertrochanteric fracture of the right femur   Remaining osseous structures intact   Degenerative changes of the hip  No lytic or blastic osseous lesions  Soft tissues are unremarkable  The visualized lumbar spine is unremarkable  Impression:     Improved alignment of intertrochanteric fracture           Lane Juarez MD PGY-1   Family Medicine

## 2018-06-13 LAB
ABO GROUP BLD: NORMAL
ANION GAP SERPL CALCULATED.3IONS-SCNC: 7 MMOL/L (ref 4–13)
BLD GP AB SCN SERPL QL: NEGATIVE
BUN SERPL-MCNC: 12 MG/DL (ref 5–25)
CALCIUM SERPL-MCNC: 7.6 MG/DL (ref 8.3–10.1)
CHLORIDE SERPL-SCNC: 106 MMOL/L (ref 100–108)
CO2 SERPL-SCNC: 23 MMOL/L (ref 21–32)
CREAT SERPL-MCNC: 0.58 MG/DL (ref 0.6–1.3)
ERYTHROCYTE [DISTWIDTH] IN BLOOD BY AUTOMATED COUNT: 13 % (ref 11.6–15.1)
GFR SERPL CREATININE-BSD FRML MDRD: 77 ML/MIN/1.73SQ M
GLUCOSE SERPL-MCNC: 103 MG/DL (ref 65–140)
HCT VFR BLD AUTO: 21.6 % (ref 34.8–46.1)
HCT VFR BLD AUTO: 27 % (ref 34.8–46.1)
HGB BLD-MCNC: 7 G/DL (ref 11.5–15.4)
HGB BLD-MCNC: 8.5 G/DL (ref 11.5–15.4)
MAGNESIUM SERPL-MCNC: 2 MG/DL (ref 1.6–2.6)
MCH RBC QN AUTO: 31.7 PG (ref 26.8–34.3)
MCHC RBC AUTO-ENTMCNC: 32.4 G/DL (ref 31.4–37.4)
MCV RBC AUTO: 98 FL (ref 82–98)
PLATELET # BLD AUTO: 155 THOUSANDS/UL (ref 149–390)
PMV BLD AUTO: 10.3 FL (ref 8.9–12.7)
POTASSIUM SERPL-SCNC: 5 MMOL/L (ref 3.5–5.3)
RBC # BLD AUTO: 2.21 MILLION/UL (ref 3.81–5.12)
RH BLD: NEGATIVE
SODIUM SERPL-SCNC: 136 MMOL/L (ref 136–145)
SPECIMEN EXPIRATION DATE: NORMAL
T4 FREE SERPL-MCNC: 1.29 NG/DL (ref 0.76–1.46)
TSH SERPL DL<=0.05 MIU/L-ACNC: 0.73 UIU/ML (ref 0.36–3.74)
WBC # BLD AUTO: 5.25 THOUSAND/UL (ref 4.31–10.16)

## 2018-06-13 PROCEDURE — 86900 BLOOD TYPING SEROLOGIC ABO: CPT | Performed by: FAMILY MEDICINE

## 2018-06-13 PROCEDURE — 99232 SBSQ HOSP IP/OBS MODERATE 35: CPT | Performed by: FAMILY MEDICINE

## 2018-06-13 PROCEDURE — 86850 RBC ANTIBODY SCREEN: CPT | Performed by: FAMILY MEDICINE

## 2018-06-13 PROCEDURE — P9016 RBC LEUKOCYTES REDUCED: HCPCS

## 2018-06-13 PROCEDURE — 30233N1 TRANSFUSION OF NONAUTOLOGOUS RED BLOOD CELLS INTO PERIPHERAL VEIN, PERCUTANEOUS APPROACH: ICD-10-PCS | Performed by: FAMILY MEDICINE

## 2018-06-13 PROCEDURE — 84439 ASSAY OF FREE THYROXINE: CPT | Performed by: INTERNAL MEDICINE

## 2018-06-13 PROCEDURE — 85018 HEMOGLOBIN: CPT | Performed by: FAMILY MEDICINE

## 2018-06-13 PROCEDURE — 83735 ASSAY OF MAGNESIUM: CPT | Performed by: INTERNAL MEDICINE

## 2018-06-13 PROCEDURE — 84443 ASSAY THYROID STIM HORMONE: CPT | Performed by: INTERNAL MEDICINE

## 2018-06-13 PROCEDURE — 86901 BLOOD TYPING SEROLOGIC RH(D): CPT | Performed by: FAMILY MEDICINE

## 2018-06-13 PROCEDURE — 80048 BASIC METABOLIC PNL TOTAL CA: CPT | Performed by: FAMILY MEDICINE

## 2018-06-13 PROCEDURE — 85027 COMPLETE CBC AUTOMATED: CPT | Performed by: PHYSICIAN ASSISTANT

## 2018-06-13 PROCEDURE — 85014 HEMATOCRIT: CPT | Performed by: FAMILY MEDICINE

## 2018-06-13 RX ORDER — SODIUM CHLORIDE 9 MG/ML
75 INJECTION, SOLUTION INTRAVENOUS CONTINUOUS
Status: DISCONTINUED | OUTPATIENT
Start: 2018-06-13 | End: 2018-06-14

## 2018-06-13 RX ADMIN — AMLODIPINE BESYLATE 10 MG: 10 TABLET ORAL at 09:13

## 2018-06-13 RX ADMIN — CYANOCOBALAMIN TAB 500 MCG 1000 MCG: 500 TAB at 09:13

## 2018-06-13 RX ADMIN — CHOLECALCIFEROL TAB 25 MCG (1000 UNIT) 1000 UNITS: 25 TAB at 09:12

## 2018-06-13 RX ADMIN — ACETAMINOPHEN 975 MG: 325 TABLET ORAL at 15:08

## 2018-06-13 RX ADMIN — OXYCODONE HYDROCHLORIDE 5 MG: 5 TABLET ORAL at 09:43

## 2018-06-13 RX ADMIN — SODIUM CHLORIDE AND POTASSIUM CHLORIDE 110 ML/HR: .9; .15 SOLUTION INTRAVENOUS at 04:01

## 2018-06-13 RX ADMIN — LISINOPRIL 40 MG: 20 TABLET ORAL at 09:13

## 2018-06-13 RX ADMIN — CHOLECALCIFEROL TAB 25 MCG (1000 UNIT) 1000 UNITS: 25 TAB at 17:35

## 2018-06-13 RX ADMIN — SODIUM CHLORIDE 75 ML/HR: 0.9 INJECTION, SOLUTION INTRAVENOUS at 09:15

## 2018-06-13 RX ADMIN — ASPIRIN 81 MG 81 MG: 81 TABLET ORAL at 09:12

## 2018-06-13 RX ADMIN — HYDROMORPHONE HYDROCHLORIDE 0.2 MG: 1 INJECTION, SOLUTION INTRAMUSCULAR; INTRAVENOUS; SUBCUTANEOUS at 20:50

## 2018-06-13 RX ADMIN — ENOXAPARIN SODIUM 40 MG: 100 INJECTION SUBCUTANEOUS at 09:11

## 2018-06-13 NOTE — SOCIAL WORK
Cm reviewed patient during care coordination rounds  Patient is anticipated for d/c tomorrow  Cm confirmed that patient will have a bed at [facility] tomorrow  Cm arranged transport for noon tomorrow with [facility] by [employer]  Daniel informed MD suzie and patient's daughter

## 2018-06-13 NOTE — OCCUPATIONAL THERAPY NOTE
Occupational Therapy Treatment Note:    Pt was getting blood transfusion this am  tx cancelled at this time  Will follow as appropriate     April KASIA Ruiz

## 2018-06-13 NOTE — PROGRESS NOTES
Orthopedics   Judy Linder 80 y o  female MRN: [id number]  Unit/Bed#: [facility] 909-01      Subjective:  80 y  o female post operative day 2 right femoral intramedullary nail  Unable to answer questions this AM  No overnight events       Labs:    0  Lab Value Date/Time   HCT 25 7 (L) 06/12/2018 0452   HCT 32 2 (L) 06/11/2018 0503   HCT 31 9 (L) 06/11/2018 0005   HCT 40 2 11/02/2015 1327   HCT 40 5 10/08/2014 0852   HCT 40 1 06/04/2014 0904   HGB 8 0 (L) 06/12/2018 0452   HGB 10 2 (L) 06/11/2018 0503   HGB 10 4 (L) 06/11/2018 0005   HGB 13 1 11/02/2015 1327   HGB 12 7 10/08/2014 0852   HGB 12 9 06/04/2014 0904   INR 0 97 06/11/2018 0118   WBC 7 88 06/12/2018 0452   WBC 9 63 06/11/2018 0503   WBC 9 43 06/11/2018 0005   WBC 5 52 11/02/2015 1327   WBC 4 36 10/08/2014 0852   WBC 4 30 (L) 06/04/2014 0904   ESR 23 (H) 06/11/2018 1734       Meds:    Current Facility-Administered Medications:     acetaminophen (TYLENOL) tablet 975 mg, 975 mg, Oral, Q8H [address], Marivel Arevalo MD, 975 mg at 06/12/18 2244    amLODIPine (NORVASC) tablet 10 mg, 10 mg, Oral, Daily, Marivel Arevalo MD    aspirin chewable tablet 81 mg, 81 mg, Oral, Daily, Marivel Arevalo MD, 81 mg at 06/12/18 1010    cholecalciferol (VITAMIN D3) tablet 1,000 Units, 1,000 Units, Oral, BID, Marivel Arevalo MD, 1,000 Units at 06/12/18 1856    cyanocobalamin (VITAMIN B-12) tablet 1,000 mcg, 1,000 mcg, Oral, Daily, Marivel Arevalo MD, 1,000 mcg at 06/12/18 0852    enoxaparin (LOVENOX) subcutaneous injection 40 mg, 40 mg, Subcutaneous, Daily, Katie Devine PA-C, 40 mg at 06/12/18 0852    HYDROmorphone (DILAUDID) injection 0 2 mg, 0 2 mg, Intravenous, Q4H PRN, Marivel Arevalo MD    lisinopril (ZESTRIL) tablet 40 mg, 40 mg, Oral, Daily, Marivel Arevalo MD    ondansetron [facility]) injection 4 mg, 4 mg, Intravenous, Q4H PRN, Marivel Arevalo MD    oxyCODONE (ROXICODONE) IR tablet 2 5 mg, 2 5 mg, Oral, Q4H PRN, Marivel Arevalo MD    oxyCODONE (ROXICODONE) IR tablet 5 mg, 5 mg, Oral, Q4H PRN, Marivel Arevalo MD, 5 mg at 06/12/18 1412    Insert peripheral IV, , , Once **AND** sodium chloride (PF) 0 9 % injection 3 mL, 3 mL, Intravenous, PRN, [place] Patricio, DO    sodium chloride 0 9 % with KCl 20 mEq/L infusion (premix), 110 mL/hr, Intravenous, Continuous, Toni Keys MD, Last Rate: 110 mL/hr at 06/13/18 0401, 110 mL/hr at 06/13/18 0401    Blood Culture:   No results found for: BLOODCX    Wound Culture:   No results found for: WOUNDCULT    Ins and Outs:  I/O last 24 hours: In: 3786 8 [P O :315; I V :3421 8; IV Piggyback:50]  Out: 1950 [DTSJA:8427]          Physical exam:  Vitals:    06/13/18 0300   BP: 113/56   Pulse: 83   Resp: 18   Temp: 98 3 °F (36 8 °C)   SpO2: 94%     right lower extremity  · Dressing clean dry intact  · Unable to comply with motorsensory exam 2/2 mental status   · Toes WWP    _*_*_*_*_*_*_*_*_*_*_*_*_*_*_*_*_*_*_*_*_*_*_*_*_*_*_*_*_*_*_*_*_*_*_*_*_*_*_*_*_*    Assessment: 80 y  o female post operative day 2 right femur IMN, patient comfortable     Plan:  · Up and out of bed  · Weightbearing as tolerated RLE   · PT/OT  · DVT prophylaxis- chemical and mechanical   · Analgesics  · Medical management per primary team   · Dispo: Ortho will follow  · ABLA- continue to assess and resuscitate as necessary     Magdalena Cloud MD

## 2018-06-13 NOTE — PHYSICAL THERAPY NOTE
PT CANCEL    PATIENT RECEIVED BLOOD TRANSFUSION AT THIS TIME  WILL HOLD PT SESSION UNTIL PATIENT MORE MEDICALLY APPROPRIATE    Clent Olp, PT

## 2018-06-13 NOTE — PROGRESS NOTES
Progress Note  Davy Herrera 80 y o  female MRN: [id number]  Unit/Bed#: [facility] 909-01 Encounter: [id number]      Assessment/Plan:  Patient Active Problem List   Diagnosis    Vomiting    Essential hypertension    Dementia, early onset    Distal radius fracture, left    Skin tear of left elbow without complication    Closed fracture of neck of right femur ([address] )    Osteoporosis    80year-old female status post right femur or intra medullary nail POD#2    1  R femoral neck fracture, POD# 1 s/p R femoral intramedullary nailing  -follow orthopedic recommendations  -PT as tolerated  -encourage IS  -O2 p r n  keep sats above 92%  -decrease fluids to 75 an hour  -encourage p o  intake  -denies any pain at the moment  -scheduled Tylenol for pain, oxycodone and Dilaudid p r n   -Monitor ins and outs, for in place    2  Osteoporosis  -Ortho placed Endocrine consult, awaiting lab results for secondary osteoporosis workup including SPEP, UPEP  -, Phos 3 5, Gregg 8 6, TSH 0 316, vitamin-D 30 3; will order FT4 now  -DEXA scan as outpatient     3  HTN:  -well controlled  Continue amlodipine and 10mg QD and lisinopril 40mg QD     3  Anemia  -Hb 10 2> post-op Hb 8 0>7 0  -will transfuse 1 uPRBC         Diet:  Regular  PCP: Darrick  VTE Pharmacologic Prophylaxis: Enoxaparin (Lovenox)  VTE Mechanical Prophylaxis: sequential compression device   Code status: DNR/DNI  Disposition: Pending placement/rehab, stable CBC/BMP, improvement of urine output       Events Overnight:  Patient seen examined at bedside  No acute events overnight  Denies any chest pain, shortness of breath  Denies any pain  Urine output 1 6 L    OBJECTIVE    Medications:  Scheduled:  Current Facility-Administered Medications:  acetaminophen 975 mg Oral Q8H Francisco John MD    amLODIPine 10 mg Oral Daily Onur Glass MD    aspirin 81 mg Oral Daily Onur Glass MD    cholecalciferol 1,000 Units Oral BID Onur Glass MD    cyanocobalamin 1,000 mcg Oral Daily Dilia Huang MD    enoxaparin 40 mg Subcutaneous Daily Felicita Ibrahim PA-C    HYDROmorphone 0 2 mg Intravenous Q4H PRN Dilia Huang MD    lisinopril 40 mg Oral Daily Dilia Huang MD    ondansetron 4 mg Intravenous Q4H PRN Dilia Huang MD    oxyCODONE 2 5 mg Oral Q4H PRN Dilia Huang MD    oxyCODONE 5 mg Oral Q4H PRN Dilia Huang MD    sodium chloride (PF) 3 mL Intravenous PRN Irma Rizvi DO    sodium chloride 0 9 % with KCl 20 mEq/L 110 mL/hr Intravenous Continuous Dilia Huang MD Last Rate: 110 mL/hr (06/13/18 0401)     Continuous Infusions:  sodium chloride 0 9 % with KCl 20 mEq/L 110 mL/hr Last Rate: 110 mL/hr (06/13/18 0401)     PRN : HYDROmorphone    ondansetron    oxyCODONE    oxyCODONE    Insert peripheral IV **AND** sodium chloride (PF)    Vitals:   /50 (BP Location: Right arm)   Pulse 91   Temp 98 9 °F (37 2 °C) (Axillary)   Resp 18   Ht 5' 5" (1 651 m)   Wt 68 kg (150 lb)   SpO2 94%   BMI 24 96 kg/m²     Physical Exam:   Gen  : Well-appearing, aaO X31, no acute distress  Heart: Regular rate and rhythm, no murmurs, rubs, or gallops, No LE edema b/l,   Lungs: Clear to auscultation bilaterally, no wheezing, rales, or rhonchi, no accessory muscle use, on 2 L nasal cannula  Abdomen: Soft, nontender, nondistended, bowel sounds positive  Extremities:  Right lower extremity:  Dressing over right hip, clean and dry   Warm and well perfused, cap refill less than 2 seconds, non-tender calves    Lab Results:  Recent Results (from the past 24 hour(s))   CBC    Collection Time: 06/13/18  4:34 AM   Result Value Ref Range    WBC 5 25 4 31 - 10 16 Thousand/uL    RBC 2 21 (L) 3 81 - 5 12 Million/uL    Hemoglobin 7 0 (L) 11 5 - 15 4 g/dL    Hematocrit 21 6 (L) 34 8 - 46 1 %    MCV 98 82 - 98 fL    MCH 31 7 26 8 - 34 3 pg    MCHC 32 4 31 4 - 37 4 g/dL    RDW 13 0 11 6 - 15 1 %    Platelets 125 037 - 111 Thousands/uL    MPV 10 3 8 9 - 12 7 fL   TSH, 3rd generation    Collection Time: 06/13/18  4:34 AM Result Value Ref Range    TSH 3RD GENERATON 0 735 0 358 - 3 740 uIU/mL   T4, free    Collection Time: 06/13/18  4:34 AM   Result Value Ref Range    Free T4 1 29 0 76 - 1 46 ng/dL   Magnesium    Collection Time: 06/13/18  4:34 AM   Result Value Ref Range    Magnesium 2 0 1 6 - 2 6 mg/dL   Basic metabolic panel    Collection Time: 06/13/18  4:34 AM   Result Value Ref Range    Sodium 136 136 - 145 mmol/L    Potassium 5 0 3 5 - 5 3 mmol/L    Chloride 106 100 - 108 mmol/L    CO2 23 21 - 32 mmol/L    Anion Gap 7 4 - 13 mmol/L    BUN 12 5 - 25 mg/dL    Creatinine 0 58 (L) 0 60 - 1 30 mg/dL    Glucose 103 65 - 140 mg/dL    Calcium 7 6 (L) 8 3 - 10 1 mg/dL    eGFR 77 ml/min/1 73sq m     Deo Phelps MD, PGY-2  Family Medicine

## 2018-06-14 ENCOUNTER — TRANSITIONAL CARE MANAGEMENT (OUTPATIENT)
Dept: FAMILY MEDICINE CLINIC | Facility: CLINIC | Age: 83
End: 2018-06-14

## 2018-06-14 VITALS
DIASTOLIC BLOOD PRESSURE: 66 MMHG | SYSTOLIC BLOOD PRESSURE: 139 MMHG | OXYGEN SATURATION: 91 % | HEIGHT: 65 IN | HEART RATE: 80 BPM | WEIGHT: 150 LBS | RESPIRATION RATE: 16 BRPM | TEMPERATURE: 98.3 F | BODY MASS INDEX: 24.99 KG/M2

## 2018-06-14 LAB
ABO GROUP BLD BPU: NORMAL
ANION GAP SERPL CALCULATED.3IONS-SCNC: 7 MMOL/L (ref 4–13)
BPU ID: NORMAL
BUN SERPL-MCNC: 8 MG/DL (ref 5–25)
CALCIUM SERPL-MCNC: 8 MG/DL (ref 8.3–10.1)
CHLORIDE SERPL-SCNC: 103 MMOL/L (ref 100–108)
CO2 SERPL-SCNC: 23 MMOL/L (ref 21–32)
CREAT SERPL-MCNC: 0.44 MG/DL (ref 0.6–1.3)
ERYTHROCYTE [DISTWIDTH] IN BLOOD BY AUTOMATED COUNT: 14.1 % (ref 11.6–15.1)
GFR SERPL CREATININE-BSD FRML MDRD: 84 ML/MIN/1.73SQ M
GLUCOSE SERPL-MCNC: 106 MG/DL (ref 65–140)
HCT VFR BLD AUTO: 24.8 % (ref 34.8–46.1)
HGB BLD-MCNC: 8.1 G/DL (ref 11.5–15.4)
MCH RBC QN AUTO: 31 PG (ref 26.8–34.3)
MCHC RBC AUTO-ENTMCNC: 32.7 G/DL (ref 31.4–37.4)
MCV RBC AUTO: 95 FL (ref 82–98)
PLATELET # BLD AUTO: 177 THOUSANDS/UL (ref 149–390)
PMV BLD AUTO: 10.5 FL (ref 8.9–12.7)
POTASSIUM SERPL-SCNC: 4.4 MMOL/L (ref 3.5–5.3)
RBC # BLD AUTO: 2.61 MILLION/UL (ref 3.81–5.12)
SODIUM SERPL-SCNC: 133 MMOL/L (ref 136–145)
UNIT DISPENSE STATUS: NORMAL
UNIT PRODUCT CODE: NORMAL
UNIT RH: NORMAL
WBC # BLD AUTO: 4.98 THOUSAND/UL (ref 4.31–10.16)

## 2018-06-14 PROCEDURE — 85027 COMPLETE CBC AUTOMATED: CPT | Performed by: PHYSICIAN ASSISTANT

## 2018-06-14 PROCEDURE — 99024 POSTOP FOLLOW-UP VISIT: CPT | Performed by: ORTHOPAEDIC SURGERY

## 2018-06-14 PROCEDURE — 80048 BASIC METABOLIC PNL TOTAL CA: CPT | Performed by: FAMILY MEDICINE

## 2018-06-14 PROCEDURE — 99238 HOSP IP/OBS DSCHRG MGMT 30/<: CPT | Performed by: FAMILY MEDICINE

## 2018-06-14 RX ORDER — ACETAMINOPHEN 325 MG/1
650 TABLET ORAL EVERY 6 HOURS PRN
Qty: 30 TABLET | Refills: 0 | Status: SHIPPED | OUTPATIENT
Start: 2018-06-14

## 2018-06-14 RX ORDER — OXYCODONE HYDROCHLORIDE 5 MG/1
2.5 TABLET ORAL EVERY 4 HOURS PRN
Qty: 15 TABLET | Refills: 0 | Status: SHIPPED | OUTPATIENT
Start: 2018-06-14 | End: 2018-06-24

## 2018-06-14 RX ORDER — OXYCODONE HYDROCHLORIDE 5 MG/1
5 TABLET ORAL EVERY 4 HOURS PRN
Qty: 15 TABLET | Refills: 0 | Status: SHIPPED | OUTPATIENT
Start: 2018-06-14 | End: 2018-06-24

## 2018-06-14 RX ADMIN — SODIUM CHLORIDE 75 ML/HR: 0.9 INJECTION, SOLUTION INTRAVENOUS at 06:21

## 2018-06-14 RX ADMIN — ASPIRIN 81 MG 81 MG: 81 TABLET ORAL at 08:37

## 2018-06-14 RX ADMIN — CYANOCOBALAMIN TAB 500 MCG 1000 MCG: 500 TAB at 08:37

## 2018-06-14 RX ADMIN — ACETAMINOPHEN 975 MG: 325 TABLET ORAL at 06:15

## 2018-06-14 RX ADMIN — CHOLECALCIFEROL TAB 25 MCG (1000 UNIT) 1000 UNITS: 25 TAB at 08:38

## 2018-06-14 RX ADMIN — ENOXAPARIN SODIUM 40 MG: 100 INJECTION SUBCUTANEOUS at 08:36

## 2018-06-14 NOTE — PROGRESS NOTES
Was called by nurse for drop in blood pressure on routine vitals check from average of 100's-130/50-70  To 88/44,  Asymptomatic at baseline state of mentation and physical exam per nurse  Pt on IVF at 75ml/hr and net positive of ~3L since admission, net intake of ~1 2L and and u/o of ~675mL last 24hrs)  On physical she was found to be at baseline state of mentation, oriented to person only, pleasant and able to recall her daughter was here earlier today  No acute complaints, and denies any pain  Blood pressure was repeated and was ~90/50 (mainly a drop of 10-20 points in her SBP from basline) and slight tachycardia at 104  All other VS wnl  Complete neurological and physical exam unchanged from previous exam, including normal regular rhythm, no MRG, no signs of fluid overload LCTA b/l  After receiving x1 bolus of 500mL NS, BP improved to 133/60 with a pulse rate of 70, physical exam continued to remain unchanged  Will consider increasing IVF rate to 100ml/hr based on repeat VS, and I/O's  Will continue to monitor patient closely clinically  Strict I/O's ordered

## 2018-06-14 NOTE — DISCHARGE SUMMARY
Discharge Summary - Terrance Liang 80 y o  female MRN: [id number]    [facility]: [id number]    Admission Date: 6/10/2018   Discharge Date: 6/14/2018  Admission status: Inpatient  Admitting Diagnosis: Closed fracture of neck of right femur ([address] ) s/p fall  Secondary Diagnosis: Osteoarthritis, Essential hypertension, Dementia, Hyponatremia     HPI: 80 y o  Female w/ PMH of dementia, HTN, osteoarthritis, & Herpes Zoster, who initially presented from [facility] due to R arm, hip, lateral thigh and knee pain s/p multiple recent falls  Hospital Course:   Patient had multiple radiographs obtained in the ED which showed no acute findings on XR R shoulder, XR R knee, CXR, XR L tibia/fibula, CT C-spine, & CT head; however, XR R hip/pelvis revealed displaced right femoral intertrochanteric fracture with coxa varus deformity, prompting orthopedic surgery consultation  After discussion with daughter, who is POA, decision was made for orthopedic surgical intervention  Patient underwent right femoral intramedullary nailing on 6/11/18 by Dr Kj Tamayo without apparent complication  Postoperative pain was well controlled via PO Tylenol and oxycodone Q4H PRN  She was given aggressive IVF due to reduced UOP, which improved  Patient was observed to have hemoglobin drop from preop Hb 10 2 on 6/11/18 to 24H postop Hb 8 0 on 6/12/18; although patient did not exhibit any overt signs of continued bleeding, new ecchymoses or development of hematoma, continued Hb drop to Hb 7 0 on 6/13/18 prompted transfusion of 1 unit PRBC on 6/13/18 with subsequent Hb 8 5 6/13/18 and stable Hb 8 1 on 6/14/18 prior to discharge  Patient remained stable post-operatively other than an isolated episode of hypotension overnight 6/13/18 with SBP <90, resolved with fluid bolus x1   As PT evaluated patient and recommended rehab after discharge, patient was referred to [facility] for postop rehab and discharged 6/14 as she remained hemodynamically stable with pain adequately controlled on PO analgesics, which were continued upon discharge  Additionally, Orthopedic team consulted Endocrinology during admission, who performed secondary osteoporosis workup on patient, which revealed , Phos 3 5, Gregg 8 6, TSH 0 316, FT4 1 29, vitamin-D 30 3, Mg 2 0; SPEP & UPEP remain pending at time of discharge  Endocrinology recommendation was made for DEXA scan as outpatient  Of note, patient's metabolic panel revealed hyponatremia with sodium 130-136 during hospital stay which was thought to be due to combination of her reportedly baseline fluctuant PO intake in tandem with likely hemodilution due to continuous IVF and multiple NS boluses given during admission  Exam on Day of Discharge:   Vitals:    06/13/18 2333 06/14/18 0615 06/14/18 0700 06/14/18 0745   BP: 133/60  139/66    BP Location: Right arm  Left arm    Pulse: 70  80    Resp: 12  16    Temp:  (!) 32 °F (0 °C) 98 3 °F (36 8 °C)    TempSrc:   Oral    SpO2:   91% 91%   Weight:       Height:         Physical Exam   Constitutional: No distress  HENT:   Head: Normocephalic  Eyes: Right eye exhibits no discharge  Left eye exhibits no discharge  Neck: Normal range of motion  Cardiovascular: Normal rate, regular rhythm and intact distal pulses  Murmur heard  Pulmonary/Chest: Breath sounds normal    Abdominal: Soft  Bowel sounds are normal  There is no tenderness  Musculoskeletal: She exhibits no edema  Moves toes on B/L feet without difficulty    Neurological:   Alert to self, location ("[facility]")   Skin: Skin is warm and dry     Surgical dressing RLE clean, dry, intact, no discharge or bleeding; multiple ecchymotic lesions over B/L UE>LE, unchanged from previous exam; unchanged dome-like skin lesion on left nasal bridge with central eschar   Psychiatric:   Pleasantly confused, cooperative          Significant Findings/Abnormal Results with this admission:  Labs:    Results from last 7 days  Lab Units 06/14/18  0457 06/13/18  1435 06/13/18  0434 06/12/18  0452  06/11/18  0005   WBC Thousand/uL 4 98  --  5 25 7 88  < > 9 43   HEMOGLOBIN g/dL 8 1* 8 5* 7 0* 8 0*  < > 10 4*   HEMATOCRIT % 24 8* 27 0* 21 6* 25 7*  < > 31 9*   PLATELETS Thousands/uL 177  --  155 235  < > 278   NEUTROS PCT %  --   --   --   --   --  80*   MONOS PCT %  --   --   --   --   --  7   < > = values in this interval not displayed  Results from last 7 days  Lab Units 06/14/18  0457 06/13/18  0434 06/11/18  1734   SODIUM mmol/L 133* 136 134*   POTASSIUM mmol/L 4 4 5 0 4 4   CHLORIDE mmol/L 103 106 102   CO2 mmol/L 23 23 24   BUN mg/dL 8 12 8   CREATININE mg/dL 0 44* 0 58* 0 60   CALCIUM mg/dL 8 0* 7 6* 8 1*   TOTAL PROTEIN g/dL  --   --  5 7*   BILIRUBIN TOTAL mg/dL  --   --  1 01*   ALK PHOS U/L  --   --  71   ALT U/L  --   --  49   AST U/L  --   --  52*   GLUCOSE RANDOM mg/dL 106 103 131       Results from last 7 days  Lab Units 06/13/18  0434   MAGNESIUM mg/dL 2 0     Lab Results   Component Value Date    PHOS 3 5 06/12/2018        Results from last 7 days  Lab Units 06/11/18  0118   INR  0 97   PTT seconds 29       Lab Value Date/Time   TROPONINI <0 02 06/11/2018 0005       Imaging/Other:  XR hip/pelv 1 vw right if performed [[id number]] Collected: 06/11/18 1018   Order Status: Completed Updated: 06/11/18 1021   Narrative:     RIGHT HIP    INDICATION:   traction view  COMPARISON:  Prior study performed earlier in the morning  VIEWS:  XR HIP/PELV 1 VW RIGHT  W PELVIS IF PERFORMED   Images: 2    FINDINGS:    Improved alignment of the intertrochanteric fracture of the right femur   Remaining osseous structures intact   Degenerative changes of the hip  No lytic or blastic osseous lesions  Soft tissues are unremarkable  The visualized lumbar spine is unremarkable  Impression:     Improved alignment of intertrochanteric fracture       XR hip/pelv 2-3 vws right if performed [[id number]] Collected: 06/11/18 0994   Order Status: Completed Updated: 06/11/18 0826   Narrative:     RIGHT HIP    INDICATION:   Fall, right hip pain  COMPARISON:  4/24/2017    VIEWS:  XR HIP/PELV 2-3 VWS RIGHT W PELVIS IF PERFORMED   Images: 3    FINDINGS: Bony demineralization  Right femoral intertrochanteric fracture with coxa varus deformity  Mild bilateral hip osteoarthritis is seen  No lytic or blastic osseous lesions  There are atherosclerotic calcifications  Soft tissues are otherwise unremarkable  The visualized lumbar spine is unremarkable  Impression:       Displaced right femoral intertrochanteric fracture  Complications: Received 1U PRBC 6/13/18 due to Hb 7 0    Procedures Performed: Cephalo-medullary Nailing of R basicervical femur fracture 6/11/18 (Dr Jessica Cantu)    Discharge Medications:  See after visit summary for reconciled discharge medications provided to patient and family  Medication changes made with this admission:  · Continue PTA medications:  · ASA 81 mg QD  · Amlodipine 10 mg QD  · Lisinopril 40 mg QD  · Vit D3 1 g BID  · Vit B12 1000 mcg QD  · Start:   · Tylenol 650 mg PO Q6H PRN for mild pain  · Oxycodone 2 5 mg PO Q6H PRN for moderate pain  · Oxycodone 5 mg PO Q6H PRN for severe pain     Important Physician Related Follow Up:   · PCP: Dr Ml Cnitron  · MIC[facility]ANO  · Dr Jessica Cantu, Orthopedic Surgery  · Follow up appointment as outpatient in 2 weeks   · Dr Sheree Tran, Endocrinology  · Follow up appointment as outpatient in 4-6 weeks  · DEXA scan    Labs pending at discharge:  · SPEP   · UPEP    Condition at Discharge: Good  Disposition: Sk[facility] nursing [facility]   Discharge instructions/Information to patient and family: See after visit summary for information provided to patient and family  Provisions for Follow-Up Care: See after visit summary for information related to follow-up care and any pertinent home health orders      Planned Readmission: No   Discharge Statement: I spent 30 minutes discharging the patient  This time was spent on the day of discharge  I had direct contact with the patient on the day of discharge  Additional documentation is required if more than 30 minutes were spent on discharge  The senior resident, Dr Molina Trivedi, and the attending physician, Dr Jignesh Shields, agreed with the assessment and plan      Makenzie Villatoro MD, PGY-1  [employer] [facility]   6/14/2018

## 2018-06-14 NOTE — PROGRESS NOTES
Orthopedics   Priscella Flurry 80 y o  female MRN: [id number]  [facility]      Subjective:  80 y  o female post operative day 3 right femoral intramedullary nail  Hypotensive overnight, responsive to bolus  Cannot meaningfully answer questions     Labs:    0  Lab Value Date/Time   HCT 24 8 (L) 06/14/2018 0457   HCT 27 0 (L) 06/13/2018 1435   HCT 21 6 (L) 06/13/2018 0434   HCT 40 2 11/02/2015 1327   HCT 40 5 10/08/2014 0852   HCT 40 1 06/04/2014 0904   HGB 8 1 (L) 06/14/2018 0457   HGB 8 5 (L) 06/13/2018 1435   HGB 7 0 (L) 06/13/2018 0434   HGB 13 1 11/02/2015 1327   HGB 12 7 10/08/2014 0852   HGB 12 9 06/04/2014 0904   INR 0 97 06/11/2018 0118   WBC 4 98 06/14/2018 0457   WBC 5 25 06/13/2018 0434   WBC 7 88 06/12/2018 0452   WBC 5 52 11/02/2015 1327   WBC 4 36 10/08/2014 0852   WBC 4 30 (L) 06/04/2014 0904   ESR 23 (H) 06/11/2018 1734       Meds:    Current Facility-Administered Medications:     acetaminophen (TYLENOL) tablet 975 mg, 975 mg, Oral, Q8H [address], Estelle Duron MD, 975 mg at 06/14/18 0615    amLODIPine (NORVASC) tablet 10 mg, 10 mg, Oral, Daily, Estelle Duron MD, 10 mg at 06/13/18 0913    aspirin chewable tablet 81 mg, 81 mg, Oral, Daily, Estelle Duron MD, 81 mg at 06/13/18 0912    cholecalciferol (VITAMIN D3) tablet 1,000 Units, 1,000 Units, Oral, BID, Estelle Duron MD, 1,000 Units at 06/13/18 1735    cyanocobalamin (VITAMIN B-12) tablet 1,000 mcg, 1,000 mcg, Oral, Daily, Estelle Duron MD, 1,000 mcg at 06/13/18 0913    enoxaparin (LOVENOX) subcutaneous injection 40 mg, 40 mg, Subcutaneous, Daily, Isacc Torres PA-C, 40 mg at 06/13/18 0911    HYDROmorphone (DILAUDID) injection 0 2 mg, 0 2 mg, Intravenous, Q4H PRN, Estelle Duron MD, 0 2 mg at 06/13/18 2050    lisinopril (ZESTRIL) tablet 40 mg, 40 mg, Oral, Daily, Estlele Duron MD, 40 mg at 06/13/18 0913    ondansetron (ZOFRAN) injection 4 mg, 4 mg, Intravenous, Q4H PRN, Estelle Duron MD    oxyCODONE (ROXICODONE) IR tablet 2 5 mg, 2 5 mg, Oral, Q4H PRN, Carroll Ludwig MD    oxyCODONE (ROXICODONE) IR tablet 5 mg, 5 mg, Oral, Q4H PRN, Carroll Ludwig MD, 5 mg at 06/13/18 0943    Insert peripheral IV, , , Once **AND** sodium chloride (PF) 0 9 % injection 3 mL, 3 mL, Intravenous, PRN, Olivia Faye,     sodium chloride 0 9 % bolus 500 mL, 500 mL, Intravenous, Once, Viridiana Clark MD, Last Rate: 500 mL/hr at 06/13/18 2218, 500 mL at 06/13/18 2218    sodium chloride 0 9 % infusion, 75 mL/hr, Intravenous, Continuous, Adina Crooks MD, Last Rate: 75 mL/hr at 06/14/18 0621, 75 mL/hr at 06/14/18 2352    Blood Culture:   No results found for: BLOODCX    Wound Culture:   No results found for: WOUNDCULT    Ins and Outs:  I/O last 24 hours: In: 3386 3 [P O :480; I V :2206 3; Blood:700]  Out: 1975 [Urine:1975]          Physical exam:  Vitals:    06/13/18 2333   BP: 133/60   Pulse: 70   Resp: 12   Temp:    SpO2:      right lower extremity  · Dressing clean dry intact  · Unable to comply with motorsensory exam 2/2 mental status   +2 DP  Spontaneously moving toes   _*_*_*_*_*_*_*_*_*_*_*_*_*_*_*_*_*_*_*_*_*_*_*_*_*_*_*_*_*_*_*_*_*_*_*_*_*_*_*_*_*    Assessment: 80 y  o female post operative day 3 right femur IMN, patient comfortable     Plan:  · Up and out of bed  · Weightbearing as tolerated RLE   · PT/OT  · DVT prophylaxis- chemical and mechanical   · Analgesics  · Medical management per primary team   · Dispo: Ortho will follow  · ABLA- s/p 1 unit PRBC, will continue to monitor     Teo Simms MD

## 2018-06-15 ENCOUNTER — TELEPHONE (OUTPATIENT)
Dept: FAMILY MEDICINE CLINIC | Facility: CLINIC | Age: 83
End: 2018-06-15

## 2018-06-15 ENCOUNTER — TELEPHONE (OUTPATIENT)
Dept: OBGYN CLINIC | Facility: HOSPITAL | Age: 83
End: 2018-06-15

## 2018-06-15 PROBLEM — E87.1 HYPONATREMIA: Status: ACTIVE | Noted: 2018-06-15

## 2018-06-15 LAB
ALBUMIN SERPL ELPH-MCNC: 2.85 G/DL (ref 3.5–5)
ALBUMIN SERPL ELPH-MCNC: 55.9 % (ref 52–65)
ALBUMIN UR ELPH-MCNC: 100 %
ALPHA1 GLOB MFR UR ELPH: 0 %
ALPHA1 GLOB SERPL ELPH-MCNC: 0.46 G/DL (ref 0.1–0.4)
ALPHA1 GLOB SERPL ELPH-MCNC: 9 % (ref 2.5–5)
ALPHA2 GLOB MFR UR ELPH: 0 %
ALPHA2 GLOB SERPL ELPH-MCNC: 0.71 G/DL (ref 0.4–1.2)
ALPHA2 GLOB SERPL ELPH-MCNC: 13.9 % (ref 7–13)
B-GLOBULIN MFR UR ELPH: 0 %
BETA GLOB ABNORMAL SERPL ELPH-MCNC: 0.3 G/DL (ref 0.4–0.8)
BETA1 GLOB SERPL ELPH-MCNC: 5.9 % (ref 5–13)
BETA2 GLOB SERPL ELPH-MCNC: 4.5 % (ref 2–8)
BETA2+GAMMA GLOB SERPL ELPH-MCNC: 0.23 G/DL (ref 0.2–0.5)
GAMMA GLOB ABNORMAL SERPL ELPH-MCNC: 0.55 G/DL (ref 0.5–1.6)
GAMMA GLOB MFR UR ELPH: 0 %
GAMMA GLOB SERPL ELPH-MCNC: 10.8 % (ref 12–22)
IGG/ALB SER: 1.27 {RATIO} (ref 1.1–1.8)
PROT PATTERN UR ELPH-IMP: ABNORMAL
PROT SERPL-MCNC: 5.1 G/DL (ref 6.4–8.2)
PROT UR-MCNC: 44 MG/DL

## 2018-06-15 NOTE — TELEPHONE ENCOUNTER
Spoke with Michelle Omer in [place] and told that patient was admitted yesterday under Dr Marybeth Puga  Thanks

## 2018-06-15 NOTE — TELEPHONE ENCOUNTER
RABIA LOJA [facility],  Requesting cirrent patient to be followed while there for a Short Term Rehab  Patient was admitted yesterday

## 2018-06-22 ENCOUNTER — TELEPHONE (OUTPATIENT)
Dept: OBGYN CLINIC | Facility: HOSPITAL | Age: 83
End: 2018-06-22

## 2018-06-22 NOTE — TELEPHONE ENCOUNTER
I spoke with [address] [place] [address] them above information  They will ice and elevate, tylenol for pain  Nurse will call back later to discuss further as she was unable to talk currently  Awaiting call back

## 2018-06-22 NOTE — TELEPHONE ENCOUNTER
Nursing staff at [facility] state that the swelling in the R knee has been progressing since the sx on 6/11  Increased pain and swelling since Monday  She has been having a clear drainage weeping from incision  No fever, or redness noted  The Staff Dr Alicia Florence to know if she can be seen sooner than the 6/28 post op appt

## 2018-06-22 NOTE — TELEPHONE ENCOUNTER
Patient sees [facility] because the patient is supposed to come in for a post op on 06/28  She is having swelling in her knee and they want to know if she should be seen sooner?

## 2018-06-22 NOTE — TELEPHONE ENCOUNTER
[facility]  Nephrology Progress Note    Yudith Dhillon Patient Status:  Inpatient    1949 MRN [id number]   [facility] 6NE-A Attending Loretta Snider MD   Hosp Day # 8 PCP Jim Crabtree MD       SUBJECTIVE:  Awake and alert, e Iris from [facility] was called again and advised to be vigilant with the ice and elevation 4 x daily 20 min on 20min off, and tylenol  Call with any s/s if infection for earlier appt  Verbalized understanding  108   CO2  26.0  26.0   --    --   29.0  25.0   BUN  29*  29*   --    --   23*  16   CREATSERUM  0.96  1.14*   --    --   1.03*  0.87   CA  8.4  8.6   --    --   8.9  9.1   MG  2.0  2.0   --    --   2.0  2.1   PHOS  2.7   --    --    --   3.6  4.3   GLU  9 PEG 3350 (MIRALAX) powder packet 17 g 17 g Oral Daily PRN   magnesium hydroxide (MILK OF MAGNESIA) 400 MG/5ML suspension 30 mL 30 mL Oral Daily PRN   bisacodyl (DULCOLAX) rectal suppository 10 mg 10 mg Rectal Daily PRN   FLEET ENEMA (FLEET) 7-19 GM/118ML

## 2018-06-27 ENCOUNTER — TELEPHONE (OUTPATIENT)
Dept: OBGYN CLINIC | Facility: HOSPITAL | Age: 83
End: 2018-06-27

## 2018-06-27 NOTE — TELEPHONE ENCOUNTER
That would be great, patient is now having diarrhea and it travel is contraindicated  Can you fax orders to [phone] [facility] [phone]  I will cancel tomorrows appt

## 2018-06-27 NOTE — TELEPHONE ENCOUNTER
Daughter calling today to see if there is a way for her mother to have an xray  Ordered and post-op visit postponed due to her mother having UTI and Bowel issues (negative for Cdif)  I advised that the incision needs to be checked for her postop due to drainage and redness to knee area  I called [facility] and asked if we can switch the [address] to a Stretcher due to patient's weakness  They will do this  Daughter informed and is in agreement with this

## 2018-06-27 NOTE — TELEPHONE ENCOUNTER
This is okay however if the patient has difficulty getting the office she may have sutures removed at her place of residence and x-rays taken at her place of residence, please let me know I be happy to put in an order

## 2018-06-27 NOTE — TELEPHONE ENCOUNTER
Radha Andrews called from [facility] to cancel the pt's appt for tomorrow per the pt's daughter  She needs an order faxed to her to remove her sutures   She can be reached at [phone] ext [phone] or faxed to [phone]

## 2018-06-28 DIAGNOSIS — M89.8X5 PAIN IN RIGHT FEMUR: Primary | ICD-10-CM

## 2018-07-23 ENCOUNTER — APPOINTMENT (EMERGENCY)
Dept: RADIOLOGY | Facility: HOSPITAL | Age: 83
End: 2018-07-23
Payer: MEDICARE

## 2018-07-23 ENCOUNTER — HOSPITAL ENCOUNTER (EMERGENCY)
Facility: HOSPITAL | Age: 83
Discharge: HOME | End: 2018-07-23
Attending: EMERGENCY MEDICINE | Admitting: EMERGENCY MEDICINE
Payer: MEDICARE

## 2018-07-23 VITALS
SYSTOLIC BLOOD PRESSURE: 115 MMHG | TEMPERATURE: 97.3 F | RESPIRATION RATE: 16 BRPM | HEART RATE: 85 BPM | OXYGEN SATURATION: 97 % | DIASTOLIC BLOOD PRESSURE: 62 MMHG
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
## 2018-07-23 LAB
ANION GAP SERPL CALCULATED.3IONS-SCNC: 8 MMOL/L (ref 4–13)
BASOPHILS # BLD AUTO: 0.03 THOUSANDS/ΜL (ref 0–0.1)
BASOPHILS NFR BLD AUTO: 0 % (ref 0–1)
BILIRUB UR QL STRIP: NEGATIVE
BUN SERPL-MCNC: 112 MG/DL (ref 5–25)
CALCIUM SERPL-MCNC: 10.1 MG/DL (ref 8.3–10.1)
CHLORIDE SERPL-SCNC: 106 MMOL/L (ref 100–108)
CLARITY UR: CLEAR
CO2 SERPL-SCNC: 23 MMOL/L (ref 21–32)
COLOR UR: YELLOW
COLOR, POC: NORMAL
CREAT SERPL-MCNC: 0.99 MG/DL (ref 0.6–1.3)
EOSINOPHIL # BLD AUTO: 0 THOUSAND/ΜL (ref 0–0.61)
EOSINOPHIL NFR BLD AUTO: 0 % (ref 0–6)
ERYTHROCYTE [DISTWIDTH] IN BLOOD BY AUTOMATED COUNT: 14.8 % (ref 11.6–15.1)
GFR SERPL CREATININE-BSD FRML MDRD: 48 ML/MIN/1.73SQ M
GLUCOSE SERPL-MCNC: 141 MG/DL (ref 65–140)
GLUCOSE UR STRIP-MCNC: NEGATIVE MG/DL
HCT VFR BLD AUTO: 37.4 % (ref 34.8–46.1)
HGB BLD-MCNC: 11.6 G/DL (ref 11.5–15.4)
HGB UR QL STRIP.AUTO: NEGATIVE
IMM GRANULOCYTES # BLD AUTO: 0.1 THOUSAND/UL (ref 0–0.2)
IMM GRANULOCYTES NFR BLD AUTO: 1 % (ref 0–2)
KETONES UR STRIP-MCNC: NEGATIVE MG/DL
LEUKOCYTE ESTERASE UR QL STRIP: NEGATIVE
LYMPHOCYTES # BLD AUTO: 0.7 THOUSANDS/ΜL (ref 0.6–4.47)
LYMPHOCYTES NFR BLD AUTO: 8 % (ref 14–44)
MCH RBC QN AUTO: 30.4 PG (ref 26.8–34.3)
MCHC RBC AUTO-ENTMCNC: 31 G/DL (ref 31.4–37.4)
MCV RBC AUTO: 98 FL (ref 82–98)
MONOCYTES # BLD AUTO: 0.51 THOUSAND/ΜL (ref 0.17–1.22)
MONOCYTES NFR BLD AUTO: 6 % (ref 4–12)
NEUTROPHILS # BLD AUTO: 7.5 THOUSANDS/ΜL (ref 1.85–7.62)
NEUTS SEG NFR BLD AUTO: 85 % (ref 43–75)
NITRITE UR QL STRIP: NEGATIVE
NRBC BLD AUTO-RTO: 0 /100 WBCS
PH UR STRIP.AUTO: 5.5 [PH] (ref 4.5–8)
PLATELET # BLD AUTO: 322 THOUSANDS/UL (ref 149–390)
PMV BLD AUTO: 10.8 FL (ref 8.9–12.7)
POTASSIUM SERPL-SCNC: 4.2 MMOL/L (ref 3.5–5.3)
PROT UR STRIP-MCNC: NEGATIVE MG/DL
RBC # BLD AUTO: 3.82 MILLION/UL (ref 3.81–5.12)
SODIUM SERPL-SCNC: 137 MMOL/L (ref 136–145)
SP GR UR STRIP.AUTO: <=1.005 (ref 1–1.03)
UROBILINOGEN UR QL STRIP.AUTO: 0.2 E.U./DL
WBC # BLD AUTO: 8.84 THOUSAND/UL (ref 4.31–10.16)

## 2018-07-23 PROCEDURE — 73030 X-RAY EXAM OF SHOULDER: CPT

## 2018-07-23 PROCEDURE — 73564 X-RAY EXAM KNEE 4 OR MORE: CPT

## 2018-07-23 PROCEDURE — 70450 CT HEAD/BRAIN W/O DYE: CPT

## 2018-07-23 PROCEDURE — 36415 COLL VENOUS BLD VENIPUNCTURE: CPT | Performed by: EMERGENCY MEDICINE

## 2018-07-23 PROCEDURE — 80048 BASIC METABOLIC PNL TOTAL CA: CPT | Performed by: EMERGENCY MEDICINE

## 2018-07-23 PROCEDURE — 99284 EMERGENCY DEPT VISIT MOD MDM: CPT

## 2018-07-23 PROCEDURE — 81003 URINALYSIS AUTO W/O SCOPE: CPT

## 2018-07-23 PROCEDURE — 85025 COMPLETE CBC W/AUTO DIFF WBC: CPT | Performed by: EMERGENCY MEDICINE

## 2018-07-23 PROCEDURE — 96360 HYDRATION IV INFUSION INIT: CPT

## 2018-07-23 PROCEDURE — 96361 HYDRATE IV INFUSION ADD-ON: CPT

## 2018-07-23 RX ADMIN — SODIUM CHLORIDE 500 ML: 0.9 INJECTION, SOLUTION INTRAVENOUS at 13:00

## 2018-07-23 NOTE — ED NOTES
Patient with feces on sacral area  Patient cleansed and positioned on her side       Radha Lozano RN  07/23/18 5915

## 2018-07-23 NOTE — ED PROVIDER NOTES
History  Chief Complaint   Patient presents with    Fall     patient rolled out of low bed at nursing home onto a pad  Patient found on her left side  No complaints of pain, but left shoulder appears reddened and sore  79-year-old female brought from nursing facility by EMS for fall  Patient was attempting to get out of her low bed this morning and rolled onto a rope per format next to her bed  Her roommate noticed the alarm and at approximately 9:50 a m  sought help  Patient was on the floor for approximately 20 min until EMS arrived  The patient had her left arm under and behind her and there was concern for dislocation, at that time she exhibited no altered mental status  On exam patient is mildly confused but answers appropriately  She complains of no pain anywhere, denies headaches, changes in vision, or any focal neurological deficit  Patient had no recollection of the event this morning and believes to be at the hospital for a checkup  Patient currently on Lovenox  Prior to Admission Medications   Prescriptions Last Dose Informant Patient Reported? Taking?   acetaminophen (TYLENOL) 325 mg tablet   No No   Sig: Take 2 tablets (650 mg total) by mouth every 6 (six) hours as needed for mild pain   amLODIPine (NORVASC) 10 mg tablet   No No   Sig: Take 1 tablet by mouth daily   aspirin 81 MG tablet   Yes No   Sig: Take 81 mg by mouth 2 (two) times a week  cholecalciferol (VITAMIN D3) 1,000 units tablet   Yes No   Sig: Take 1,000 Units by mouth 2 (two) times a day     cyanocobalamin (VITAMIN B-12) 1,000 mcg tablet  Outside Facility (Specify) Yes No   Sig: Take 1,000 mcg by mouth daily   enoxaparin (LOVENOX) 40 mg/0 4 mL   No No   Sig: Inject 0 4 mL (40 mg total) under the skin daily for 30 days   ergocalciferol (VITAMIN D2) 50,000 units   Yes No   Sig: Take 50,000 Units by mouth every 28 days     lisinopril (ZESTRIL) 40 mg tablet   No No   Sig: Take 1 tablet by mouth daily metoprolol tartrate (LOPRESSOR) 50 mg tablet   Yes No   Sig: Take 50 mg by mouth 2 (two) times a day  oxyCODONE (ROXICODONE) 5 mg immediate release tablet   No No   Sig: Take 1 tablet (5 mg total) by mouth every 6 (six) hours as needed for moderate pain Max Daily Amount: 20 mg      Facility-Administered Medications: None       Past Medical History:   Diagnosis Date    History of shingles 2013    Hypertension     Osteoarthritis        Past Surgical History:   Procedure Laterality Date    APPENDECTOMY      CHOLECYSTECTOMY      HYSTERECTOMY      NC OPEN RX FEMUR FX+INTRAMED JOSE Right 6/11/2018    Procedure: INSERTION NAIL IM FEMUR ANTEGRADE (TROCHANTERIC); Surgeon: Tariq Noel MD;  Location: [facility];  Service: Orthopedics    TONSILECTOMY AND ADNOIDECTOMY      VARICOSE VEIN SURGERY         No family history on file  I have reviewed and agree with the history as documented  Social History   Substance Use Topics    Smoking status: Never Smoker    Smokeless tobacco: Never Used    Alcohol use No        Review of Systems   Reason unable to perform ROS: Patient is overall a poor historian  Constitutional: Negative for chills, diaphoresis, fatigue and fever  Eyes: Negative for photophobia and visual disturbance  Respiratory: Negative for cough and shortness of breath  Cardiovascular: Negative for chest pain and palpitations  Gastrointestinal: Negative for abdominal distention, abdominal pain, constipation, diarrhea, nausea and vomiting  Genitourinary: Negative for dysuria, frequency and hematuria  Musculoskeletal: Negative for arthralgias, myalgias and neck pain  Neurological: Negative for dizziness, syncope, facial asymmetry, speech difficulty, weakness, light-headedness, numbness and headaches  All other systems reviewed and are negative        Physical Exam  ED Triage Vitals   Temperature Pulse Respirations Blood Pressure SpO2   07/23/18 1126 07/23/18 1047 07/23/18 1047 07/23/18 1047 07/23/18 1048   (!) 97 3 °F (36 3 °C) 85 16 95/54 92 %      Temp Source Heart Rate Source Patient Position - Orthostatic VS BP Location FiO2 (%)   07/23/18 1126 07/23/18 1315 07/23/18 1047 07/23/18 1047 --   Rectal Monitor Lying Right arm       Pain Score       07/23/18 1047       No Pain           Orthostatic Vital Signs  Vitals:    07/23/18 1047 07/23/18 1315   BP: 95/54 115/62   Pulse: 85 85   Patient Position - Orthostatic VS: Lying Sitting       Physical Exam   Constitutional: She appears well-nourished  No distress  HENT:   Head: Normocephalic and atraumatic  Right Ear: External ear normal    Left Ear: External ear normal    Eyes: Conjunctivae and EOM are normal  Pupils are equal, round, and reactive to light  No scleral icterus  Hesitates to open left eye but is able to do so  When asked why she complains about general itching or irritation  Neck: Normal range of motion  Neck supple  No JVD present  Cardiovascular: Normal rate, regular rhythm and intact distal pulses  Exam reveals no gallop and no friction rub  Murmur heard  Pulmonary/Chest: Effort normal and breath sounds normal  No respiratory distress  She has no wheezes  She has no rales  Abdominal: Soft  Bowel sounds are normal  She exhibits no distension and no mass  There is no tenderness  There is no guarding  Musculoskeletal: Normal range of motion  She exhibits no tenderness or deformity  Arms:       Legs:    Patient has symmetrical strength in upper and lower extremities which is uniformly diminished  Lymphadenopathy:     She has no cervical adenopathy  Neurological: She is alert  No cranial nerve deficit or sensory deficit  She exhibits normal muscle tone  Coordination normal    Skin: Skin is warm and dry  She is not diaphoretic  Psychiatric: She has a normal mood and affect  Judgment and thought content normal    Patient appears to doze off while talking to her but is easily arousable     Vitals reviewed  ED Medications  Medications   sodium chloride 0 9 % bolus 500 mL (0 mL Intravenous Stopped 7/23/18 1500)       Diagnostic Studies  Results Reviewed     Procedure Component Value Units Date/Time    POCT urinalysis dipstick [[id number]]  (Normal) Resulted:  07/23/18 1451    Lab Status:  Final result Specimen:  Urine Updated:  07/23/18 1505     Color, UA clear yellow    ED Urine Macroscopic [[id number]] Collected:  07/23/18 1502    Lab Status:  Final result Specimen:  Urine Updated:  07/23/18 1451     Color, UA Yellow     Clarity, UA Clear     pH, UA 5 5     Leukocytes, UA Negative     Nitrite, UA Negative     Protein, UA Negative mg/dl      Glucose, UA Negative mg/dl      Ketones, UA Negative mg/dl      Urobilinogen, UA 0 2 E U /dl      Bilirubin, UA Negative     Blood, UA Negative     Specific Gravity, UA <=1 005    Narrative:       CLINITEK RESULT    Basic metabolic panel [[id number]]  (Abnormal) Collected:  07/23/18 1257    Lab Status:  Final result Specimen:  Blood from Arm, Left Updated:  07/23/18 1338     Sodium 137 mmol/L      Potassium 4 2 mmol/L      Chloride 106 mmol/L      CO2 23 mmol/L      Anion Gap 8 mmol/L       (H) mg/dL      Creatinine 0 99 mg/dL      Glucose 141 (H) mg/dL      Calcium 10 1 mg/dL      eGFR 48 ml/min/1 73sq m     Narrative:         National Kidney Disease Education Program recommendations are as follows:  GFR calculation is accurate only with a steady state creatinine  Chronic Kidney disease less than 60 ml/min/1 73 sq  meters  Kidney failure less than 15 ml/min/1 73 sq  meters      CBC and differential [[id number]]  (Abnormal) Collected:  07/23/18 1257    Lab Status:  Final result Specimen:  Blood from Arm, Left Updated:  07/23/18 1312     WBC 8 84 Thousand/uL      RBC 3 82 Million/uL      Hemoglobin 11 6 g/dL      Hematocrit 37 4 %      MCV 98 fL      MCH 30 4 pg      MCHC 31 0 (L) g/dL      RDW 14 8 %      MPV 10 8 fL      Platelets 153 Thousands/uL      nRBC 0 /100 WBCs      Neutrophils Relative 85 (H) %      Immat GRANS % 1 %      Lymphocytes Relative 8 (L) %      Monocytes Relative 6 %      Eosinophils Relative 0 %      Basophils Relative 0 %      Neutrophils Absolute 7 50 Thousands/µL      Immature Grans Absolute 0 10 Thousand/uL      Lymphocytes Absolute 0 70 Thousands/µL      Monocytes Absolute 0 51 Thousand/µL      Eosinophils Absolute 0 00 Thousand/µL      Basophils Absolute 0 03 Thousands/µL                  XR knee 4+ views Right injury   Final Result by Tito Page MD (07/23 1456)      Severe tricompartmental osteoarthritis  No acute fracture identified  Interval placement of fixation kirsten in the femoral shaft            Workstation performed: [id number]         CT head without contrast   Final Result by Wesley Lomas DO (07/23 1314)   Exam limited by motion artifact  No large hemorrhage or obvious territorial infarct  Workstation performed: [id number]         XR shoulder 2+ views LEFT   Final Result by Claire Gillespie DO (07/23 1245)   Degenerative changes of the shoulder and acromioclavicular joint  No acute osseous abnormality  Workstation performed: [id number]               Procedures  Procedures      Phone Consults  ED Phone Contact    ED Course           Identification of Seniors at Risk      Most Recent Value   (ISAR) Identification of Seniors at Risk   Before the illness or injury that brought you to the Emergency, did you need someone to help you on a regular basis? 1 Filed at: 07/23/2018 1048   In the last 24 hours, have you needed more help than usual?  1 Filed at: 07/23/2018 1048   Have you been hospitalized for one or more nights during the past 6 months? 0 Filed at: 07/23/2018 1048   In general, do you see well?  --   In general, do you have serious problems with your memory? 1 Filed at: 07/23/2018 1048   Do you take more than three different medications every day?   1 Filed at: 07/23/2018 1048   ISAR Score 4 Filed at: 07/23/2018 1048                          [facility]  Number of Diagnoses or Management Options  Fall:   Diagnosis management comments:   19-year-old female presents to the emergency department after a fall  The daughter, June Reasons, was contacted and treatment options were discussed  Was decided to pursue a workup including a head CT, shoulder x-ray, CBC, BMP, and UA  Patient was also given 500 cc bolus of normal saline due to low blood pressure  Pressure is improved after 500 cc bolus  CBC was unremarkable  Head CT and shoulder x-ray were negative  Upon insertion of straight catheter for urine sample nurse noted excoriations   as well as apparent tenderness in the patient's right knee when it was being moved  X-ray of right knee will be performed  X-ray of right knee showed no acute processes  Urinalysis was unremarkable  Patient will be discharged back to nursing home  CritCare Time    Disposition  Final diagnoses:   Fall     Time reflects when diagnosis was documented in both MDM as applicable and the Disposition within this note     Time User Action Codes Description Comment    7/23/2018  3:03 PM Forrestine Flash Add [K71  [id number]] Fall       ED Disposition     ED Disposition Condition Comment    Discharge  Nolan Valdivia discharge to home/self care  Condition at discharge: Stable        Follow-up Information     Follow up With Specialties Details Why Contact Info Additional [address] Emergency Department Emergency Medicine  If symptoms worsen [address]  [phone]  ED, [address] [place], [place], [address]          Patient's Medications   Discharge Prescriptions    No medications on file     No discharge procedures on file  ED Provider  Attending physically available and evaluated Nolan Valdivia I managed the patient along with the ED Attending      Electronically Signed by         Jo Ann Encinas, MD  07/23/18 2375

## 2018-07-23 NOTE — DISCHARGE INSTRUCTIONS
Musculoskeletal Pain   Andie ROPER & Parth CHANG: Tendinitis  In: Evelio LUCAS, ed  The 5-Minute Clinical Consult 2012, 20th ed  [address] [facility],[address] [place] [address], [facility] [address], 2012  Reno LYONS: Musculoskeletal System Injuries  In: [facility]'s Management of Pain, 3rd ed  [address] [facility],[address] [place] [address], [facility] [address], 2001  [address] [facility]: Muscles, tendons, and ligaments  In: Valeria Copeland PD, eds  Handbook of Pain Management: A Clinical  to Narcisa and Bala's Textbook of Pain, 1st ed  [address], [place], [facility] [address], 2003  Thelma Speaks: Sprains and Strains  In: Evelio LUCAS, ed  The 5-Minute Clinical Consult 2012, 20th ed  [address] [facility],[address] [place] [address], [facility] [address], 2012  © 2017 [facility] [employer] Information is for End User's use only and may not be sold, redistributed or otherwise used for commercial purposes  All illustrations and images included in CareNotes® are the copyrighted property of A D A M , Inc  or Mateo Sesay  The above information is an  only  It is not intended as medical advice for individual conditions or treatments  Talk to your doctor, nurse or pharmacist before following any medical regimen to see if it is safe and effective for you

## 2018-07-23 NOTE — ED ATTENDING ATTESTATION
Ayde Maldonado MD, saw and evaluated the patient  I have discussed the patient with the resident/non-physician practitioner and agree with the resident's/non-physician practitioner's findings, Plan of Care, and MDM as documented in the resident's/non-physician practitioner's note, except where noted  All available labs and Radiology studies were reviewed  At this point I agree with the current assessment done in the Emergency Department  I have conducted an independent evaluation of this patient a history and physical is as follows:   The patient is a nursing home resident with dementia the patient apparently rolled out of her bed the patient has a mat next to the bed so she was found on the mat the only concern was that her left shoulder was contused in a braced   the patient has a low height bed  The patient is on Eliquis  No syncope  no recent illness fevers no vomiting  Exam:  Frail elderly female  no external evidence of head trauma she is able to follow simple command however she has not alert and oriented x3 she does know her name  Neck is nontender    lungs clear heart regular abdomen soft nontender  Extremities there is areas contusions of various ages on her lower extremities she has a small sacral decubiti day that is not cellulitic  Patient has a left shoulder  abrasion it is not dislocated clinically  Spoke with the patient's power-of- she would like lab work to be done a CT scan of the head    Critical Care Time  CritCare Time    Procedures

## 2018-07-23 NOTE — ED NOTES
[facility] not available at all today   [facility] will transport bls at [address], [place]  07/23/18 6833

## 2018-07-23 NOTE — ED NOTES
Patient transported to radiology      Alin Macdonald RN  07/23/18 22976 Pepe Dawkins RN  07/23/18 6707

## 2018-07-23 NOTE — ED NOTES
[facility] called back will do transport before 1630   Daughter called back and updated with new tx time     Sera Beatty RN  07/23/18 6151

## 2025-01-21 NOTE — ASSESSMENT & PLAN NOTE
Patient currently at baseline  I called and spoke to the daughter who said that patient sometime dose is alert and sometimes is not  As per her this might be her baseline  She lives at home with her daughter  PT recommends rehab  Home

## (undated) DEVICE — SPONGE PVP SCRUB WING STERILE

## (undated) DEVICE — PAD CAST 4 IN COTTON NON STERILE

## (undated) DEVICE — INTENDED FOR TISSUE SEPARATION, AND OTHER PROCEDURES THAT REQUIRE A SHARP SURGICAL BLADE TO PUNCTURE OR CUT.: Brand: BARD-PARKER SAFETY BLADES SIZE 10, STERILE

## (undated) DEVICE — CURITY NON-ADHERENT STRIPS: Brand: CURITY

## (undated) DEVICE — INTENDED FOR TISSUE SEPARATION, AND OTHER PROCEDURES THAT REQUIRE A SHARP SURGICAL BLADE TO PUNCTURE OR CUT.: Brand: BARD-PARKER SAFETY BLADES SIZE 15, STERILE

## (undated) DEVICE — SUT VICRYL PLUS 1 CTB-1 36 IN VCPB947H

## (undated) DEVICE — BUTTON SWITCH PENCIL HOLSTER: Brand: VALLEYLAB

## (undated) DEVICE — 2.5MM REAMING ROD WITH BALL TIP/950MM-STERILE

## (undated) DEVICE — ABDOMINAL PAD: Brand: DERMACEA

## (undated) DEVICE — 3M™ TEGADERM™ TRANSPARENT FILM DRESSING FRAME STYLE, 1628, 6 IN X 8 IN (15 CM X 20 CM), 10/CT 8CT/CASE: Brand: 3M™ TEGADERM™

## (undated) DEVICE — ARTHROSCOPY FLOOR MAT

## (undated) DEVICE — REM POLYHESIVE ADULT PATIENT RETURN ELECTRODE: Brand: VALLEYLAB

## (undated) DEVICE — 3.2MM GUIDE WIRE 400MM

## (undated) DEVICE — BETADINE SURGICAL SCRUB 32OZ

## (undated) DEVICE — CHLORAPREP HI-LITE 26ML ORANGE

## (undated) DEVICE — ALL PURPOSE SPONGES,NONWOVEN, 4 PLY: Brand: CURITY

## (undated) DEVICE — HEAVY DUTY TABLE COVER: Brand: CONVERTORS

## (undated) DEVICE — DRAPE C-ARMOUR

## (undated) DEVICE — SUT VICRYL PLUS 2-0 CTB-1 27 IN VCPB259H

## (undated) DEVICE — 6617 IOBAN II PATIENT ISOLATION DRAPE 5/BX,4BX/CS: Brand: STERI-DRAPE™ IOBAN™ 2

## (undated) DEVICE — GLOVE SRG BIOGEL 8

## (undated) DEVICE — SILVER-COATED ANTIMICROBIAL BARRIER DRESSING: Brand: ACTICOAT   4" X 8"

## (undated) DEVICE — GLOVE INDICATOR PI UNDERGLOVE SZ 8.5 BLUE

## (undated) DEVICE — DRAPE SURGIKIT SADDLE BAG

## (undated) DEVICE — GAUZE SPONGES,16 PLY: Brand: CURITY

## (undated) DEVICE — MEDI-VAC YANKAUER SUCTION HANDLE W/STRAIGHT TIP & CONTROL VENT: Brand: CARDINAL HEALTH

## (undated) DEVICE — 3M™ TEGADERM™ TRANSPARENT FILM DRESSING FRAME STYLE, 1626W, 4 IN X 4-3/4 IN (10 CM X 12 CM), 50/CT 4CT/CASE: Brand: 3M™ TEGADERM™

## (undated) DEVICE — STERILE ORIF HIP PACK: Brand: CARDINAL HEALTH